# Patient Record
Sex: FEMALE
[De-identification: names, ages, dates, MRNs, and addresses within clinical notes are randomized per-mention and may not be internally consistent; named-entity substitution may affect disease eponyms.]

---

## 2020-04-25 ENCOUNTER — TRANSCRIPTION ENCOUNTER (OUTPATIENT)
Age: 20
End: 2020-04-25

## 2020-04-27 ENCOUNTER — EMERGENCY (EMERGENCY)
Facility: HOSPITAL | Age: 20
LOS: 1 days | Discharge: HOME | End: 2020-04-27
Attending: EMERGENCY MEDICINE
Payer: COMMERCIAL

## 2020-04-27 ENCOUNTER — APPOINTMENT (OUTPATIENT)
Dept: CARDIOLOGY | Facility: CLINIC | Age: 20
End: 2020-04-27

## 2020-04-27 VITALS
RESPIRATION RATE: 18 BRPM | HEART RATE: 98 BPM | SYSTOLIC BLOOD PRESSURE: 138 MMHG | DIASTOLIC BLOOD PRESSURE: 73 MMHG | TEMPERATURE: 99 F | OXYGEN SATURATION: 100 %

## 2020-04-27 VITALS
DIASTOLIC BLOOD PRESSURE: 86 MMHG | WEIGHT: 210.1 LBS | HEIGHT: 62 IN | SYSTOLIC BLOOD PRESSURE: 137 MMHG | TEMPERATURE: 99 F | RESPIRATION RATE: 19 BRPM | HEART RATE: 115 BPM | OXYGEN SATURATION: 97 %

## 2020-04-27 LAB
ALBUMIN SERPL ELPH-MCNC: 3.3 G/DL — SIGNIFICANT CHANGE UP (ref 3.3–5)
ALP SERPL-CCNC: 65 U/L — SIGNIFICANT CHANGE UP (ref 40–120)
ALT FLD-CCNC: 23 U/L — SIGNIFICANT CHANGE UP (ref 10–45)
ANION GAP SERPL CALC-SCNC: 11 MMOL/L — SIGNIFICANT CHANGE UP (ref 5–17)
AST SERPL-CCNC: 31 U/L — SIGNIFICANT CHANGE UP (ref 10–40)
BASOPHILS # BLD AUTO: 0.02 K/UL — SIGNIFICANT CHANGE UP (ref 0–0.2)
BASOPHILS NFR BLD AUTO: 0.3 % — SIGNIFICANT CHANGE UP (ref 0–2)
BILIRUB SERPL-MCNC: 0.4 MG/DL — SIGNIFICANT CHANGE UP (ref 0.2–1.2)
BLD GP AB SCN SERPL QL: NEGATIVE — SIGNIFICANT CHANGE UP
BUN SERPL-MCNC: 10 MG/DL — SIGNIFICANT CHANGE UP (ref 7–23)
CALCIUM SERPL-MCNC: 9.1 MG/DL — SIGNIFICANT CHANGE UP (ref 8.4–10.5)
CHLORIDE SERPL-SCNC: 100 MMOL/L — SIGNIFICANT CHANGE UP (ref 96–108)
CO2 SERPL-SCNC: 23 MMOL/L — SIGNIFICANT CHANGE UP (ref 22–31)
CREAT SERPL-MCNC: 0.66 MG/DL — SIGNIFICANT CHANGE UP (ref 0.5–1.3)
EOSINOPHIL # BLD AUTO: 0.02 K/UL — SIGNIFICANT CHANGE UP (ref 0–0.5)
EOSINOPHIL NFR BLD AUTO: 0.3 % — SIGNIFICANT CHANGE UP (ref 0–6)
GLUCOSE SERPL-MCNC: 120 MG/DL — HIGH (ref 70–99)
HCT VFR BLD CALC: 32.5 % — LOW (ref 34.5–45)
HGB BLD-MCNC: 10.6 G/DL — LOW (ref 11.5–15.5)
IMM GRANULOCYTES NFR BLD AUTO: 0.3 % — SIGNIFICANT CHANGE UP (ref 0–1.5)
LYMPHOCYTES # BLD AUTO: 2.55 K/UL — SIGNIFICANT CHANGE UP (ref 1–3.3)
LYMPHOCYTES # BLD AUTO: 37 % — SIGNIFICANT CHANGE UP (ref 13–44)
MCHC RBC-ENTMCNC: 27 PG — SIGNIFICANT CHANGE UP (ref 27–34)
MCHC RBC-ENTMCNC: 32.6 GM/DL — SIGNIFICANT CHANGE UP (ref 32–36)
MCV RBC AUTO: 82.7 FL — SIGNIFICANT CHANGE UP (ref 80–100)
MONOCYTES # BLD AUTO: 0.66 K/UL — SIGNIFICANT CHANGE UP (ref 0–0.9)
MONOCYTES NFR BLD AUTO: 9.6 % — SIGNIFICANT CHANGE UP (ref 2–14)
NEUTROPHILS # BLD AUTO: 3.62 K/UL — SIGNIFICANT CHANGE UP (ref 1.8–7.4)
NEUTROPHILS NFR BLD AUTO: 52.5 % — SIGNIFICANT CHANGE UP (ref 43–77)
NRBC # BLD: 0 /100 WBCS — SIGNIFICANT CHANGE UP (ref 0–0)
PLATELET # BLD AUTO: 181 K/UL — SIGNIFICANT CHANGE UP (ref 150–400)
POTASSIUM SERPL-MCNC: 3.6 MMOL/L — SIGNIFICANT CHANGE UP (ref 3.5–5.3)
POTASSIUM SERPL-SCNC: 3.6 MMOL/L — SIGNIFICANT CHANGE UP (ref 3.5–5.3)
PROT SERPL-MCNC: 11.4 G/DL — HIGH (ref 6–8.3)
RBC # BLD: 3.93 M/UL — SIGNIFICANT CHANGE UP (ref 3.8–5.2)
RBC # FLD: 13.2 % — SIGNIFICANT CHANGE UP (ref 10.3–14.5)
RH IG SCN BLD-IMP: POSITIVE — SIGNIFICANT CHANGE UP
RH IG SCN BLD-IMP: POSITIVE — SIGNIFICANT CHANGE UP
SARS-COV-2 RNA SPEC QL NAA+PROBE: SIGNIFICANT CHANGE UP
SODIUM SERPL-SCNC: 140 MMOL/L — SIGNIFICANT CHANGE UP (ref 135–145)
WBC # BLD: 6.89 K/UL — SIGNIFICANT CHANGE UP (ref 3.8–10.5)
WBC # FLD AUTO: 6.89 K/UL — SIGNIFICANT CHANGE UP (ref 3.8–10.5)

## 2020-04-27 PROCEDURE — 99284 EMERGENCY DEPT VISIT MOD MDM: CPT

## 2020-04-27 PROCEDURE — 85027 COMPLETE CBC AUTOMATED: CPT

## 2020-04-27 PROCEDURE — 80053 COMPREHEN METABOLIC PANEL: CPT

## 2020-04-27 PROCEDURE — 99283 EMERGENCY DEPT VISIT LOW MDM: CPT

## 2020-04-27 PROCEDURE — 86901 BLOOD TYPING SEROLOGIC RH(D): CPT

## 2020-04-27 PROCEDURE — 86850 RBC ANTIBODY SCREEN: CPT

## 2020-04-27 PROCEDURE — 86900 BLOOD TYPING SEROLOGIC ABO: CPT

## 2020-04-27 NOTE — ED ADULT TRIAGE NOTE - CHIEF COMPLAINT QUOTE
low platelets with bleeding gums, headache - pt was in [facility]- discharged yesterday  tested neg for covid at [facility]

## 2020-04-27 NOTE — ED PROVIDER NOTE - CARE PROVIDER_API CALL
Azam Pollack)  Cardiology; Internal Medicine  [address], [address]  [place] [address]  Phone: [phone]  Fax: [phone]  Follow Up Time: Routine

## 2020-04-27 NOTE — ED CLERICAL - NS ED CLERK NOTE PRE-ARRIVAL INFORMATION; ADDITIONAL PRE-ARRIVAL INFORMATION
CC/Reason For referral: Needs a ITP   Preferred Consultant(if applicable):  Who admits for you (if needed):  Do you have documents you would like to fax over?  Would you still like to speak to an ED attending? Yes

## 2020-04-27 NOTE — ED PROVIDER NOTE - PATIENT PORTAL LINK FT
You can access the FollowMyHealth Patient Portal offered by [facility] by registering at the following website: [email or web address]. By joining [facility]’s FollowMyHealth portal, you will also be able to view your health information using other applications (apps) compatible with our system.

## 2020-04-27 NOTE — ED PROVIDER NOTE - NSFOLLOWUPINSTRUCTIONS_ED_ALL_ED_FT
Please return to the ER if you begin experiencing abnormal bleeding. Call Dr. Pollack's office this week to schedule an appointment. Your COVID-19 results will be texted to the cell number you provided.     Rest, drink plenty of fluids.  Advance activity as tolerated.  Continue all previously prescribed medications as directed.  Follow up with your primary care physician in 48-72 hours- bring copies of your results.  Return to the ER for worsening or persistent symptoms, and/or ANY NEW OR CONCERNING SYMPTOMS. If you have issues obtaining follow up, please call: [phone] to obtain a doctor or specialist who takes your insurance in your area.

## 2020-04-27 NOTE — ED PROVIDER NOTE - NS ED ROS FT
GENERAL: no fever, no chills  EYES: no change in vision  HEENT: no trouble swallowing or speaking, +bleeding gums  CARDIAC: no chest pain, no palpitations   PULMONARY: no cough, no shortness of breath, no wheezing  GI: no abdominal pain, no nausea, no vomiting, no diarrhea, no constipation, no melena  : no changes in urination  SKIN: no rashes  NEURO: no headache, no numbness, no weakness  MSK: no joint pain, no muscle pain, no back pain, no calf pain     -Vargas Kirkland, PGY-1

## 2020-04-27 NOTE — ED PROVIDER NOTE - ATTENDING CONTRIBUTION TO CARE
Attending Statement (JONO Horne MD):    HPI: 21y/o F with recent admission to OSH for pneumonia and ITP; discharged yesterday, had previously presented for bleeding gums; now s/p platelet transfusion; presenting now for bleeding from gums last night/this morning (only a small amount; much less than previously).  called a doctor (Dr Pollack) to establish care and was directed to come to ED here for repeat evaluation.  No fever, no n/v, no cp/sob.     Review of Systems:  -General: no fever or chills  -ENT: no congestion, no difficulty swallowing; bleeding gums.  -Pulmonary: no cough, no shortness of breath  -Cardiac: no chest pain, no palpitations  -Gastrointestinal: no abdominal pain, no nausea, no vomiting, and no diarrhea.  -Genitourinary: no blood or pain with urination  -Musculoskeletal: no back or neck pain  -Skin: no rashes  -Endocrine: No h/o diabetes or thyroid disease  -Neurologic: No focal weakness or numbness    All else negative unless otherwise specified elsewhere in this note.    PSH/PMH as noted above    On Physical Exam:  General: well appearing, in NAD, speaking clearly in full sentences and without difficulty; cooperative with exam  HEENT: PERRL, MMM, airway patent; gums appear normal, no active bleedign, no blood / abrasions / lacerations in oropharynx.  Neck: no neck tenderness, no nuchal rigidity  Cardiac: normal s1, s2; RRR; no MGR  Lungs: CTABL  Abdomen: soft nontender/nondistended  : no bladder tenderness or distension  Skin: intact, no rash  Extremities: no peripheral edema, no gross deformities  Neuro: no gross neurologic deficits    MDM: 21y/o F with recent admission to OSH for pneumonia and ITP; discharged yesterday, had previously presented for bleeding gums; now s/p platelet transfusion; presenting now for bleeding from gums.  will repeat labs: cbc, cmp and reassess; no active bleeding at present.

## 2020-04-27 NOTE — ED PROVIDER NOTE - PROGRESS NOTE DETAILS
DH: Plt today 181. D/w Dr. Pollack - will see patient in office. COVID-19 sent. Discussed return precautions and all questions answered. Pt in agreement w/ plan. CAOx3, NAD, VSS. Stable for d/c.

## 2020-04-27 NOTE — ED PROVIDER NOTE - OBJECTIVE STATEMENT
20 year old female no PMH p/w bleeding gums. Patient recently admitted to OSH 4/24-4/26 and received platelet transfusion for ITP. Patient was discharged home and told to Follow up w/ Dr. Azam Pollack. Patient returning to ED for additional episode of bleeding gums today. No recent viral illness, no new medications. Patient w/o known hx of bleeding disorders. Patient denies bleeding from other sites, LMP few weeks ago and was normal. Endorses occasional headaches over past few days relieved w/ Tylenol. Denies fever, chills, visual changes, CP, sob, abd pain, N/V, or weakness.

## 2020-04-27 NOTE — ED ADULT NURSE NOTE - OBJECTIVE STATEMENT
Patient was seen at [facility] over the weekend for bleeding of the gums.  Patient was transfused with platelets and given a diagnosis of ITP.  Patient presents today complaining of bleeding of her gums this morning, it has since resolved.  Patient is talking in full sentences and oriented x4. Brusing is noted to her back and arms .  No active bleeding is noted

## 2020-04-27 NOTE — ED PROVIDER NOTE - CLINICAL SUMMARY MEDICAL DECISION MAKING FREE TEXT BOX
Vargas Kirkland, PGY1: 20 year old female no PMH p/w bleeding gums x1 episode today. Recent admission to OSH for same, given platelets after dx ITP. Patient has not followed up w/ Dr. Pollack yet. Tachycardic in triage but will revital. Patient appears stable otherwise, non-focal exam. No active oral hemorrhages or petechiae. Plan for labs, T&S, d/w hematology for follow up.

## 2020-04-27 NOTE — ED PROVIDER NOTE - PHYSICAL EXAMINATION
GENERAL: A&Ox3, non-toxic appearing, no acute distress  HEENT: NCAT, EOMI, oral mucosa moist, normal conjunctiva, no active oral hemorrhages  RESP: CTAB, no respiratory distress, no wheezes/rhonchi/rales, speaking in full sentences  CV: RRR, no murmurs/rubs/gallops  ABDOMEN: soft, non-tender, non-distended, no guarding  MSK: no visible deformities  NEURO: no focal sensory or motor deficits, normal CN exam   SKIN: warm, normal color, well perfused, no rash, no petechiae  PSYCH: normal affect    -Vargas Kirkland, PGY-1

## 2020-05-04 ENCOUNTER — NON-APPOINTMENT (OUTPATIENT)
Age: 20
End: 2020-05-04

## 2020-05-04 ENCOUNTER — APPOINTMENT (OUTPATIENT)
Dept: CARDIOLOGY | Facility: CLINIC | Age: 20
End: 2020-05-04
Payer: COMMERCIAL

## 2020-05-04 ENCOUNTER — APPOINTMENT (OUTPATIENT)
Dept: PULMONOLOGY | Facility: CLINIC | Age: 20
End: 2020-05-04
Payer: COMMERCIAL

## 2020-05-04 VITALS
BODY MASS INDEX: 40.59 KG/M2 | OXYGEN SATURATION: 98 % | SYSTOLIC BLOOD PRESSURE: 130 MMHG | TEMPERATURE: 98.4 F | DIASTOLIC BLOOD PRESSURE: 80 MMHG | HEIGHT: 61 IN | WEIGHT: 215 LBS | HEART RATE: 80 BPM

## 2020-05-04 DIAGNOSIS — Z11.4 ENCOUNTER FOR SCREENING FOR HUMAN IMMUNODEFICIENCY VIRUS [HIV]: ICD-10-CM

## 2020-05-04 DIAGNOSIS — K06.8 OTHER SPECIFIED DISORDERS OF GINGIVA AND EDENTULOUS ALVEOLAR RIDGE: ICD-10-CM

## 2020-05-04 DIAGNOSIS — Z00.00 ENCOUNTER FOR GENERAL ADULT MEDICAL EXAMINATION W/OUT ABNORMAL FINDINGS: ICD-10-CM

## 2020-05-04 DIAGNOSIS — R77.9 ABNORMALITY OF PLASMA PROTEIN, UNSPECIFIED: ICD-10-CM

## 2020-05-04 PROCEDURE — 71046 X-RAY EXAM CHEST 2 VIEWS: CPT

## 2020-05-04 PROCEDURE — 99385 PREV VISIT NEW AGE 18-39: CPT

## 2020-05-04 PROCEDURE — 93000 ELECTROCARDIOGRAM COMPLETE: CPT

## 2020-05-04 NOTE — PHYSICAL EXAM
[General Appearance - Well Developed] : well developed [Well Groomed] : well groomed [General Appearance - Well Nourished] : well nourished [Normal Appearance] : normal appearance [No Deformities] : no deformities [General Appearance - In No Acute Distress] : no acute distress [Normal Conjunctiva] : the conjunctiva exhibited no abnormalities [Normal Oral Mucosa] : normal oral mucosa [Eyelids - No Xanthelasma] : the eyelids demonstrated no xanthelasmas [Normal Jugular Venous A Waves Present] : normal jugular venous A waves present [No Oral Cyanosis] : no oral cyanosis [No Oral Pallor] : no oral pallor [Normal Jugular Venous V Waves Present] : normal jugular venous V waves present [No Jugular Venous Mullins A Waves] : no jugular venous mullins A waves [Heart Rate And Rhythm] : heart rate and rhythm were normal [Heart Sounds] : normal S1 and S2 [Murmurs] : no murmurs present [Respiration, Rhythm And Depth] : normal respiratory rhythm and effort [Exaggerated Use Of Accessory Muscles For Inspiration] : no accessory muscle use [Auscultation Breath Sounds / Voice Sounds] : lungs were clear to auscultation bilaterally [Abdomen Soft] : soft [Abdomen Tenderness] : non-tender [Abdomen Mass (___ Cm)] : no abdominal mass palpated [Nail Clubbing] : no clubbing of the fingernails [Abnormal Walk] : normal gait [Gait - Sufficient For Exercise Testing] : the gait was sufficient for exercise testing [Cyanosis, Localized] : no localized cyanosis [Petechial Hemorrhages (___cm)] : no petechial hemorrhages [] : no rash [No Venous Stasis] : no venous stasis [Skin Color & Pigmentation] : normal skin color and pigmentation [No Skin Ulcers] : no skin ulcer [Skin Lesions] : no skin lesions [No Xanthoma] : no  xanthoma was observed [Affect] : the affect was normal [Oriented To Time, Place, And Person] : oriented to person, place, and time [Mood] : the mood was normal [No Anxiety] : not feeling anxious

## 2020-05-04 NOTE — HISTORY OF PRESENT ILLNESS
[FreeTextEntry1] : Nicole is a 21yo female with PMH of asthma who presents today to establish care and for hospital follow-up. Patient reports that on 4/24 she developed bleeding gums and petechial rash on her body. She was advised to go to the ED by urgent care for higher level of care. Patient was admitted to [facility] from 4/24-4/26 and received platelet transfusion for ITP. Patient was discharged home and told to return to the ED if she developed bleeding gums again.The patient returned to the to ED for additional episode of bleeding gums this past weekend. Lab work was done and was found to be stable and the bleeding resolved. Patient denies chest pain, dizziness, or palpitations. pt also with questionable pneumonia on initial presentation

## 2020-05-09 LAB
25(OH)D3 SERPL-MCNC: 16.2 NG/ML
ALBUMIN MFR SERPL ELPH: 38.2 %
ALBUMIN SERPL ELPH-MCNC: 3.9 G/DL
ALBUMIN SERPL-MCNC: 3.7 G/DL
ALBUMIN/GLOB SERPL: 0.6 RATIO
ALP BLD-CCNC: 88 U/L
ALPHA1 GLOB MFR SERPL ELPH: 3.6 %
ALPHA1 GLOB SERPL ELPH-MCNC: 0.3 G/DL
ALPHA2 GLOB MFR SERPL ELPH: 9.1 %
ALPHA2 GLOB SERPL ELPH-MCNC: 0.9 G/DL
ALT SERPL-CCNC: 282 U/L
ANA SER IF-ACNC: NEGATIVE
ANION GAP SERPL CALC-SCNC: 10 MMOL/L
APPEARANCE: CLEAR
AST SERPL-CCNC: 243 U/L
B-GLOBULIN MFR SERPL ELPH: 8.7 %
B-GLOBULIN SERPL ELPH-MCNC: 0.8 G/DL
BACTERIA: NEGATIVE
BASOPHILS # BLD AUTO: 0.04 K/UL
BASOPHILS NFR BLD AUTO: 0.8 %
BILIRUB SERPL-MCNC: 0.3 MG/DL
BILIRUBIN URINE: NEGATIVE
BLOOD URINE: ABNORMAL
BUN SERPL-MCNC: 14 MG/DL
CALCIUM SERPL-MCNC: 9.6 MG/DL
CHLORIDE SERPL-SCNC: 101 MMOL/L
CHOLEST SERPL-MCNC: 165 MG/DL
CHOLEST/HDLC SERPL: 2.1 RATIO
CO2 SERPL-SCNC: 24 MMOL/L
COLOR: NORMAL
CREAT SERPL-MCNC: 0.65 MG/DL
CRP SERPL-MCNC: 0.16 MG/DL
DSDNA AB SER-ACNC: 14 IU/ML
EOSINOPHIL # BLD AUTO: 0.05 K/UL
EOSINOPHIL NFR BLD AUTO: 1 %
ERYTHROCYTE [SEDIMENTATION RATE] IN BLOOD BY WESTERGREN METHOD: 120 MM/HR
ESTIMATED AVERAGE GLUCOSE: 105 MG/DL
FERRITIN SERPL-MCNC: 176 NG/ML
FOLATE SERPL-MCNC: >20 NG/ML
GAMMA GLOB FLD ELPH-MCNC: 3.9 G/DL
GAMMA GLOB MFR SERPL ELPH: 40.4 %
GLUCOSE QUALITATIVE U: NEGATIVE
GLUCOSE SERPL-MCNC: 87 MG/DL
HAPTOGLOB SERPL-MCNC: 150 MG/DL
HBA1C MFR BLD HPLC: 5.3 %
HCT VFR BLD CALC: 35.7 %
HDLC SERPL-MCNC: 80 MG/DL
HGB A MFR BLD: 97.5 %
HGB A2 MFR BLD: 2.5 %
HGB BLD-MCNC: 11.2 G/DL
HGB FRACT BLD-IMP: NORMAL
HIV1+2 AB SPEC QL IA.RAPID: NONREACTIVE
HYALINE CASTS: 3 /LPF
IMM GRANULOCYTES NFR BLD AUTO: 0.2 %
INTERPRETATION SERPL IEP-IMP: NORMAL
IRON SATN MFR SERPL: 9 %
IRON SERPL-MCNC: 27 UG/DL
KETONES URINE: NEGATIVE
LDH SERPL-CCNC: 324 U/L
LDLC SERPL CALC-MCNC: 78 MG/DL
LEUKOCYTE ESTERASE URINE: ABNORMAL
LYMPHOCYTES # BLD AUTO: 2.54 K/UL
LYMPHOCYTES NFR BLD AUTO: 48.3 %
M PROTEIN SPEC IFE-MCNC: NORMAL
MAN DIFF?: NORMAL
MCHC RBC-ENTMCNC: 27 PG
MCHC RBC-ENTMCNC: 31.4 GM/DL
MCV RBC AUTO: 86 FL
MICROSCOPIC-UA: NORMAL
MONOCYTES # BLD AUTO: 0.46 K/UL
MONOCYTES NFR BLD AUTO: 8.7 %
NEUTROPHILS # BLD AUTO: 2.16 K/UL
NEUTROPHILS NFR BLD AUTO: 41 %
NITRITE URINE: NEGATIVE
PH URINE: 6
PLATELET # BLD AUTO: 479 K/UL
POTASSIUM SERPL-SCNC: 4.3 MMOL/L
PROT SERPL-MCNC: 9.7 G/DL
PROT SERPL-MCNC: 9.7 G/DL
PROT SERPL-MCNC: 9.8 G/DL
PROTEIN URINE: NEGATIVE
RBC # BLD: 4.15 M/UL
RBC # FLD: 13.7 %
RED BLOOD CELLS URINE: 3 /HPF
SODIUM SERPL-SCNC: 135 MMOL/L
SPECIFIC GRAVITY URINE: 1.02
SQUAMOUS EPITHELIAL CELLS: 5 /HPF
T4 FREE SERPL-MCNC: 1.2 NG/DL
TIBC SERPL-MCNC: 288 UG/DL
TRANSFERRIN SERPL-MCNC: 249 MG/DL
TRIGL SERPL-MCNC: 36 MG/DL
TSH SERPL-ACNC: 1.41 UIU/ML
UIBC SERPL-MCNC: 261 UG/DL
UROBILINOGEN URINE: NORMAL
VIT B12 SERPL-MCNC: 509 PG/ML
WBC # FLD AUTO: 5.26 K/UL
WHITE BLOOD CELLS URINE: 11 /HPF

## 2020-05-18 ENCOUNTER — APPOINTMENT (OUTPATIENT)
Dept: CARDIOLOGY | Facility: CLINIC | Age: 20
End: 2020-05-18
Payer: COMMERCIAL

## 2020-05-18 ENCOUNTER — APPOINTMENT (OUTPATIENT)
Dept: RADIOLOGY | Facility: IMAGING CENTER | Age: 20
End: 2020-05-18
Payer: COMMERCIAL

## 2020-05-18 ENCOUNTER — OUTPATIENT (OUTPATIENT)
Dept: OUTPATIENT SERVICES | Facility: HOSPITAL | Age: 20
LOS: 1 days | End: 2020-05-18
Payer: COMMERCIAL

## 2020-05-18 ENCOUNTER — LABORATORY RESULT (OUTPATIENT)
Age: 20
End: 2020-05-18

## 2020-05-18 ENCOUNTER — OUTPATIENT (OUTPATIENT)
Dept: OUTPATIENT SERVICES | Facility: HOSPITAL | Age: 20
LOS: 1 days | Discharge: HOME | End: 2020-05-18

## 2020-05-18 ENCOUNTER — RESULT REVIEW (OUTPATIENT)
Age: 20
End: 2020-05-18

## 2020-05-18 ENCOUNTER — RECORD ABSTRACTING (OUTPATIENT)
Age: 20
End: 2020-05-18

## 2020-05-18 VITALS
SYSTOLIC BLOOD PRESSURE: 128 MMHG | HEIGHT: 61 IN | TEMPERATURE: 98.3 F | WEIGHT: 216 LBS | DIASTOLIC BLOOD PRESSURE: 88 MMHG | HEART RATE: 77 BPM | OXYGEN SATURATION: 99 % | BODY MASS INDEX: 40.78 KG/M2

## 2020-05-18 DIAGNOSIS — R89.9 UNSPECIFIED ABNORMAL FINDING IN SPECIMENS FROM OTHER ORGANS, SYSTEMS AND TISSUES: ICD-10-CM

## 2020-05-18 DIAGNOSIS — R70.0 ELEVATED ERYTHROCYTE SEDIMENTATION RATE: ICD-10-CM

## 2020-05-18 DIAGNOSIS — D64.9 ANEMIA, UNSPECIFIED: ICD-10-CM

## 2020-05-18 DIAGNOSIS — J18.9 PNEUMONIA, UNSPECIFIED ORGANISM: ICD-10-CM

## 2020-05-18 DIAGNOSIS — Z71.89 OTHER SPECIFIED COUNSELING: ICD-10-CM

## 2020-05-18 PROCEDURE — 99214 OFFICE O/P EST MOD 30 MIN: CPT

## 2020-05-18 PROCEDURE — 71046 X-RAY EXAM CHEST 2 VIEWS: CPT | Mod: 26

## 2020-05-18 PROCEDURE — 71046 X-RAY EXAM CHEST 2 VIEWS: CPT

## 2020-05-18 RX ORDER — CEFDINIR 300 MG/1
300 CAPSULE ORAL
Qty: 14 | Refills: 0 | Status: DISCONTINUED | COMMUNITY
Start: 2020-05-04 | End: 2020-05-18

## 2020-05-18 RX ORDER — AZITHROMYCIN 250 MG/1
250 TABLET, FILM COATED ORAL
Qty: 6 | Refills: 1 | Status: DISCONTINUED | COMMUNITY
Start: 2020-05-04 | End: 2020-05-18

## 2020-05-18 NOTE — HISTORY OF PRESENT ILLNESS
[FreeTextEntry1] : Nicole is a 19yo female who presents today for follow-up. Patient previously seen in office on 5/4 and was found to have PNA and abnormal lab work. patient was started on Cefdinir and Z-pack for PNA found on CXR and Ferrex for ANGELICA. Patient reports she is feeling well, she has no issues or complaints for today except punctate discoloration right lower lip

## 2020-05-18 NOTE — PHYSICAL EXAM
[General Appearance - Well Developed] : well developed [Normal Appearance] : normal appearance [Well Groomed] : well groomed [General Appearance - Well Nourished] : well nourished [No Deformities] : no deformities [General Appearance - In No Acute Distress] : no acute distress [Normal Conjunctiva] : the conjunctiva exhibited no abnormalities [Eyelids - No Xanthelasma] : the eyelids demonstrated no xanthelasmas [FreeTextEntry1] : punctate discoloration right lower lip  [Normal Jugular Venous A Waves Present] : normal jugular venous A waves present [Normal Jugular Venous V Waves Present] : normal jugular venous V waves present [No Jugular Venous Mullins A Waves] : no jugular venous mullins A waves [Respiration, Rhythm And Depth] : normal respiratory rhythm and effort [Exaggerated Use Of Accessory Muscles For Inspiration] : no accessory muscle use [Heart Rate And Rhythm] : heart rate and rhythm were normal [Auscultation Breath Sounds / Voice Sounds] : lungs were clear to auscultation bilaterally [Heart Sounds] : normal S1 and S2 [Murmurs] : no murmurs present [Abdomen Soft] : soft [Abdomen Tenderness] : non-tender [Abdomen Mass (___ Cm)] : no abdominal mass palpated [Abnormal Walk] : normal gait [Gait - Sufficient For Exercise Testing] : the gait was sufficient for exercise testing [Cyanosis, Localized] : no localized cyanosis [Nail Clubbing] : no clubbing of the fingernails [Petechial Hemorrhages (___cm)] : no petechial hemorrhages [Skin Color & Pigmentation] : normal skin color and pigmentation [] : no rash [No Venous Stasis] : no venous stasis [No Skin Ulcers] : no skin ulcer [Skin Lesions] : no skin lesions [No Xanthoma] : no  xanthoma was observed [Oriented To Time, Place, And Person] : oriented to person, place, and time [Affect] : the affect was normal [Mood] : the mood was normal [No Anxiety] : not feeling anxious

## 2020-05-19 ENCOUNTER — RESULT REVIEW (OUTPATIENT)
Age: 20
End: 2020-05-19

## 2020-05-19 ENCOUNTER — APPOINTMENT (OUTPATIENT)
Dept: INFUSION THERAPY | Facility: HOSPITAL | Age: 20
End: 2020-05-19

## 2020-05-19 ENCOUNTER — APPOINTMENT (OUTPATIENT)
Dept: HEMATOLOGY ONCOLOGY | Facility: CLINIC | Age: 20
End: 2020-05-19

## 2020-05-19 ENCOUNTER — APPOINTMENT (OUTPATIENT)
Dept: PULMONOLOGY | Facility: CLINIC | Age: 20
End: 2020-05-19
Payer: COMMERCIAL

## 2020-05-19 ENCOUNTER — LABORATORY RESULT (OUTPATIENT)
Age: 20
End: 2020-05-19

## 2020-05-19 ENCOUNTER — APPOINTMENT (OUTPATIENT)
Dept: HEMATOLOGY ONCOLOGY | Facility: CLINIC | Age: 20
End: 2020-05-19
Payer: COMMERCIAL

## 2020-05-19 VITALS
HEART RATE: 87 BPM | HEIGHT: 62.5 IN | BODY MASS INDEX: 38.93 KG/M2 | WEIGHT: 217 LBS | OXYGEN SATURATION: 99 % | SYSTOLIC BLOOD PRESSURE: 120 MMHG | DIASTOLIC BLOOD PRESSURE: 90 MMHG

## 2020-05-19 VITALS
DIASTOLIC BLOOD PRESSURE: 79 MMHG | HEART RATE: 83 BPM | BODY MASS INDEX: 38.39 KG/M2 | TEMPERATURE: 98.9 F | SYSTOLIC BLOOD PRESSURE: 120 MMHG | OXYGEN SATURATION: 100 % | HEIGHT: 63.19 IN | RESPIRATION RATE: 17 BRPM | WEIGHT: 219.36 LBS

## 2020-05-19 DIAGNOSIS — R06.00 DYSPNEA, UNSPECIFIED: ICD-10-CM

## 2020-05-19 DIAGNOSIS — R93.89 ABNORMAL FINDINGS ON DIAGNOSTIC IMAGING OF OTHER SPECIFIED BODY STRUCTURES: ICD-10-CM

## 2020-05-19 DIAGNOSIS — E66.9 OBESITY, UNSPECIFIED: ICD-10-CM

## 2020-05-19 DIAGNOSIS — K21.9 GASTRO-ESOPHAGEAL REFLUX DISEASE W/OUT ESOPHAGITIS: ICD-10-CM

## 2020-05-19 LAB
BASOPHILS # BLD AUTO: 0.03 K/UL — SIGNIFICANT CHANGE UP (ref 0–0.2)
BASOPHILS NFR BLD AUTO: 0.5 % — SIGNIFICANT CHANGE UP (ref 0–2)
EOSINOPHIL # BLD AUTO: 0.05 K/UL — SIGNIFICANT CHANGE UP (ref 0–0.5)
EOSINOPHIL NFR BLD AUTO: 0.8 % — SIGNIFICANT CHANGE UP (ref 0–6)
HAPTOGLOB SERPL-MCNC: 131 MG/DL — SIGNIFICANT CHANGE UP (ref 34–200)
HCT VFR BLD CALC: 37 % — SIGNIFICANT CHANGE UP (ref 34.5–45)
HGB BLD-MCNC: 12.7 G/DL — SIGNIFICANT CHANGE UP (ref 11.5–15.5)
IMM GRANULOCYTES NFR BLD AUTO: 0.2 % — SIGNIFICANT CHANGE UP (ref 0–1.5)
LDH SERPL L TO P-CCNC: 235 U/L — SIGNIFICANT CHANGE UP (ref 50–242)
LYMPHOCYTES # BLD AUTO: 2.92 K/UL — SIGNIFICANT CHANGE UP (ref 1–3.3)
LYMPHOCYTES # BLD AUTO: 49.6 % — HIGH (ref 13–44)
MCHC RBC-ENTMCNC: 28.3 PG — SIGNIFICANT CHANGE UP (ref 27–34)
MCHC RBC-ENTMCNC: 34.3 GM/DL — SIGNIFICANT CHANGE UP (ref 32–36)
MCV RBC AUTO: 82.4 FL — SIGNIFICANT CHANGE UP (ref 80–100)
MONOCYTES # BLD AUTO: 0.62 K/UL — SIGNIFICANT CHANGE UP (ref 0–0.9)
MONOCYTES NFR BLD AUTO: 10.5 % — SIGNIFICANT CHANGE UP (ref 2–14)
NEUTROPHILS # BLD AUTO: 2.26 K/UL — SIGNIFICANT CHANGE UP (ref 1.8–7.4)
NEUTROPHILS NFR BLD AUTO: 38.4 % — LOW (ref 43–77)
NRBC # BLD: 0 /100 WBCS — SIGNIFICANT CHANGE UP (ref 0–0)
PLATELET # BLD AUTO: 14 K/UL — CRITICAL LOW (ref 150–400)
RBC # BLD: 4.49 M/UL — SIGNIFICANT CHANGE UP (ref 3.8–5.2)
RBC # FLD: 13.7 % — SIGNIFICANT CHANGE UP (ref 10.3–14.5)
WBC # BLD: 5.89 K/UL — SIGNIFICANT CHANGE UP (ref 3.8–10.5)
WBC # FLD AUTO: 5.89 K/UL — SIGNIFICANT CHANGE UP (ref 3.8–10.5)

## 2020-05-19 PROCEDURE — 99204 OFFICE O/P NEW MOD 45 MIN: CPT

## 2020-05-19 PROCEDURE — 99205 OFFICE O/P NEW HI 60 MIN: CPT

## 2020-05-19 NOTE — REVIEW OF SYSTEMS
[Easy Bleeding] : a tendency for easy bleeding [Easy Bruising] : a tendency for easy bruising [Negative] : Allergic/Immunologic [FreeTextEntry4] : mucosal bleeding [de-identified] : petechiae left breast

## 2020-05-19 NOTE — ASSESSMENT
[FreeTextEntry1] : This is a 20 year old female with acute thrombocytopenia.  Initially diagnosed at [facility] in the end of April- no records.  She was treated with cefdinir/zithromax for a RLL pneumonia as well as IVIG/plt transfusion. \par No steroids given per patient. \par \par Her peripheral smear reviewed today, large plt, no clumping.  No schistocytes.  Normal WBC. \par Agree with diagnosis of ITP +/- medication induced though she has been off of meds for a few days now.  \par With her rapid decline of plt and mucosal bleeding, will plan for a platelet transfusion today with a post plt count. \par She will begin a course of pulse decadron 40 mg for 4 days, followed by prednisone 1 mg/kg daily with taper as her plt tolerate.  Risks including but not limited to insomnia, jitteriness, weight gain, hyperglycemia, HTN, etc discussed.  She understands.  Will also begin pepcid with the steroids. \par Plan for IVIG tomorrow- 0.4 gm/kg for 5 days (cannot give 100 gm safely without admitting to the hospital).  \par Obtain records from [facility].  \par Repeat her plt counts tomorrow.  \par Check her PT/PTT, fibrinogen, LDH/haptoglobin today. \par D/w Dr. Lopez.  \par

## 2020-05-19 NOTE — CONSULT LETTER
[Dear  ___] : Dear  [unfilled], [Consult Letter:] : I had the pleasure of evaluating your patient, [unfilled]. [Please see my note below.] : Please see my note below. [Consult Closing:] : Thank you very much for allowing me to participate in the care of this patient.  If you have any questions, please do not hesitate to contact me. [Sincerely,] : Sincerely, [FreeTextEntry3] : Linda Urrutia D.O.\par  of Medicine\par Hematology/Oncology \par [facility]\par [facility]\par [address]\[place] [address]\par Tel: [phone]\par Fax: [phone]\par \par

## 2020-05-19 NOTE — HISTORY OF PRESENT ILLNESS
[de-identified] : This is a 20 year old female with a history of eczema/asthma who is here for an evaluation of thrombocytopenia.  She was admitted to [facility] for 3 days at the end of April for ITP.  She noticed petechiae on her chest wall, leg, mouth bleeding, longer menstrual cycle (9 days instead of 4- not more heavy).  She was found to have low plt, unknown level.  She was given a platelet transfusion followed by IVIG.  She was seen by hematology, unknown who.  She was not started on any steroids.  She was discharged, called her PCP, Dr. Pollack the following day for recurrent oral bleeding.  She was seen at [facility], plt at that time was 181,000 on 4/27.  She was seen by Dr. Pollack yesterday, plt were found to be 32, repeated later 26.  During the hospitalization, she was told she had a RLL pneumonia and was treated with cefdinir and azithromycin.  \par Today she complains of petechiae around her left nipple/breast, some oral bleeding, bruising on her RLE.  Otherwise no fever/chills, no cough, no chest pain, no SOB, no abdominal c/o.  She has not yet had her period yet, late but denies being sexually active.  \par

## 2020-05-19 NOTE — HISTORY OF PRESENT ILLNESS
[TextBox_4] : Possible RLL pneumonia (treated with cefdinir and azithromycin) as well as ITP treated in  [facility] with IVIG and platelet transfusion in late April of 2020.\par COVID negative on 4/26/20\par CXR on 5/18/20 without infiltrate - slightly globular heart\par Platelet count on 5/18/20= 29 K/ul - has TeleHealth Scheduled with Dr Ernandez later today\par Obese, snores, works as medical assistant \par H/O asthma - no symptoms in four years - not on any Rx\par Cats at home - not sensitive to them

## 2020-05-19 NOTE — PHYSICAL EXAM
[Obese] : obese [Normal] : grossly intact [de-identified] : slight petechiae around her left breast, no nipple discharge [de-identified] : bruises on her RLE, abdomen.  Eczema on her b/l LE, back

## 2020-05-19 NOTE — ASSESSMENT
[FreeTextEntry1] : If the patient had pneumonia - it has resolved\par Nothing to suggest active asthma\par Obese\par Suspect LISSA\par Suspect GERD with bronchospasm rather than asthma

## 2020-05-19 NOTE — PHYSICAL EXAM
[No Acute Distress] : no acute distress [Enlarged Base of the Tongue] : enlarged base of the tongue [II] : Mallampati Class: II [Normal Appearance] : normal appearance [Neck Circumference: ___] : neck circumference: [unfilled] [No Neck Mass] : no neck mass [Normal Rate/Rhythm] : normal rate/rhythm [Normal S1, S2] : normal s1, s2 [No Murmurs] : no murmurs [No Resp Distress] : no resp distress [Clear to Auscultation Bilaterally] : clear to auscultation bilaterally [No Abnormalities] : no abnormalities [Benign] : benign [Normal Gait] : normal gait [No Clubbing] : no clubbing [No Edema] : no edema [Normal Color/ Pigmentation] : normal color/ pigmentation [No Focal Deficits] : no focal deficits [Oriented x3] : oriented x3 [Normal Affect] : normal affect [Elongated Uvula] : elongated uvula [TextBox_2] : Obese [TextBox_11] : Laterally scalloped tongue

## 2020-05-20 ENCOUNTER — RESULT REVIEW (OUTPATIENT)
Age: 20
End: 2020-05-20

## 2020-05-20 ENCOUNTER — APPOINTMENT (OUTPATIENT)
Dept: HEMATOLOGY ONCOLOGY | Facility: CLINIC | Age: 20
End: 2020-05-20

## 2020-05-20 ENCOUNTER — APPOINTMENT (OUTPATIENT)
Dept: INFUSION THERAPY | Facility: HOSPITAL | Age: 20
End: 2020-05-20

## 2020-05-20 ENCOUNTER — INPATIENT (INPATIENT)
Facility: HOSPITAL | Age: 20
LOS: 1 days | Discharge: HOME | DRG: 813 | End: 2020-05-22
Attending: INTERNAL MEDICINE | Admitting: HOSPITALIST
Payer: COMMERCIAL

## 2020-05-20 VITALS
SYSTOLIC BLOOD PRESSURE: 152 MMHG | WEIGHT: 210.1 LBS | HEART RATE: 123 BPM | RESPIRATION RATE: 18 BRPM | HEIGHT: 63 IN | DIASTOLIC BLOOD PRESSURE: 88 MMHG | TEMPERATURE: 99 F

## 2020-05-20 DIAGNOSIS — R11.2 NAUSEA WITH VOMITING, UNSPECIFIED: ICD-10-CM

## 2020-05-20 DIAGNOSIS — D69.3 IMMUNE THROMBOCYTOPENIC PURPURA: ICD-10-CM

## 2020-05-20 DIAGNOSIS — R74.0 NONSPECIFIC ELEVATION OF LEVELS OF TRANSAMINASE AND LACTIC ACID DEHYDROGENASE [LDH]: ICD-10-CM

## 2020-05-20 DIAGNOSIS — Z51.89 ENCOUNTER FOR OTHER SPECIFIED AFTERCARE: ICD-10-CM

## 2020-05-20 DIAGNOSIS — Z02.9 ENCOUNTER FOR ADMINISTRATIVE EXAMINATIONS, UNSPECIFIED: ICD-10-CM

## 2020-05-20 DIAGNOSIS — Z29.9 ENCOUNTER FOR PROPHYLACTIC MEASURES, UNSPECIFIED: ICD-10-CM

## 2020-05-20 LAB
ALBUMIN SERPL ELPH-MCNC: 4.2 G/DL — SIGNIFICANT CHANGE UP (ref 3.3–5)
ALP SERPL-CCNC: 129 U/L — HIGH (ref 40–120)
ALT FLD-CCNC: 174 U/L — HIGH (ref 10–45)
ANION GAP SERPL CALC-SCNC: 14 MMOL/L — SIGNIFICANT CHANGE UP (ref 5–17)
APTT BLD: 25.2 SEC — LOW (ref 27.5–36.3)
AST SERPL-CCNC: 79 U/L — HIGH (ref 10–40)
BASOPHILS # BLD AUTO: 0.01 K/UL — SIGNIFICANT CHANGE UP (ref 0–0.2)
BASOPHILS # BLD AUTO: 0.03 K/UL — SIGNIFICANT CHANGE UP (ref 0–0.2)
BASOPHILS NFR BLD AUTO: 0.1 % — SIGNIFICANT CHANGE UP (ref 0–2)
BASOPHILS NFR BLD AUTO: 0.6 % — SIGNIFICANT CHANGE UP (ref 0–2)
BILIRUB SERPL-MCNC: 0.1 MG/DL — LOW (ref 0.2–1.2)
BUN SERPL-MCNC: 12 MG/DL — SIGNIFICANT CHANGE UP (ref 7–23)
CALCIUM SERPL-MCNC: 9.9 MG/DL — SIGNIFICANT CHANGE UP (ref 8.4–10.5)
CHLORIDE SERPL-SCNC: 105 MMOL/L — SIGNIFICANT CHANGE UP (ref 96–108)
CO2 SERPL-SCNC: 18 MMOL/L — LOW (ref 22–31)
CREAT SERPL-MCNC: 0.74 MG/DL — SIGNIFICANT CHANGE UP (ref 0.5–1.3)
EOSINOPHIL # BLD AUTO: 0 K/UL — SIGNIFICANT CHANGE UP (ref 0–0.5)
EOSINOPHIL # BLD AUTO: 0.02 K/UL — SIGNIFICANT CHANGE UP (ref 0–0.5)
EOSINOPHIL NFR BLD AUTO: 0 % — SIGNIFICANT CHANGE UP (ref 0–6)
EOSINOPHIL NFR BLD AUTO: 0.4 % — SIGNIFICANT CHANGE UP (ref 0–6)
GLUCOSE SERPL-MCNC: 142 MG/DL — HIGH (ref 70–99)
HCT VFR BLD CALC: 34.5 % — SIGNIFICANT CHANGE UP (ref 34.5–45)
HCT VFR BLD CALC: 37.4 % — SIGNIFICANT CHANGE UP (ref 34.5–45)
HGB BLD-MCNC: 11.9 G/DL — SIGNIFICANT CHANGE UP (ref 11.5–15.5)
HGB BLD-MCNC: 12.2 G/DL — SIGNIFICANT CHANGE UP (ref 11.5–15.5)
IMM GRANULOCYTES NFR BLD AUTO: 0.2 % — SIGNIFICANT CHANGE UP (ref 0–1.5)
IMM GRANULOCYTES NFR BLD AUTO: 0.3 % — SIGNIFICANT CHANGE UP (ref 0–1.5)
INR BLD: 1 RATIO — SIGNIFICANT CHANGE UP (ref 0.88–1.16)
LACTATE BLDV-MCNC: 2.4 MMOL/L — HIGH (ref 0.7–2)
LG PLATELETS BLD QL AUTO: SLIGHT — SIGNIFICANT CHANGE UP
LYMPHOCYTES # BLD AUTO: 1.16 K/UL — SIGNIFICANT CHANGE UP (ref 1–3.3)
LYMPHOCYTES # BLD AUTO: 1.31 K/UL — SIGNIFICANT CHANGE UP (ref 1–3.3)
LYMPHOCYTES # BLD AUTO: 13.5 % — SIGNIFICANT CHANGE UP (ref 13–44)
LYMPHOCYTES # BLD AUTO: 25.9 % — SIGNIFICANT CHANGE UP (ref 13–44)
MANUAL SMEAR VERIFICATION: SIGNIFICANT CHANGE UP
MCHC RBC-ENTMCNC: 27.4 PG — SIGNIFICANT CHANGE UP (ref 27–34)
MCHC RBC-ENTMCNC: 28 PG — SIGNIFICANT CHANGE UP (ref 27–34)
MCHC RBC-ENTMCNC: 32.6 GM/DL — SIGNIFICANT CHANGE UP (ref 32–36)
MCHC RBC-ENTMCNC: 34.5 GM/DL — SIGNIFICANT CHANGE UP (ref 32–36)
MCV RBC AUTO: 81.2 FL — SIGNIFICANT CHANGE UP (ref 80–100)
MCV RBC AUTO: 84 FL — SIGNIFICANT CHANGE UP (ref 80–100)
MONOCYTES # BLD AUTO: 0.06 K/UL — SIGNIFICANT CHANGE UP (ref 0–0.9)
MONOCYTES # BLD AUTO: 0.19 K/UL — SIGNIFICANT CHANGE UP (ref 0–0.9)
MONOCYTES NFR BLD AUTO: 0.7 % — LOW (ref 2–14)
MONOCYTES NFR BLD AUTO: 3.8 % — SIGNIFICANT CHANGE UP (ref 2–14)
NEUTROPHILS # BLD AUTO: 3.5 K/UL — SIGNIFICANT CHANGE UP (ref 1.8–7.4)
NEUTROPHILS # BLD AUTO: 7.36 K/UL — SIGNIFICANT CHANGE UP (ref 1.8–7.4)
NEUTROPHILS NFR BLD AUTO: 69.1 % — SIGNIFICANT CHANGE UP (ref 43–77)
NEUTROPHILS NFR BLD AUTO: 85.4 % — HIGH (ref 43–77)
NRBC # BLD: 0 /100 WBCS — SIGNIFICANT CHANGE UP (ref 0–0)
NRBC # BLD: 0 /100 WBCS — SIGNIFICANT CHANGE UP (ref 0–0)
PLAT MORPH BLD: ABNORMAL
PLATELET # BLD AUTO: 7 K/UL — CRITICAL LOW (ref 150–400)
PLATELET # BLD AUTO: 8 K/UL — CRITICAL LOW (ref 150–400)
POTASSIUM SERPL-MCNC: 4 MMOL/L — SIGNIFICANT CHANGE UP (ref 3.5–5.3)
POTASSIUM SERPL-SCNC: 4 MMOL/L — SIGNIFICANT CHANGE UP (ref 3.5–5.3)
PROT SERPL-MCNC: 9.3 G/DL — HIGH (ref 6–8.3)
PROTHROM AB SERPL-ACNC: 11.5 SEC — SIGNIFICANT CHANGE UP (ref 10–12.9)
RBC # BLD: 4.25 M/UL — SIGNIFICANT CHANGE UP (ref 3.8–5.2)
RBC # BLD: 4.45 M/UL — SIGNIFICANT CHANGE UP (ref 3.8–5.2)
RBC # FLD: 13.6 % — SIGNIFICANT CHANGE UP (ref 10.3–14.5)
RBC # FLD: 13.8 % — SIGNIFICANT CHANGE UP (ref 10.3–14.5)
RBC BLD AUTO: SIGNIFICANT CHANGE UP
SARS-COV-2 RNA SPEC QL NAA+PROBE: SIGNIFICANT CHANGE UP
SODIUM SERPL-SCNC: 137 MMOL/L — SIGNIFICANT CHANGE UP (ref 135–145)
TSH SERPL-MCNC: 0.34 UIU/ML — SIGNIFICANT CHANGE UP (ref 0.27–4.2)
WBC # BLD: 5.06 K/UL — SIGNIFICANT CHANGE UP (ref 3.8–10.5)
WBC # BLD: 8.62 K/UL — SIGNIFICANT CHANGE UP (ref 3.8–10.5)
WBC # FLD AUTO: 5.06 K/UL — SIGNIFICANT CHANGE UP (ref 3.8–10.5)
WBC # FLD AUTO: 8.62 K/UL — SIGNIFICANT CHANGE UP (ref 3.8–10.5)

## 2020-05-20 PROCEDURE — 99285 EMERGENCY DEPT VISIT HI MDM: CPT

## 2020-05-20 PROCEDURE — 71045 X-RAY EXAM CHEST 1 VIEW: CPT | Mod: 26

## 2020-05-20 PROCEDURE — 99222 1ST HOSP IP/OBS MODERATE 55: CPT | Mod: GC

## 2020-05-20 PROCEDURE — 93010 ELECTROCARDIOGRAM REPORT: CPT

## 2020-05-20 PROCEDURE — 99223 1ST HOSP IP/OBS HIGH 75: CPT | Mod: GC

## 2020-05-20 RX ORDER — SODIUM CHLORIDE 9 MG/ML
500 INJECTION, SOLUTION INTRAVENOUS ONCE
Refills: 0 | Status: COMPLETED | OUTPATIENT
Start: 2020-05-20 | End: 2020-05-20

## 2020-05-20 RX ORDER — ACETAMINOPHEN 500 MG
650 TABLET ORAL ONCE
Refills: 0 | Status: COMPLETED | OUTPATIENT
Start: 2020-05-20 | End: 2020-05-20

## 2020-05-20 RX ORDER — FOLIC ACID 0.8 MG
1 TABLET ORAL DAILY
Refills: 0 | Status: DISCONTINUED | OUTPATIENT
Start: 2020-05-20 | End: 2020-05-22

## 2020-05-20 RX ORDER — IMMUNE GLOBULIN (HUMAN) 10 G/100ML
50 INJECTION INTRAVENOUS; SUBCUTANEOUS DAILY
Refills: 0 | Status: DISCONTINUED | OUTPATIENT
Start: 2020-05-20 | End: 2020-05-20

## 2020-05-20 RX ORDER — DIPHENHYDRAMINE HCL 50 MG
25 CAPSULE ORAL DAILY
Refills: 0 | Status: DISCONTINUED | OUTPATIENT
Start: 2020-05-20 | End: 2020-05-22

## 2020-05-20 RX ORDER — ACETAMINOPHEN 500 MG
650 TABLET ORAL DAILY
Refills: 0 | Status: DISCONTINUED | OUTPATIENT
Start: 2020-05-20 | End: 2020-05-22

## 2020-05-20 RX ORDER — FAMOTIDINE 10 MG/ML
20 INJECTION INTRAVENOUS DAILY
Refills: 0 | Status: DISCONTINUED | OUTPATIENT
Start: 2020-05-20 | End: 2020-05-22

## 2020-05-20 RX ORDER — IMMUNE GLOBULIN (HUMAN) 10 G/100ML
75 INJECTION INTRAVENOUS; SUBCUTANEOUS DAILY
Refills: 0 | Status: COMPLETED | OUTPATIENT
Start: 2020-05-20 | End: 2020-05-21

## 2020-05-20 RX ORDER — DEXAMETHASONE 0.5 MG/5ML
40 ELIXIR ORAL DAILY
Refills: 0 | Status: DISCONTINUED | OUTPATIENT
Start: 2020-05-20 | End: 2020-05-20

## 2020-05-20 RX ORDER — DEXAMETHASONE 0.5 MG/5ML
40 ELIXIR ORAL DAILY
Refills: 0 | Status: DISCONTINUED | OUTPATIENT
Start: 2020-05-21 | End: 2020-05-21

## 2020-05-20 RX ADMIN — Medication 25 MILLIGRAM(S): at 20:48

## 2020-05-20 RX ADMIN — FAMOTIDINE 20 MILLIGRAM(S): 10 INJECTION INTRAVENOUS at 20:49

## 2020-05-20 RX ADMIN — Medication 650 MILLIGRAM(S): at 20:48

## 2020-05-20 RX ADMIN — IMMUNE GLOBULIN (HUMAN) 125 GRAM(S): 10 INJECTION INTRAVENOUS; SUBCUTANEOUS at 20:50

## 2020-05-20 RX ADMIN — Medication 650 MILLIGRAM(S): at 15:01

## 2020-05-20 RX ADMIN — SODIUM CHLORIDE 500 MILLILITER(S): 9 INJECTION, SOLUTION INTRAVENOUS at 15:00

## 2020-05-20 NOTE — CONSULT NOTE ADULT - SUBJECTIVE AND OBJECTIVE BOX
HPI:  20F w/ PMH of eczema/asthma who was seen at [facility] yesterday 5/19/20 as an emergency visit for thrombocytopenia.   She was recently admitted to [facility] at the end of April for 3 days for ITP.  She presented with petechiae on her chest all, leg, mucosal mouth bleeding and a longer menstrual cycle.  Had low platelets, but unknown value.  She was treated with IVIG and platelet transfusion.  She was NOT discharged on any steroids.     Patient was supposed to get IVIG at [facility] today however insurance approval was pending and her platelets resulted at 7K today.   She was sent to the [facility] ED for acute management of ITP.       PAST MEDICAL & SURGICAL HISTORY:  No pertinent past medical history  No significant past surgical history      Allergies  No Known Allergies      MEDICATIONS  (STANDING):  acetaminophen   Tablet .. 650 milliGRAM(s) Oral once  lactated ringers Bolus 500 milliLiter(s) IV Bolus once    MEDICATIONS  (PRN):      FAMILY HISTORY:      SOCIAL HISTORY: No EtOH, no tobacco    REVIEW OF SYSTEMS:    CONSTITUTIONAL: No weakness, fevers or chills  EYES/ENT: No visual changes;  No vertigo or throat pain   NECK: No pain or stiffness  RESPIRATORY: No cough, wheezing, hemoptysis; No shortness of breath  CARDIOVASCULAR: No chest pain or palpitations  GASTROINTESTINAL: No abdominal or epigastric pain. No nausea, vomiting, or hematemesis; No diarrhea or constipation. No melena or hematochezia.  GENITOURINARY: No dysuria, frequency or hematuria  NEUROLOGICAL: No numbness or weakness  SKIN: No itching, burning, rashes, or lesions   All other review of systems is negative unless indicated above.      Height (cm): 160.02 (05-20 @ 14:03)  Weight (kg): 95.3 (05-20 @ 14:03)  BMI (kg/m2): 37.2 (05-20 @ 14:03)  BSA (m2): 1.97 (05-20 @ 14:03)      T(F): 99.6 (05-20-20 @ 14:30), Max: 99.6 (05-20-20 @ 14:30)  HR: 112 (05-20-20 @ 14:45)  BP: 146/80 (05-20-20 @ 14:45)  RR: 18 (05-20-20 @ 14:45)  SpO2: 100% (05-20-20 @ 14:45)      GENERAL: NAD, well-developed  HEAD:  Atraumatic, Normocephalic  EYES: EOMI, PERRLA, conjunctiva and sclera clear  NECK: Supple, No JVD  CHEST/LUNG: Clear to auscultation bilaterally; No wheeze  HEART: Regular rate and rhythm; No murmurs, rubs, or gallops  ABDOMEN: Soft, Nontender, Nondistended; Bowel sounds present  EXTREMITIES:  2+ Peripheral Pulses, No clubbing, cyanosis, or edema  NEUROLOGY: non-focal  SKIN: No rashes or lesions                          11.9   5.06  )-----------( 7        ( 20 May 2020 11:47 )             34.5

## 2020-05-20 NOTE — H&P ADULT - ATTENDING COMMENTS
NIGHT HOSPITALIST:  Patient UNKNOWN to me previously, assigned to me at this point via the ER to admit this 19 y/o F--patient followed by her office physicians above--history of asthma, patient apparently with a recent hospitalization at [facility] in late April for new diagnosed ITP following workup for chest petechiae and legs along with gingival bleeding and prolonged menses with treatment with IVIG and platelet transfusion and treatment at the time for presumed pneumonia, with patient referred from the Haematology office following planned IVIG treatment but with persistent gingival bleeding.   Patient had received a platelet transfusion yesterday at [facility].      ROS:  NO HA< no focal weakness.  NO chest pain/pressure.  NO palpitations.  NO abdominal pain, no red blood per rectum or melena.  NO back pain, no tearing back pain.  Petechiae as above.    NO vaginal bleeding.  No haematuria.  NO weight loss or anorexia.  NO joint pain or swelling.  NO SI/HI.    NO tobacco or ethanol or street drug use.    No family history of bleeding diathesis.    Physical exam with a young, nontoxic, obese F.  Afebrile.  HR 97 (earlier 110)    RR 14.  BP  154/86  99% on RA    HEENT< PERRL< EOMI, no active mouth bleeding  Neck supple  NO thyromegaly  Chest clear  Cor s1 s2 regular to tachycardia.  Declined breast exam.  Abdomen soft nontender, normal bowel sounds, no rebound.  Skin see above.  Neurologic AxOx3.  Speech fluent.  cognition intact.  UE/LE 5/5.  NO SI/HI.    WBC 8.6.   hgb 12.2.  Platelets of 8K.    INR 1.0    K+ 4.0.  Random glucose of 142.    Cr 0.7.  AST 79  ALT  174    serum pregnancy test negative.  COVID-19 negative.    Chest radiograph reviewed with no infiltrate or effusion.    Admitted for symptomatic ITP with now s/P IVIG and platelet transfusion.  Steroids per hematology.  No present active evidence of bleeding or infection.   Blood cultures sent.  Would check TSH.   Patient/ mother aware of course and agree with plan/care as above.   Emotional support provided to patient/mother.   Care reviewed with Dr. Acosta.  Care assumed by the DAY HOSPITALIST.    Jaya Clinton MD  [phone] NIGHT HOSPITALIST:  Patient UNKNOWN to me previously, assigned to me at this point via the ER to admit this 19 y/o F--patient followed by her office physicians above--history of asthma, patient apparently with a recent hospitalization at [facility] in late April for new diagnosed ITP following workup for chest petechiae and legs along with gingival bleeding and prolonged menses with treatment with IVIG and platelet transfusion and treatment at the time for presumed pneumonia, with patient referred from the Haematology office following planned IVIG treatment but with persistent gingival bleeding.   Patient had received a platelet transfusion yesterday at [facility].      ROS:  NO HA< no focal weakness.  NO chest pain/pressure.  NO palpitations.  NO abdominal pain, no red blood per rectum or melena.  NO back pain, no tearing back pain.  Petechiae as above.    NO vaginal bleeding.  No haematuria.  NO weight loss or anorexia.  NO joint pain or swelling.  NO SI/HI.    NO tobacco or ethanol or street drug use.    No family history of bleeding diathesis.    Physical exam with a young, nontoxic, obese F.  Afebrile.  HR 97 (earlier 110)    RR 14.  BP  154/86  99% on RA    HEENT< PERRL< EOMI, no active mouth bleeding  Neck supple  NO thyromegaly  Chest clear  Cor s1 s2 regular to tachycardia.  Declined breast exam.  Abdomen soft nontender, normal bowel sounds, no rebound.  Skin see above.  Neurologic AxOx3.  Speech fluent.  cognition intact.  UE/LE 5/5.  NO SI/HI.    WBC 8.6.   hgb 12.2.  Platelets of 8K.    INR 1.0    K+ 4.0.  Random glucose of 142.    Cr 0.7.  AST 79  ALT  174    serum pregnancy test negative.  COVID-19 negative.    Chest radiograph reviewed with no infiltrate or effusion.    Admitted for symptomatic ITP with now s/P IVIG and platelet transfusion.  Steroids per hematology.  No present active evidence of bleeding or infection.   Blood cultures sent.  Would check TSH.   Patient/ mother aware of course and agree with plan/care as above.   Emotional support provided to patient/mother--mother updated and agrees with plan/care as above.   Care reviewed with Dr. Acosta.  Care assumed by the DAY HOSPITALIST.    Jaya Clinton MD  [phone]

## 2020-05-20 NOTE — H&P ADULT - HISTORY OF PRESENT ILLNESS
Pt is a 21 yo F w/ PMHx eczema/asthma presenting with thrombocytopenia. Pt was seen at [facility] yesterday 5/19/20 for a post-hospital visit. She was recently admitted to [facility] 4/24-4/26 for ITP. At that time, she had presented with petechiae on her chest and legs as well as mucosal mouth bleeding and a longer menstrual cycle (9 days instead of usual 4). She was treated with IVIG and platelet transfusion. She was not started on steroids. She was also found to have a RLL PNA that was treated with azithromycin and cefdinir. Day after discharge, pt seen in [facility] ED for bleeding gums; plts were 181K and she was discharged home. Pt was scheduled to receive IVIG at [facility] on 5/20 but it was delayed on account of pending insurance approval so pt was sent to the ED as her plts were 7K.      In ED:  VS: Temp 37.4, , /88, RR 18, SpO2 100% on RA  Labs: Plts 7K, lactate 2.5  Imaging:   Interventions:  cc, tylenol 650 mg Pt is a 21 yo F w/ PMHx eczema/asthma, recent hospitalization for ITP presenting with thrombocytopenia. Pt was seen at [facility] yesterday 5/19/20 for a post-hospital visit. She was recently admitted to [facility] 4/24-4/26 for ITP. At that time, she had presented with petechiae on her chest and legs as well as mucosal mouth bleeding and a longer menstrual cycle (9 days instead of usual 4). She was treated with IVIG and platelet transfusion. She was not started on steroids. She was also found to have a RLL PNA that was treated with azithromycin and cefdinir. Day after discharge, pt seen in [facility] ED for bleeding gums; plts were 181K and she was discharged home. Pt was scheduled to receive IVIG at [facility] on 5/20 but it was delayed on account of pending insurance approval so pt was sent to the ED as her plts were 7K. Pt received 1U plts yesterday and took her first dose of decadron 40 mg + pepcid today.    In ED:  VS: Temp 37.4, , /88, RR 18, SpO2 100% on RA  Labs: Plts 7K, lactate 2.5  Imaging:   Interventions:  cc, tylenol 650 mg Pt is a 21 yo F w/ PMHx eczema/asthma, recent hospitalization for ITP presenting with thrombocytopenia. Pt was seen at [facility] yesterday 5/19/20 for a post-hospital visit. She was recently admitted to [facility] 4/24-4/26 for ITP. At that time, she had presented with petechiae on her chest and legs as well as mucosal mouth bleeding and a longer menstrual cycle (9 days instead of usual 4). She was treated with IVIG and platelet transfusion. She was not started on steroids. She was also found to have a RLL PNA that was treated with azithromycin and cefdinir. Day after discharge, pt seen in [facility] ED for bleeding gums; plts were 181K and she was discharged home. Pt was scheduled to receive IVIG at [facility] on 5/20 but it was delayed on account of pending insurance approval so pt was sent to the ED as her plts were 7K. Pt received 1U plts yesterday and took her first dose of decadron 40 mg + pepcid today. No recent f/c, CP, SOB, mucosal bleeding, abdominal pain, diarrhea, melena, hematochezia, hematuria    In ED:  VS: Temp 37.4, , /88, RR 18, SpO2 100% on RA. EKG sinus tachycardia  Labs: Plts 7K, , ASt 79, , lactate 2.5  Imaging: CXR clear  Interventions:  cc, tylenol 650 mg

## 2020-05-20 NOTE — H&P ADULT - ASSESSMENT
Pt is a 21 yo F w/ PMHx eczema/asthma, recent hospitalization for ITP presenting with thrombocytopenia. Pt is a 21 yo F w/ PMHx eczema/asthma, recent hospitalization for ITP presenting with thrombocytopenia for IVIG.

## 2020-05-20 NOTE — H&P ADULT - NSHPLABSRESULTS_GEN_ALL_CORE
11.9   5.06  )-----------( 7        ( 20 May 2020 11:47 )             34.5           PT/INR - ( 20 May 2020 14:53 )   PT: 11.5 sec;   INR: 1.00 ratio         PTT - ( 20 May 2020 14:53 )  PTT:25.2 sec          RADIOLOGY, EKG & ADDITIONAL TESTS: Reviewed.

## 2020-05-20 NOTE — ED CLERICAL - NS ED CLERK NOTE PRE-ARRIVAL INFORMATION; ADDITIONAL PRE-ARRIVAL INFORMATION
CC/Reason For referral:  ITP  5/20/20 PLATELETS 15 TODAY PLATELETS   Preferred Consultant(if applicable):  HE/ONC  Who admits for you (if needed):  Do you have documents you would like to fax over?  NO  Would you still like to speak to an ED attending?  PLEASE CALL AFTER PATIENT IS SEEN

## 2020-05-20 NOTE — H&P ADULT - NSHPPHYSICALEXAM_GEN_ALL_CORE
PHYSICAL EXAM:  GENERAL: NAD, well-groomed, well-developed  HEAD:  Atraumatic, Normocephalic  EYES: EOMI, PERRLA, conjunctiva and sclera clear  ENMT: No tonsillar erythema, exudates, or enlargement; Moist mucous membranes  NECK: Supple, No JVD, Normal thyroid  HEART: Regular rate and rhythm; No murmurs, rubs, or gallops  RESPIRATORY: CTA B/L, No W/R/R  ABDOMEN: Soft, Nontender, Nondistended; Bowel sounds present  NEUROLOGY: A&Ox3, nonfocal, moving all extremities  EXTREMITIES:  2+ Peripheral Pulses, No clubbing, cyanosis, or edema  SKIN: warm, dry, normal color, no rash or abnormal lesions GENERAL: NAD   HEAD:  Atraumatic, Normocephalic  EYES: EOMI, PERRLA, conjunctiva and sclera clear  ENMT: Moist mucous membranes. scant petechiae under tongue and lip  NECK: Supple   HEART: Regular rate and rhythm; No murmurs   RESPIRATORY: CTA B/L, No W/R/R  ABDOMEN: Soft, Nontender, Nondistended; Bowel sounds present  NEUROLOGY: A&Ox3, nonfocal, moving all extremities  EXTREMITIES:  2+ Peripheral Pulses, No clubbing, cyanosis, or edema  SKIN: diffuse eczema. Petechiae around L nipple, bruise on R shin

## 2020-05-20 NOTE — ED PROVIDER NOTE - ATTENDING CONTRIBUTION TO CARE
20F sent for evaluation of ITP and platelet/IVIG infusion. Patient was recently admitted for PNA at [facility] where she had petechiae, was found to be thrombocytopenic, suspected ITP, was given IVIG and platelet transfusion. Was discharged and began to follow with heme, was found to be persistently thrombocytopenic, received 1U platelets yesterday, Platelets were 7 today, was recommended to be admitted for IVIG, steroids. Has had 1 day of petechiae on chest, lip and back of mouth. Denies any exacerbating/alleviating factors. PE remarkable for tachycardia, temperature 99.6, petechiae on anterior chest, lower lip, and posterior pharynx, otherwise normal, ambulating without difficulty; impression: ITP, r/o sepsis, r/o electrolyte abnormalities; heme/onc aware and already on board, will require admission.

## 2020-05-20 NOTE — CONSULT NOTE ADULT - ASSESSMENT
20F w/ ITP, sent in for management of thrombocytopenia.     # Idiopathic thrombocytopenic purpura (ITP):  - presented to [facility] with platelets 7K  - could not get IVIG at [facility]  - please admit to medicine  - start decadron 40mg daily x 4 days   - give IVIG 1mg/kg daily x 2 doses; will need premedication 30 minutes prior to IVIG with tylenol 650mg PO and benadryl 25mg PO.  - start famotidine 20mg daily for ppx while on steroids  - will need to continue prednisone 100mg daily after completion of decadron  - outpatient follow-up with Dr. Linda Urrutia    Will follow.     Sanjuanita Story MD  Hematology/Oncology Fellow, PGY-5  pager: [phone]  After 5pm or on weekends, please page the on-call fellow. 20F w/ ITP, sent in for management of thrombocytopenia.     # Idiopathic thrombocytopenic purpura (ITP):  - presented to [facility] with platelets 7K  - could not get IVIG at [facility]  - please admit to medicine  - start decadron 40mg daily x 4 days   - give IVIG 1 g/kg daily run over 6 hours x 2 doses; will need premedication 30 minutes prior to IVIG with tylenol 650mg PO and benadryl 25mg PO.  - start famotidine 20mg daily for ppx while on steroids  - will need to continue prednisone 100mg daily after completion of decadron  - outpatient follow-up with Dr. Linda Urrutia    Will follow.     Sanjuanita Story MD  Hematology/Oncology Fellow, PGY-5  pager: [phone]  After 5pm or on weekends, please page the on-call fellow. 20F w/ ITP, sent in for management of thrombocytopenia.     # Idiopathic thrombocytopenic purpura (ITP):  - presented to [facility] with platelets 7K  - could not get IVIG at [facility]  - please admit to medicine  - start decadron 40mg daily x 4 days   - give IVIG 1 gram/kg daily infused over 6 hours x 2 doses.  Will need premedication 30 minutes prior to IVIG with tylenol 650mg PO and benadryl 25mg PO.  - start famotidine 20mg daily for ppx while on steroids  - will need to continue prednisone 100mg daily after completion of decadron  - outpatient follow-up with Dr. Linda Urrutia    Will follow.     Sanjuanita Story MD  Hematology/Oncology Fellow, PGY-5  pager: [phone]  After 5pm or on weekends, please page the on-call fellow. 20F w/ ITP, sent in for management of thrombocytopenia.     # Idiopathic thrombocytopenic purpura (ITP):  - presented to [facility] with platelets 7K  - could not get IVIG at [facility]  - please admit to medicine  - start decadron 40mg daily x 4 days   - give IVIG 1 gram/kg daily infused over 6 hours x 2 doses.  Will need premedication 30 minutes prior to IVIG with tylenol 650mg PO and benadryl 25mg PO.  - start famotidine 20mg daily for ppx while on steroids  - please check iron, TIBC, ferritin  - peripheral smear reviewed: normochromic RBCs, thrombocytopenia, large platelets   - will need to continue prednisone 100mg daily after completion of decadron  - outpatient follow-up with Dr. Linda Urrutia    Will follow.     Sanjuanita Story MD  Hematology/Oncology Fellow, PGY-5  pager: [phone]  After 5pm or on weekends, please page the on-call fellow.

## 2020-05-20 NOTE — ED PROVIDER NOTE - ENMT, MLM
Airway patent, Nasal mucosa clear. Mouth with petechiae in posterior pharynx. Throat has no vesicles, no oropharyngeal exudates and uvula is midline.

## 2020-05-20 NOTE — H&P ADULT - PROBLEM SELECTOR PLAN 4
Transitions of Care Status:  1.  Name of PCP:   Dr. Azam Pollack  [phone]  2.  PCP Contacted on Admission: [x ] Y    [ ] N  by [facility] Email.  3.  PCP contacted at Discharge: [ ] Y    [ ] N    [ ] N/A  4.  Post-Discharge Appointment Date and Location:  5.  Summary of Handoff given to PCP:

## 2020-05-20 NOTE — ED ADULT NURSE NOTE - OBJECTIVE STATEMENT
patient is a 20 year old female with a PMH of ITP (diagnosed april 2020) who presents to the ED from home with abnormal platelet count. as per patient patient has been seen by PCP and hematologist x 1 week for low platelets. patient was transfused yesterday and notified today that platelets were still low and needed to come to ED for further evaluation. patient states she has recently been diagnosed with PNA but states she took abx as ordered by PCP. patient is aaox3, lungs CTA bilaterally, abd soft, nondistended, nontender, cap refill <3, radial pulses +2 bilaterally. patient denies chest pain, shortness of breath, ha, dizziness, weakness, n/v/d, cough, fever, chills, abd pain, back pain, changes in bowel or bladder, hematuria, bloody stools. patient unsure of LMP. patient recently hospitalized in april for ITP and states "medications I was given stopped my menstrual cycle". MD at bedside to assess. patient sinus tachycardia on monitor. patient  comfort and safety provided. IV placed. EKG done. will continue to monitor.

## 2020-05-20 NOTE — H&P ADULT - PROBLEM SELECTOR PLAN 2
SVT ppx: holding 2/2 thrombocytopenia  Diet: regular - will check acute hepatitis panel  - trend CMP

## 2020-05-20 NOTE — ED ADULT NURSE NOTE - ED STAT RN HANDOFF DETAILS
Marva BELLE covering break for Porsha BELLE [id number]. Marva BELLE gave report to YOSHI Yusuf in ED holding.

## 2020-05-20 NOTE — H&P ADULT - PROBLEM SELECTOR PLAN 1
Pt w/ Plts at 7K. She could not get IVIG at [facility] given insurance delays.  - will start decadron 40mg daily x 4 days   - IVIG 1mg/kg daily x 2 doses; will need premedication 30 minutes prior to IVIG with tylenol 650mg PO and benadryl 25mg PO   - famotidine 20mg daily for ppx while on steroids  - plan for prednisone 100mg daily after completion of decadron Pt w/ Plts at 7K. She could not get IVIG at [facility] given insurance delays. Outpt w/u: negative HIV screen, VB12/CHRISTOPHE/dsDNA/TSH/T4 wnl  - will start decadron 40mg daily x 4 days   - IVIG 1mg/kg daily x 2 doses; will need premedication 30 minutes prior to IVIG with tylenol 650mg PO and benadryl 25mg PO   - famotidine 20mg daily for ppx while on steroids  - plan for prednisone 100mg daily after completion of decadron  - will check RF, H pylori Pt w/ plts at 7K. She could not get IVIG at [facility] given insurance delays. Outpt w/u: negative HIV screen, VB12/CHRISTOPHE/dsDNA/TSH/T4 wnl  - will start decadron 40mg daily x 4 days   - IVIG 1mg/kg daily x 2 doses; will need premedication 30 minutes prior to IVIG with tylenol 650mg PO and benadryl 25mg PO   - famotidine 20mg daily for ppx while on steroids  - plan for prednisone 100mg daily after completion of decadron  - will check RF, H pylori  - Heme recs  - f/u Bcx, Ucx  - consider BM biopsy

## 2020-05-20 NOTE — CONSULT NOTE ADULT - ATTENDING COMMENTS
20 yoF with new diagnosis of ITP started on IVIG in the hospital with improvement, discharged upon follow up was found to be thrombocytopenic a few days ago at the PCP's office, now with platelet count of 7 as outpatient today.     -starting IVIG 1g/kg over 2 days  -dex 40mg x 4 days with plans for taper

## 2020-05-20 NOTE — H&P ADULT - NSHPSOCIALHISTORY_GEN_ALL_CORE
No toxic habits. Lives with mother, brother, sister, niece. Just finished medical assistance program.

## 2020-05-20 NOTE — ED PROVIDER NOTE - OBJECTIVE STATEMENT
19 yo female with pmhx of recent hospitalization for pna 2 weeks ago with new dx ITP, presenting with low platelets. Pt was recently admitted for PNA at [facility] where she had petichiae, was found to be thrombocytopenic, suspected ITP, was given ivig and plt transfusion. Was discharged and began to follow with heme, was found to be persistently thrombocytopenic, received 1 u plt yesterday, Plt was 7 today, was recommended to be admitted for IVIG, steroids. Has had 1 day of petichiae on chest, lip and back of mouth. Denies any other complaints.    Denies blood in stool, N/V, CP, SOB

## 2020-05-20 NOTE — H&P ADULT - NSHPOUTPATIENTPROVIDERS_GEN_ALL_CORE
Heme: Dr. Linda Urrutia  PCP: Dr. Azam Pollack Heme: Dr. Linda Urrutia  [phone]  PCP: Dr. Azam Pollack  [phone]

## 2020-05-20 NOTE — H&P ADULT - NSHPREVIEWOFSYSTEMS_GEN_ALL_CORE
CONSTITUTIONAL: No weakness, fatigue, malaise, fevers or chills, no weight change, appetite change  EYES: No visual changes; No double vision,  No vertigo, eye pain  Ears: no otalgia, no otorhea, no hearing loss, tinnitus  Nose: no epistaxis, rhinorrhea, post-discharge, sinus pressure  Throat: no throat pain, no oral lesions, tooth pain   NECK: No pain or stiffness  RESPIRATORY: No cough (productive or dry), wheezing, hemoptysis; No shortness of breath, orthnopnea, PND, KAUR, snoring,  CARDIOVASCULAR: No chest pain or palpitations, no leg edema, no claudication    GASTROINTESTINAL: No abdominal or epigastric pain. No nausea, vomiting, or hematemesis; No diarrhea or constipation. No melena or hematochezia.  GENITOURINARY: No dysuria, frequency, urgency or hematuria, no pelvic pain, urinary incontinence, urgency  MSK: no joints or muscle pain, no swelling in joints or muscles  NEUROLOGICAL: No numbness or weakness, headache, memory loss, seizures, dizziness, vertigo, syncope, ataxia  SKIN: No pruritis, rashes, lesions or new moles  Psych: No anxiety, sadness, insomnia, suicide thoughts  Endocrine: No Heat or Cold intolerance, polydipsia, polyphagia  Heme/Lymph: no LN enlargement, no easy bruising or bleeding CONSTITUTIONAL: No weakness, fatigue, malaise, fevers or chills, no weight change, appetite change  Nose: no epistaxis, rhinorrhea   Throat: no throat pain   RESPIRATORY: No cough (productive or dry), wheezing; No shortness of breath, PND, KAUR   CARDIOVASCULAR: No chest pain or palpitations, no leg edema  GASTROINTESTINAL: No abdominal or epigastric pain. No nausea, vomiting, or hematemesis; No diarrhea or constipation. No melena or hematochezia.  GENITOURINARY: No dysuria, frequency, urgency or hematuria  MSK: no joints or muscle pain  NEUROLOGICAL: No numbness or weakness, headache, memory loss, dizziness  SKIN: No pruritis, rashes, lesions  Psych: No anxiety, sadness  Heme/Lymph:  easy bruising or bleeding

## 2020-05-20 NOTE — ED PROVIDER NOTE - CLINICAL SUMMARY MEDICAL DECISION MAKING FREE TEXT BOX
21 yo female with pmhx of recent hospitalization for pna c/b ITP dx presenting with persistent thrombocytopenia. D/w Dr. Pollack and hematology, will need IVIG and steroids, will evaluate thrombocytopenia with cbc, will assess tachycardia with ekg, cmp, will give IVF, will admit

## 2020-05-21 LAB
ALBUMIN SERPL ELPH-MCNC: 3.6 G/DL — SIGNIFICANT CHANGE UP (ref 3.3–5)
ALP SERPL-CCNC: 111 U/L — SIGNIFICANT CHANGE UP (ref 40–120)
ALT FLD-CCNC: 141 U/L — HIGH (ref 10–45)
ANION GAP SERPL CALC-SCNC: 12 MMOL/L — SIGNIFICANT CHANGE UP (ref 5–17)
AST SERPL-CCNC: 62 U/L — HIGH (ref 10–40)
BASOPHILS # BLD AUTO: 0.01 K/UL — SIGNIFICANT CHANGE UP (ref 0–0.2)
BASOPHILS NFR BLD AUTO: 0.1 % — SIGNIFICANT CHANGE UP (ref 0–2)
BILIRUB SERPL-MCNC: 0.4 MG/DL — SIGNIFICANT CHANGE UP (ref 0.2–1.2)
BUN SERPL-MCNC: 10 MG/DL — SIGNIFICANT CHANGE UP (ref 7–23)
CALCIUM SERPL-MCNC: 9.9 MG/DL — SIGNIFICANT CHANGE UP (ref 8.4–10.5)
CHLORIDE SERPL-SCNC: 105 MMOL/L — SIGNIFICANT CHANGE UP (ref 96–108)
CO2 SERPL-SCNC: 19 MMOL/L — LOW (ref 22–31)
CREAT SERPL-MCNC: 0.6 MG/DL — SIGNIFICANT CHANGE UP (ref 0.5–1.3)
EOSINOPHIL # BLD AUTO: 0 K/UL — SIGNIFICANT CHANGE UP (ref 0–0.5)
EOSINOPHIL NFR BLD AUTO: 0 % — SIGNIFICANT CHANGE UP (ref 0–6)
FERRITIN SERPL-MCNC: 72 NG/ML — SIGNIFICANT CHANGE UP (ref 15–150)
GLUCOSE SERPL-MCNC: 139 MG/DL — HIGH (ref 70–99)
HAV IGM SER-ACNC: SIGNIFICANT CHANGE UP
HBV CORE IGM SER-ACNC: SIGNIFICANT CHANGE UP
HBV SURFACE AG SER-ACNC: SIGNIFICANT CHANGE UP
HCT VFR BLD CALC: 35.7 % — SIGNIFICANT CHANGE UP (ref 34.5–45)
HCV AB S/CO SERPL IA: 0.44 S/CO — SIGNIFICANT CHANGE UP (ref 0–0.99)
HCV AB SERPL-IMP: SIGNIFICANT CHANGE UP
HGB BLD-MCNC: 11.5 G/DL — SIGNIFICANT CHANGE UP (ref 11.5–15.5)
IMM GRANULOCYTES NFR BLD AUTO: 0.3 % — SIGNIFICANT CHANGE UP (ref 0–1.5)
IRON SATN MFR SERPL: 11 % — LOW (ref 14–50)
IRON SATN MFR SERPL: 40 UG/DL — SIGNIFICANT CHANGE UP (ref 30–160)
LYMPHOCYTES # BLD AUTO: 1.47 K/UL — SIGNIFICANT CHANGE UP (ref 1–3.3)
LYMPHOCYTES # BLD AUTO: 15.9 % — SIGNIFICANT CHANGE UP (ref 13–44)
MAGNESIUM SERPL-MCNC: 1.8 MG/DL — SIGNIFICANT CHANGE UP (ref 1.6–2.6)
MCHC RBC-ENTMCNC: 27.3 PG — SIGNIFICANT CHANGE UP (ref 27–34)
MCHC RBC-ENTMCNC: 32.2 GM/DL — SIGNIFICANT CHANGE UP (ref 32–36)
MCV RBC AUTO: 84.6 FL — SIGNIFICANT CHANGE UP (ref 80–100)
MONOCYTES # BLD AUTO: 0.67 K/UL — SIGNIFICANT CHANGE UP (ref 0–0.9)
MONOCYTES NFR BLD AUTO: 7.3 % — SIGNIFICANT CHANGE UP (ref 2–14)
NEUTROPHILS # BLD AUTO: 7.04 K/UL — SIGNIFICANT CHANGE UP (ref 1.8–7.4)
NEUTROPHILS NFR BLD AUTO: 76.4 % — SIGNIFICANT CHANGE UP (ref 43–77)
NRBC # BLD: 0 /100 WBCS — SIGNIFICANT CHANGE UP (ref 0–0)
PHOSPHATE SERPL-MCNC: 2.5 MG/DL — SIGNIFICANT CHANGE UP (ref 2.5–4.5)
PLATELET # BLD AUTO: 14 K/UL — CRITICAL LOW (ref 150–400)
POTASSIUM SERPL-MCNC: 4.1 MMOL/L — SIGNIFICANT CHANGE UP (ref 3.5–5.3)
POTASSIUM SERPL-SCNC: 4.1 MMOL/L — SIGNIFICANT CHANGE UP (ref 3.5–5.3)
PROT SERPL-MCNC: 10.6 G/DL — HIGH (ref 6–8.3)
RBC # BLD: 4.22 M/UL — SIGNIFICANT CHANGE UP (ref 3.8–5.2)
RBC # FLD: 13.7 % — SIGNIFICANT CHANGE UP (ref 10.3–14.5)
RHEUMATOID FACT SERPL-ACNC: <10 IU/ML — SIGNIFICANT CHANGE UP (ref 0–13)
SODIUM SERPL-SCNC: 136 MMOL/L — SIGNIFICANT CHANGE UP (ref 135–145)
TIBC SERPL-MCNC: 351 UG/DL — SIGNIFICANT CHANGE UP (ref 220–430)
UIBC SERPL-MCNC: 312 UG/DL — SIGNIFICANT CHANGE UP (ref 110–370)
WBC # BLD: 9.22 K/UL — SIGNIFICANT CHANGE UP (ref 3.8–10.5)
WBC # FLD AUTO: 9.22 K/UL — SIGNIFICANT CHANGE UP (ref 3.8–10.5)

## 2020-05-21 PROCEDURE — 76700 US EXAM ABDOM COMPLETE: CPT | Mod: 26

## 2020-05-21 PROCEDURE — 99232 SBSQ HOSP IP/OBS MODERATE 35: CPT | Mod: GC

## 2020-05-21 PROCEDURE — 99232 SBSQ HOSP IP/OBS MODERATE 35: CPT

## 2020-05-21 RX ORDER — FERROUS SULFATE 325(65) MG
325 TABLET ORAL
Refills: 0 | Status: DISCONTINUED | OUTPATIENT
Start: 2020-05-21 | End: 2020-05-22

## 2020-05-21 RX ORDER — ASCORBIC ACID 60 MG
500 TABLET,CHEWABLE ORAL DAILY
Refills: 0 | Status: DISCONTINUED | OUTPATIENT
Start: 2020-05-21 | End: 2020-05-22

## 2020-05-21 RX ORDER — DEXAMETHASONE 0.5 MG/5ML
40 ELIXIR ORAL DAILY
Refills: 0 | Status: DISCONTINUED | OUTPATIENT
Start: 2020-05-21 | End: 2020-05-22

## 2020-05-21 RX ADMIN — Medication 1 MILLIGRAM(S): at 12:21

## 2020-05-21 RX ADMIN — Medication 40 MILLIGRAM(S): at 18:01

## 2020-05-21 RX ADMIN — Medication 650 MILLIGRAM(S): at 20:58

## 2020-05-21 RX ADMIN — Medication 500 MILLIGRAM(S): at 18:00

## 2020-05-21 RX ADMIN — Medication 25 MILLIGRAM(S): at 20:57

## 2020-05-21 RX ADMIN — Medication 325 MILLIGRAM(S): at 18:00

## 2020-05-21 RX ADMIN — FAMOTIDINE 20 MILLIGRAM(S): 10 INJECTION INTRAVENOUS at 12:21

## 2020-05-21 RX ADMIN — IMMUNE GLOBULIN (HUMAN) 125 GRAM(S): 10 INJECTION INTRAVENOUS; SUBCUTANEOUS at 21:24

## 2020-05-21 NOTE — PROGRESS NOTE ADULT - SUBJECTIVE AND OBJECTIVE BOX
INTERVAL HPI/OVERNIGHT EVENTS:  Patient S&E at bedside.   Afebrile.      VITAL SIGNS:  T(F): 98.2 (05-21-20 @ 04:52)  HR: 94 (05-21-20 @ 04:52)  BP: 132/81 (05-21-20 @ 04:52)  RR: 18 (05-21-20 @ 04:52)  SpO2: 99% (05-21-20 @ 04:52)      PHYSICAL EXAM:  Constitutional: NAD  Eyes: EOMI, sclera non-icteric  Neck: supple  Respiratory: normal respiratory effort   Cardiovascular: deferred   Gastrointestinal: non-distended   Extremities: no cyanosis, clubbing or edema   Neurological: awake and alert      MEDICATIONS  (STANDING):  dexAMETHasone  IVPB 40 milliGRAM(s) IV Intermittent daily  famotidine    Tablet 20 milliGRAM(s) Oral daily  folic acid 1 milliGRAM(s) Oral daily  immune   globulin 10% (GAMMAGARD) IVPB 75 Gram(s) IV Intermittent daily    MEDICATIONS  (PRN):  acetaminophen   Tablet .. 650 milliGRAM(s) Oral daily PRN Mild Pain (1 - 3)  diphenhydrAMINE 25 milliGRAM(s) Oral daily PRN Rash and/or Itching      Allergies  No Known Allergies        LABS:                        11.5   9.22  )-----------( 14       ( 21 May 2020 06:29 )             35.7     05-21    136  |  105  |  10  ----------------------------<  139<H>  4.1   |  19<L>  |  0.60    Ca    9.9      21 May 2020 06:29  Phos  2.5     05-21  Mg     1.8     05-21    TPro  10.6<H>  /  Alb  3.6  /  TBili  0.4  /  DBili  x   /  AST  62<H>  /  ALT  141<H>  /  AlkPhos  111  05-21    PT/INR - ( 20 May 2020 14:53 )   PT: 11.5 sec;   INR: 1.00 ratio         PTT - ( 20 May 2020 14:53 )  PTT:25.2 sec      RADIOLOGY & ADDITIONAL TESTS:  Studies reviewed.

## 2020-05-21 NOTE — PROGRESS NOTE ADULT - ATTENDING COMMENTS
This is a 20 year old female with ITP diagnosed last month at [facility] was given IVIG and then discharged but platelet count fell again. Saw Dr Urrutia in office, admitted for IVIG, given 1g/kg starting at 9pm last night in addition to steroids -dex 40mg   -platelet count this morning has improved from 7 to 14, will watch for further improvement tomorrow  -continue IVIG  -continue dex 40mg  -e/o iron def: can start ferrous sulfate daily, patient tolerates oral therapy without stomach issues  -plan for transition from dex to prednisone for further tapering and management of thrombocytopenia on discharge

## 2020-05-21 NOTE — PROGRESS NOTE ADULT - SUBJECTIVE AND OBJECTIVE BOX
Shan Garcia MD  Pager [phone] [facility] [phone]  Cell Phone [phone]    Patient is a 20y old  Female who presents with a chief complaint of ITP (21 May 2020 08:26)        SUBJECTIVE / OVERNIGHT EVENTS: Patient without complaints. Notes some petechiae on arms that presented last evening but no other signs of bleeding       MEDICATIONS  (STANDING):  ascorbic acid 500 milliGRAM(s) Oral daily  dexAMETHasone  IVPB 40 milliGRAM(s) IV Intermittent daily  famotidine    Tablet 20 milliGRAM(s) Oral daily  ferrous    sulfate 325 milliGRAM(s) Oral two times a day  folic acid 1 milliGRAM(s) Oral daily  immune   globulin 10% (GAMMAGARD) IVPB 75 Gram(s) IV Intermittent daily    MEDICATIONS  (PRN):  acetaminophen   Tablet .. 650 milliGRAM(s) Oral daily PRN Mild Pain (1 - 3)  diphenhydrAMINE 25 milliGRAM(s) Oral daily PRN Rash and/or Itching      Vital Signs Last 24 Hrs  T(C): 36.9 (21 May 2020 08:35), Max: 37.2 (20 May 2020 16:02)  T(F): 98.4 (21 May 2020 08:35), Max: 99 (20 May 2020 16:02)  HR: 92 (21 May 2020 08:35) (77 - 122)  BP: 139/95 (21 May 2020 08:35) (124/76 - 158/97)  BP(mean): --  RR: 18 (21 May 2020 08:35) (18 - 20)  SpO2: 100% (21 May 2020 08:35) (96% - 100%)  CAPILLARY BLOOD GLUCOSE        I&O's Summary    20 May 2020 07:01  -  21 May 2020 07:00  --------------------------------------------------------  IN: 800 mL / OUT: 0 mL / NET: 800 mL          PHYSICAL EXAM  GENERAL: NAD, well-developed  HEAD:  Atraumatic, Normocephalic  EYES: EOMI, PERRLA, conjunctiva and sclera clear  CHEST/LUNG: Clear to auscultation bilaterally; No wheeze  HEART: Regular rate and rhythm; No murmurs, rubs, or gallops  ABDOMEN: Soft, Nontender, Nondistended; Bowel sounds present  EXTREMITIES:  2+ Peripheral Pulses, No clubbing, cyanosis, or edema  PSYCH: AAOx3  DERM: Petechiae on b/l upper arms and chest      LABS:                        11.5   9.22  )-----------( 14       ( 21 May 2020 06:29 )             35.7     05-21    136  |  105  |  10  ----------------------------<  139<H>  4.1   |  19<L>  |  0.60    Ca    9.9      21 May 2020 06:29  Phos  2.5     05-21  Mg     1.8     05-21    TPro  10.6<H>  /  Alb  3.6  /  TBili  0.4  /  DBili  x   /  AST  62<H>  /  ALT  141<H>  /  AlkPhos  111  05-21    PT/INR - ( 20 May 2020 14:53 )   PT: 11.5 sec;   INR: 1.00 ratio         PTT - ( 20 May 2020 14:53 )  PTT:25.2 sec            RADIOLOGY & ADDITIONAL TESTS:    Imaging Personally Reviewed:  Consultant(s) Notes Reviewed:    Care Discussed with Consultants/Other Providers:

## 2020-05-21 NOTE — PROGRESS NOTE ADULT - PROBLEM SELECTOR PLAN 4
Transitions of Care Status:  1.  Name of PCP:   Dr. Azam Pollack  [phone]  2.  PCP Contacted on Admission: [x ] Y    [ ] N  by [facility].  3.  PCP contacted at Discharge: [ ] Y    [ ] N    [ ] N/A  4.  Post-Discharge Appointment Date and Location:  5.  Summary of Handoff given to PCP:

## 2020-05-21 NOTE — PROGRESS NOTE ADULT - ASSESSMENT
20F w/ ITP, sent in for management of thrombocytopenia.     # Idiopathic thrombocytopenic purpura (ITP):  - platelets increased from 8 --> 14   - s/p IVIG 75g at 9pm on 5/20  - dose #2 of IVIG to be given tonight (recommended 1g/kg, but pharmacy wanted to do IBW dosing which would have been 50g; gave inbetween dose).   - continue decadron 40mg PO for 4 days total  - continue famotidine 20mg daily for ppx while on steroids  - continue prednisone 100mg after completion of decadron.   - outpatient follow-up with Dr. Linda Urrutia    Will follow.     Sanjuanita Story MD  Hematology/Oncology Fellow, PGY-5  pager: [phone]  After 5pm or on weekends, please page the on-call fellow. 20F w/ ITP, sent in for management of thrombocytopenia.     # Idiopathic thrombocytopenic purpura (ITP):  - platelets increased from 8 --> 14   - s/p IVIG 75g at 9pm on 5/20  - dose #2 of IVIG to be given tonight (recommended 1g/kg, but pharmacy wanted to do IBW dosing which would have been 50g; gave inbetween dose).   - continue decadron 40mg PO for 4 days total  - continue famotidine 20mg daily for ppx while on steroids  - continue prednisone 100mg after completion of decadron.   - outpatient follow-up with Dr. Linda Urrutia    # Iron deficiency:  - normocytic anemia, but  iron saturation 11%, ferritin 72  - please start ferrous sulfate 325mg PO daily  - please administer with vitamin C for better uptake    # Transaminitis:  - unclear etiology, please check Abd US to assess liver (also check for splenomegaly as part of ITP workup)    Will follow.     Sanjuanita Story MD  Hematology/Oncology Fellow, PGY-5  pager: [phone]  After 5pm or on weekends, please page the on-call fellow.

## 2020-05-21 NOTE — PROGRESS NOTE ADULT - ASSESSMENT
Pt is a 21 yo F w/ PMHx eczema/asthma, recent hospitalization for ITP presenting with thrombocytopenia for IVIG.

## 2020-05-21 NOTE — PROGRESS NOTE ADULT - PROBLEM SELECTOR PLAN 1
Second dose of IVIG this evening  Decadron 40 mg PO daily x 4 days followed by Prednisone 100 mg PO daily   Famotidine 20 mg PO daily while on steroids

## 2020-05-22 ENCOUNTER — TRANSCRIPTION ENCOUNTER (OUTPATIENT)
Age: 20
End: 2020-05-22

## 2020-05-22 VITALS
OXYGEN SATURATION: 99 % | TEMPERATURE: 98 F | DIASTOLIC BLOOD PRESSURE: 88 MMHG | SYSTOLIC BLOOD PRESSURE: 139 MMHG | RESPIRATION RATE: 18 BRPM | HEART RATE: 96 BPM

## 2020-05-22 PROBLEM — J45.909 UNSPECIFIED ASTHMA, UNCOMPLICATED: Chronic | Status: ACTIVE | Noted: 2020-05-20

## 2020-05-22 PROBLEM — L30.9 DERMATITIS, UNSPECIFIED: Chronic | Status: ACTIVE | Noted: 2020-05-20

## 2020-05-22 LAB
ALBUMIN SERPL ELPH-MCNC: 3.1 G/DL — LOW (ref 3.3–5)
ALP SERPL-CCNC: 91 U/L — SIGNIFICANT CHANGE UP (ref 40–120)
ALT FLD-CCNC: 110 U/L — HIGH (ref 10–45)
ANION GAP SERPL CALC-SCNC: 10 MMOL/L — SIGNIFICANT CHANGE UP (ref 5–17)
AST SERPL-CCNC: 48 U/L — HIGH (ref 10–40)
BASOPHILS # BLD AUTO: 0.01 K/UL — SIGNIFICANT CHANGE UP (ref 0–0.2)
BASOPHILS NFR BLD AUTO: 0.1 % — SIGNIFICANT CHANGE UP (ref 0–2)
BILIRUB SERPL-MCNC: 0.4 MG/DL — SIGNIFICANT CHANGE UP (ref 0.2–1.2)
BUN SERPL-MCNC: 15 MG/DL — SIGNIFICANT CHANGE UP (ref 7–23)
CALCIUM SERPL-MCNC: 9.1 MG/DL — SIGNIFICANT CHANGE UP (ref 8.4–10.5)
CHLORIDE SERPL-SCNC: 103 MMOL/L — SIGNIFICANT CHANGE UP (ref 96–108)
CO2 SERPL-SCNC: 21 MMOL/L — LOW (ref 22–31)
CREAT SERPL-MCNC: 0.55 MG/DL — SIGNIFICANT CHANGE UP (ref 0.5–1.3)
EOSINOPHIL # BLD AUTO: 0 K/UL — SIGNIFICANT CHANGE UP (ref 0–0.5)
EOSINOPHIL NFR BLD AUTO: 0 % — SIGNIFICANT CHANGE UP (ref 0–6)
GLUCOSE SERPL-MCNC: 137 MG/DL — HIGH (ref 70–99)
H PYLORI AB SER-ACNC: 80 UNITS — HIGH
HCT VFR BLD CALC: 32.6 % — LOW (ref 34.5–45)
HGB BLD-MCNC: 11 G/DL — LOW (ref 11.5–15.5)
IMM GRANULOCYTES NFR BLD AUTO: 0.4 % — SIGNIFICANT CHANGE UP (ref 0–1.5)
LYMPHOCYTES # BLD AUTO: 1.51 K/UL — SIGNIFICANT CHANGE UP (ref 1–3.3)
LYMPHOCYTES # BLD AUTO: 10.9 % — LOW (ref 13–44)
MCHC RBC-ENTMCNC: 28.2 PG — SIGNIFICANT CHANGE UP (ref 27–34)
MCHC RBC-ENTMCNC: 33.7 GM/DL — SIGNIFICANT CHANGE UP (ref 32–36)
MCV RBC AUTO: 83.6 FL — SIGNIFICANT CHANGE UP (ref 80–100)
MONOCYTES # BLD AUTO: 0.35 K/UL — SIGNIFICANT CHANGE UP (ref 0–0.9)
MONOCYTES NFR BLD AUTO: 2.5 % — SIGNIFICANT CHANGE UP (ref 2–14)
NEUTROPHILS # BLD AUTO: 11.9 K/UL — HIGH (ref 1.8–7.4)
NEUTROPHILS NFR BLD AUTO: 86.1 % — HIGH (ref 43–77)
NRBC # BLD: 0 /100 WBCS — SIGNIFICANT CHANGE UP (ref 0–0)
PLATELET # BLD AUTO: 49 K/UL — LOW (ref 150–400)
POTASSIUM SERPL-MCNC: 4.2 MMOL/L — SIGNIFICANT CHANGE UP (ref 3.5–5.3)
POTASSIUM SERPL-SCNC: 4.2 MMOL/L — SIGNIFICANT CHANGE UP (ref 3.5–5.3)
PROT SERPL-MCNC: 11.3 G/DL — HIGH (ref 6–8.3)
RBC # BLD: 3.9 M/UL — SIGNIFICANT CHANGE UP (ref 3.8–5.2)
RBC # FLD: 14.1 % — SIGNIFICANT CHANGE UP (ref 10.3–14.5)
SODIUM SERPL-SCNC: 134 MMOL/L — LOW (ref 135–145)
WBC # BLD: 13.83 K/UL — HIGH (ref 3.8–10.5)
WBC # FLD AUTO: 13.83 K/UL — HIGH (ref 3.8–10.5)

## 2020-05-22 PROCEDURE — 76700 US EXAM ABDOM COMPLETE: CPT

## 2020-05-22 PROCEDURE — 80053 COMPREHEN METABOLIC PANEL: CPT

## 2020-05-22 PROCEDURE — 99232 SBSQ HOSP IP/OBS MODERATE 35: CPT | Mod: GC

## 2020-05-22 PROCEDURE — 83735 ASSAY OF MAGNESIUM: CPT

## 2020-05-22 PROCEDURE — 80074 ACUTE HEPATITIS PANEL: CPT

## 2020-05-22 PROCEDURE — 85730 THROMBOPLASTIN TIME PARTIAL: CPT

## 2020-05-22 PROCEDURE — 93005 ELECTROCARDIOGRAM TRACING: CPT

## 2020-05-22 PROCEDURE — 71045 X-RAY EXAM CHEST 1 VIEW: CPT

## 2020-05-22 PROCEDURE — 87040 BLOOD CULTURE FOR BACTERIA: CPT

## 2020-05-22 PROCEDURE — 99239 HOSP IP/OBS DSCHRG MGMT >30: CPT

## 2020-05-22 PROCEDURE — 86431 RHEUMATOID FACTOR QUANT: CPT

## 2020-05-22 PROCEDURE — 83605 ASSAY OF LACTIC ACID: CPT

## 2020-05-22 PROCEDURE — 85027 COMPLETE CBC AUTOMATED: CPT

## 2020-05-22 PROCEDURE — 83550 IRON BINDING TEST: CPT

## 2020-05-22 PROCEDURE — 84702 CHORIONIC GONADOTROPIN TEST: CPT

## 2020-05-22 PROCEDURE — 36430 TRANSFUSION BLD/BLD COMPNT: CPT

## 2020-05-22 PROCEDURE — 85610 PROTHROMBIN TIME: CPT

## 2020-05-22 PROCEDURE — 82728 ASSAY OF FERRITIN: CPT

## 2020-05-22 PROCEDURE — 83540 ASSAY OF IRON: CPT

## 2020-05-22 PROCEDURE — 84100 ASSAY OF PHOSPHORUS: CPT

## 2020-05-22 PROCEDURE — 99285 EMERGENCY DEPT VISIT HI MDM: CPT | Mod: 25

## 2020-05-22 PROCEDURE — 84443 ASSAY THYROID STIM HORMONE: CPT

## 2020-05-22 PROCEDURE — 86677 HELICOBACTER PYLORI ANTIBODY: CPT

## 2020-05-22 RX ORDER — DEXAMETHASONE 0.5 MG/5ML
10 ELIXIR ORAL
Qty: 0 | Refills: 0 | DISCHARGE

## 2020-05-22 RX ORDER — ASCORBIC ACID 60 MG
1 TABLET,CHEWABLE ORAL
Qty: 30 | Refills: 0
Start: 2020-05-22 | End: 2020-06-20

## 2020-05-22 RX ORDER — FOLIC ACID 0.8 MG
1 TABLET ORAL
Qty: 0 | Refills: 0 | DISCHARGE

## 2020-05-22 RX ORDER — FAMOTIDINE 10 MG/ML
0 INJECTION INTRAVENOUS
Qty: 0 | Refills: 0 | DISCHARGE

## 2020-05-22 RX ORDER — LANOLIN ALCOHOL/MO/W.PET/CERES
5 CREAM (GRAM) TOPICAL ONCE
Refills: 0 | Status: COMPLETED | OUTPATIENT
Start: 2020-05-22 | End: 2020-05-22

## 2020-05-22 RX ORDER — FERROUS SULFATE 325(65) MG
1 TABLET ORAL
Qty: 0 | Refills: 0 | DISCHARGE

## 2020-05-22 RX ORDER — FAMOTIDINE 10 MG/ML
1 INJECTION INTRAVENOUS
Qty: 30 | Refills: 0
Start: 2020-05-22 | End: 2020-06-20

## 2020-05-22 RX ORDER — FERROUS SULFATE 325(65) MG
1 TABLET ORAL
Qty: 60 | Refills: 0
Start: 2020-05-22 | End: 2020-06-20

## 2020-05-22 RX ORDER — FOLIC ACID 0.8 MG
1 TABLET ORAL
Qty: 30 | Refills: 0
Start: 2020-05-22 | End: 2020-06-20

## 2020-05-22 RX ADMIN — Medication 40 MILLIGRAM(S): at 05:08

## 2020-05-22 RX ADMIN — Medication 325 MILLIGRAM(S): at 05:08

## 2020-05-22 RX ADMIN — FAMOTIDINE 20 MILLIGRAM(S): 10 INJECTION INTRAVENOUS at 14:28

## 2020-05-22 RX ADMIN — Medication 1 MILLIGRAM(S): at 14:28

## 2020-05-22 RX ADMIN — Medication 500 MILLIGRAM(S): at 14:28

## 2020-05-22 RX ADMIN — Medication 5 MILLIGRAM(S): at 02:25

## 2020-05-22 RX ADMIN — Medication 325 MILLIGRAM(S): at 16:38

## 2020-05-22 NOTE — DISCHARGE NOTE PROVIDER - CARE PROVIDER_API CALL
Linda Urrutia  HEMATOLOGY  [address] [place] [id number]  Phone: [phone]  Fax: [phone]  Follow Up Time:     Azam Pollack)  Cardiology; Internal Medicine  [address] [facility], [address]  [place] [address]  Phone: [phone]  Fax: [phone]  Follow Up Time:

## 2020-05-22 NOTE — DISCHARGE NOTE PROVIDER - NSDCMRMEDTOKEN_GEN_ALL_CORE_FT
ascorbic acid 500 mg oral tablet: 1 tab(s) orally once a day  famotidine 20 mg oral tablet: 1 tab(s) orally once a day  ferrous sulfate 325 mg (65 mg elemental iron) oral tablet: 1 tab(s) orally 2 times a day   folic acid 1 mg oral tablet: 1 tab(s) orally once a day  predniSONE 50 mg oral tablet: 2 tab(s) orally once a day

## 2020-05-22 NOTE — DISCHARGE NOTE PROVIDER - NSDCCAREPROVSEEN_GEN_ALL_CORE_FT
Shan Garcia Shan Garcia  [facility] Hematology, Team  [facility] Medicine, Advance PracticeTeam

## 2020-05-22 NOTE — PROGRESS NOTE ADULT - SUBJECTIVE AND OBJECTIVE BOX
INTERVAL HPI/OVERNIGHT EVENTS:  Patient S&E at bedside.   Afebrile.   s/p second dose of IVIG yesterday.       VITAL SIGNS:  T(F): 98.5 (05-22-20 @ 08:49)  HR: 95 (05-22-20 @ 08:49)  BP: 139/91 (05-22-20 @ 08:49)  RR: 18 (05-22-20 @ 08:49)  SpO2: 96% (05-22-20 @ 08:49)      PHYSICAL EXAM:  Constitutional: NAD  Eyes: EOMI, sclera non-icteric  Neck: supple  Respiratory: normal respiratory effort    Cardiovascular: deferred   Gastrointestinal: non-distended   Extremities: no cyanosis, clubbing or edema   Neurological: awake and alert      MEDICATIONS  (STANDING):  ascorbic acid 500 milliGRAM(s) Oral daily  dexAMETHasone     Tablet 40 milliGRAM(s) Oral daily  famotidine    Tablet 20 milliGRAM(s) Oral daily  ferrous    sulfate 325 milliGRAM(s) Oral two times a day  folic acid 1 milliGRAM(s) Oral daily    MEDICATIONS  (PRN):  acetaminophen   Tablet .. 650 milliGRAM(s) Oral daily PRN Mild Pain (1 - 3)  diphenhydrAMINE 25 milliGRAM(s) Oral daily PRN Rash and/or Itching      Allergies  No Known Allergies        LABS:                        11.0   13.83 )-----------( 49       ( 22 May 2020 06:48 )             32.6     05-22    134<L>  |  103  |  15  ----------------------------<  137<H>  4.2   |  21<L>  |  0.55    Ca    9.1      22 May 2020 06:48  Phos  2.5     05-21  Mg     1.8     05-21    TPro  11.3<H>  /  Alb  3.1<L>  /  TBili  0.4  /  DBili  x   /  AST  48<H>  /  ALT  110<H>  /  AlkPhos  91  05-22    PT/INR - ( 20 May 2020 14:53 )   PT: 11.5 sec;   INR: 1.00 ratio         PTT - ( 20 May 2020 14:53 )  PTT:25.2 sec      RADIOLOGY & ADDITIONAL TESTS:  Studies reviewed.

## 2020-05-22 NOTE — DISCHARGE NOTE PROVIDER - CARE PROVIDERS DIRECT ADDRESSES
[email or web address]

## 2020-05-22 NOTE — DISCHARGE NOTE PROVIDER - NSDCHC_MEDRECSTATUS_GEN_ALL_CORE
[facility] Emergency Department    [address]    [facility] [address]    Phone:  [phone]    Fax:  [phone]                                       Farooq Sorto   MRN: [id number]    Department:  [facility] Emergency Department   Date of Visit:  6/23/2018           Patient Information     Date Of Birth          1994        Your diagnoses for this visit were:     Acute chest pain        You were seen by Levi Duncan MD.      Follow-up Information     Follow up with Daina Waldron MD In 3 days.    Specialty:  Internal Medicine    Contact information:    [address] [facility] [address]  [phone]          Discharge Instructions       Discharge Instructions  Chest Pain    You have been seen today for chest pain or discomfort.  At this time, your doctor has found no signs that your chest pain is due to a serious or life-threatening condition, (or you have declined more testing and/or admission to the hospital). However, sometimes there is a serious problem that does not show up right away. Your evaluation today may not be complete and you may need further testing and evaluation. You need to follow-up with your regular doctor within 3 days.    Return to the Emergency Department if:      Your chest pain changes, gets worse, starts to happen more often, or comes with less activity.    You are short of breath.    You get very weak or tired.    You pass out or faint.    You have any new symptoms, like fever, cough, numb legs, or you cough up blood    You have anything else that worries you.    Until you follow-up with your regular doctor please do the following:      If you have questions, contact your regular doctor.    If your doctor today has told you to follow-up with your regular doctor, it is very important that you make an appointment with your clinic and go to the appointment.  If you do not follow-up with your primary doctor, it may 
result in missing an important development which could result in permanent injury or disability and/or lasting pain.  If there is any problem keeping your appointment, call your doctor or return to the Emergency Department.      Remember that you can always come back to the Emergency Department if you are not able to see your regular doctor in the amount of time listed above, if you get any new symptoms, or if there is anything that worries you.        24 Hour Appointment Hotline       To make an appointment at any [facility], call [phone] ([phone]). If you don't have a family doctor or clinic, we will help you find one. [place] clinics are conveniently located to serve the needs of you and your family.             Review of your medicines      Our records show that you are taking the medicines listed below. If these are incorrect, please call your family doctor or clinic.        Dose / Directions Last dose taken    amantadine 100 MG capsule   Commonly known as:  SYMMETREL   Dose:  100 mg        Take 100 mg by mouth 2 times daily   Refills:  0        balneol Lotn lotion   Quantity:  90 mL        Apply to rectal area as needed for comfort   Refills:  0        benzoyl peroxide 10 % topical gel   Quantity:  28 g        Apply topically 2 times daily   Refills:  3        CLONAZEPAM PO   Dose:  0.5 mg        Take 0.5 mg by mouth   Refills:  0        GUANFACINE HCL PO   Dose:  2 mg        Take 2 mg by mouth 2 times daily   Refills:  0        hydrocortisone 2.5 % cream   Commonly known as:  ANUSOL-HC   Quantity:  30 g        Place rectally 2 times daily   Refills:  0        IBUPROFEN PO   Dose:  600 mg        Take 600 mg by mouth every 6 hours as needed for moderate pain   Refills:  0        lamoTRIgine 200 MG tablet   Commonly known as:  LaMICtal   Dose:  500 mg        Take 500 mg by mouth 2 times daily   Refills:  0        levETIRAcetam 500 MG tablet   Commonly known as:  KEPPRA   Dose:  500 mg        
Take 500 mg by mouth 2 times daily   Refills:  0        OLANZapine 10 MG tablet   Commonly known as:  zyPREXA   Dose:  10 mg   Quantity:  90 tablet        Take 1 tablet (10 mg) by mouth At Bedtime   Refills:  0        Q- MG tablet   Dose:  325 mg   Generic drug:  acetaminophen        Take 325 mg by mouth every 4 hours as needed for mild pain or fever   Refills:  0        ranitidine 150 MG tablet   Commonly known as:  ZANTAC   Quantity:  180 tablet        TAKE 1 TABLET(150 MG) BY MOUTH TWICE DAILY   Refills:  2        sennosides 8.6 MG tablet   Commonly known as:  SENOKOT   Dose:  1 tablet        Take 1 tablet by mouth daily as needed for constipation   Refills:  0        VIMPAT PO   Dose:  200 mg        Take 200 mg by mouth 2 times daily   Refills:  0                Procedures and tests performed during your visit     Basic metabolic panel (BMP)    CBC + differential    EKG 12 lead    Troponin I    XR Chest 2 Views      Orders Needing Specimen Collection     None      Pending Results     Date and Time Order Name Status Description    6/23/2018 0021 EKG 12 lead Preliminary             Pending Culture Results     No orders found from 6/21/2018 to 6/24/2018.            Pending Results Instructions     If you had any lab results that were not finalized at the time of your Discharge, you can call the ED Lab Result RN at [phone]. You will be contacted by this team for any positive Lab results or changes in treatment. The nurses are available 7 days a week from 10A to 6:30P.  You can leave a message 24 hours per day and they will return your call.        Test Results From Your Hospital Stay        6/23/2018  1:44 AM      Component Results     Component Value Ref Range & Units Status    WBC 9.5 4.0 - 11.0 10e9/L Final    RBC Count 5.11 4.4 - 5.9 10e12/L Final    Hemoglobin 15.6 13.3 - 17.7 g/dL Final    Hematocrit 46.0 40.0 - 53.0 % Final    MCV 90 78 - 100 fl Final    MCH 30.5 26.5 - 33.0 pg Final    MCHC 33.9 
31.5 - 36.5 g/dL Final    RDW 11.9 10.0 - 15.0 % Final    Platelet Count 356 150 - 450 10e9/L Final    Diff Method Automated Method  Final    % Neutrophils 64.3 % Final    % Lymphocytes 27.8 % Final    % Monocytes 7.2 % Final    % Eosinophils 0.1 % Final    % Basophils 0.2 % Final    % Immature Granulocytes 0.4 % Final    Nucleated RBCs 0 0 /100 Final    Absolute Neutrophil 6.1 1.6 - 8.3 10e9/L Final    Absolute Lymphocytes 2.7 0.8 - 5.3 10e9/L Final    Absolute Monocytes 0.7 0.0 - 1.3 10e9/L Final    Absolute Eosinophils 0.0 0.0 - 0.7 10e9/L Final    Absolute Basophils 0.0 0.0 - 0.2 10e9/L Final    Abs Immature Granulocytes 0.0 0 - 0.4 10e9/L Final    Absolute Nucleated RBC 0.0  Final         6/23/2018  2:03 AM      Component Results     Component Value Ref Range & Units Status    Sodium 138 133 - 144 mmol/L Final    Potassium 3.8 3.4 - 5.3 mmol/L Final    Chloride 103 94 - 109 mmol/L Final    Carbon Dioxide 27 20 - 32 mmol/L Final    Anion Gap 8 3 - 14 mmol/L Final    Glucose 91 70 - 99 mg/dL Final    Urea Nitrogen 8 7 - 30 mg/dL Final    Creatinine 0.79 0.66 - 1.25 mg/dL Final    GFR Estimate >90 >60 mL/min/1.7m2 Final    Non  GFR Calc    GFR Estimate If Black >90 >60 mL/min/1.7m2 Final    African American GFR Calc    Calcium 9.1 8.5 - 10.1 mg/dL Final         6/23/2018  2:03 AM      Component Results     Component Value Ref Range & Units Status    Troponin I ES <0.015 0.000 - 0.045 ug/L Final    The 99th percentile for upper reference range is 0.045 ug/L.  Troponin values   in the range of 0.045 - 0.120 ug/L may be associated with risks of adverse   clinical events.           6/23/2018  2:16 AM      Narrative     XR CHEST 2 VW   6/23/2018 2:00 AM     INDICATION: Chest pain.    COMPARISON: 12/19/2017.        Impression     IMPRESSION: No infiltrates or other acute findings. Normal-sized  cardiac silhouette. No pneumothorax.    PAM DALAL MD                Clinical Quality Measure: Blood 
Pressure Screening     Your blood pressure was checked while you were in the emergency department today. The last reading we obtained was  BP: (!) 142/93 . Please read the guidelines below about what these numbers mean and what you should do about them.  If your systolic blood pressure (the top number) is less than 120 and your diastolic blood pressure (the bottom number) is less than 80, then your blood pressure is normal. There is nothing more that you need to do about it.  If your systolic blood pressure (the top number) is 120-139 or your diastolic blood pressure (the bottom number) is 80-89, your blood pressure may be higher than it should be. You should have your blood pressure rechecked within a year by a primary care provider.  If your systolic blood pressure (the top number) is 140 or greater or your diastolic blood pressure (the bottom number) is 90 or greater, you may have high blood pressure. High blood pressure is treatable, but if left untreated over time it can put you at risk for heart attack, stroke, or kidney failure. You should have your blood pressure rechecked by a primary care provider within the next 4 weeks.  If your provider in the emergency department today gave you specific instructions to follow-up with your doctor or provider even sooner than that, you should follow that instruction and not wait for up to 4 weeks for your follow-up visit.        Thank you for choosing [facility]       Thank you for choosing [facility] for your care. Our goal is always to provide you with excellent care. Hearing back from our patients is one way we can continue to improve our services. Please take a few minutes to complete the written survey that you may receive in the mail after you visit with us. Thank you!        [facility] Information     [facility] lets you send messages to your doctor, view your test results, renew your prescriptions, schedule appointments and more. To sign up, go to [email or web address] 
". Click on \"Log in\" on the left side of the screen, which will take you to the Welcome page. Then click on \"Sign up Now\" on the right side of the page.     You will be asked to enter the access code listed below, as well as some personal information. Please follow the directions to create your username and password.     Your access code is: [email or web address]  Expires: 2018  2:27 PM     Your access code will  in 90 days. If you need help or a new code, please call your [facility] clinic or [phone].        Care EveryWhere ID     This is your Care EveryWhere ID. This could be used by other organizations to access your [facility] medical records  [id number]        Equal Access to Services     KOLTON LOGAN : Chuy Mccarthy, wajai alaniz, sunita zaragoza, janny paige. So [facility] [phone].    ATENCIÓN: Si habla español, tiene a eagle disposición servicios gratuitos de asistencia lingüística. Llame al [phone].    We comply with applicable federal civil rights laws and [place] laws. We do not discriminate on the basis of race, color, national origin, age, disability, sex, sexual orientation, or gender identity.            After Visit Summary       This is your record. Keep this with you and show to your community pharmacist(s) and doctor(s) at your next visit.                  "
Admission Reconciliation is Completed  Discharge Reconciliation is Completed
no

## 2020-05-22 NOTE — DISCHARGE NOTE PROVIDER - HOSPITAL COURSE
19 yo hx ITP p/w thrombocytopenia and IVIG treatment as unable to get as outpatient due to insurance issues. Received two treatments and PO decadron and thrombocytopenia began to improve. D/C home on Prednisone 100 mg PO daily with famotidine for GI prophylaxis.

## 2020-05-22 NOTE — PROGRESS NOTE ADULT - SUBJECTIVE AND OBJECTIVE BOX
Shan Garcia MD  Pager [phone]  [facility] [phone]  Cell Phone [phone]    Patient is a 20y old  Female who presents with a chief complaint of ITP (22 May 2020 10:02)        SUBJECTIVE / OVERNIGHT EVENTS: No new events      MEDICATIONS  (STANDING):  ascorbic acid 500 milliGRAM(s) Oral daily  dexAMETHasone     Tablet 40 milliGRAM(s) Oral daily  famotidine    Tablet 20 milliGRAM(s) Oral daily  ferrous    sulfate 325 milliGRAM(s) Oral two times a day  folic acid 1 milliGRAM(s) Oral daily    MEDICATIONS  (PRN):  acetaminophen   Tablet .. 650 milliGRAM(s) Oral daily PRN Mild Pain (1 - 3)  diphenhydrAMINE 25 milliGRAM(s) Oral daily PRN Rash and/or Itching      Vital Signs Last 24 Hrs  T(C): 36.9 (22 May 2020 08:49), Max: 37.3 (21 May 2020 22:29)  T(F): 98.5 (22 May 2020 08:49), Max: 99.1 (21 May 2020 22:29)  HR: 95 (22 May 2020 08:49) (65 - 95)  BP: 139/91 (22 May 2020 08:49) (134/71 - 150/84)  BP(mean): --  RR: 18 (22 May 2020 08:49) (18 - 18)  SpO2: 96% (22 May 2020 08:49) (96% - 99%)  CAPILLARY BLOOD GLUCOSE        I&O's Summary    21 May 2020 07:01  -  22 May 2020 07:00  --------------------------------------------------------  IN: 420 mL / OUT: 0 mL / NET: 420 mL          PHYSICAL EXAM  GENERAL: NAD, well-developed  HEAD:  Atraumatic, Normocephalic  EYES: EOMI, PERRLA, conjunctiva and sclera clear  CHEST/LUNG: Clear to auscultation bilaterally; No wheeze  HEART: Regular rate and rhythm; No murmurs, rubs, or gallops  ABDOMEN: Soft, Nontender, Nondistended; Bowel sounds present  EXTREMITIES:  2+ Peripheral Pulses, No clubbing, cyanosis, or edema  PSYCH: AAOx3  DERM: Petechiae on b/l upper arms and chest      LABS:                        11.0   13.83 )-----------( 49       ( 22 May 2020 06:48 )             32.6     05-22    134<L>  |  103  |  15  ----------------------------<  137<H>  4.2   |  21<L>  |  0.55    Ca    9.1      22 May 2020 06:48  Phos  2.5     05-21  Mg     1.8     05-21    TPro  11.3<H>  /  Alb  3.1<L>  /  TBili  0.4  /  DBili  x   /  AST  48<H>  /  ALT  110<H>  /  AlkPhos  91  05-22    PT/INR - ( 20 May 2020 14:53 )   PT: 11.5 sec;   INR: 1.00 ratio         PTT - ( 20 May 2020 14:53 )  PTT:25.2 sec          Culture - Blood (collected 20 May 2020 17:22)  Source: .Blood Blood-Peripheral  Preliminary Report (21 May 2020 18:01):    No growth to date.    Culture - Blood (collected 20 May 2020 17:22)  Source: .Blood Blood-Peripheral  Preliminary Report (21 May 2020 18:01):    No growth to date.        RADIOLOGY & ADDITIONAL TESTS:    Imaging Personally Reviewed:  Consultant(s) Notes Reviewed:    Care Discussed with Consultants/Other Providers:

## 2020-05-22 NOTE — PROGRESS NOTE ADULT - ASSESSMENT
20F w/ ITP, sent in for management of thrombocytopenia.     # Idiopathic thrombocytopenic purpura (ITP):  - platelets increased from 8 --> 14 --> 49  - s/p IVIG 75g on 5/20 and 5/21   - continue decadron 40mg PO for 4 days total --> then   - continue famotidine 20mg daily for ppx while on steroids  - continue prednisone 100mg after completion of decadron.   - outpatient follow-up with Dr. Linda Urrutia    # Iron deficiency:  - normocytic anemia, but  iron saturation 11%, ferritin 72  - continue ferrous sulfate and vitamin C at discharge   - please administer with vitamin C for better uptake    # Transaminitis:  - improving, abdominal US without hepatosplenomegaly.     Possible discharge home today with outpatient follow-up with Dr. Rosa.  Will confirm with Medicine.         Sanjuanita Story MD  Hematology/Oncology Fellow, PGY-5  pager: [phone]  After 5pm or on weekends, please page the on-call fellow. 20F w/ ITP, sent in for management of thrombocytopenia.     # Idiopathic thrombocytopenic purpura (ITP):  - platelets increased from 8 --> 14 --> 49  - s/p IVIG 75g on 5/20 and 5/21   - continue decadron 40mg PO for 4 days total --> then   - continue famotidine 20mg daily for ppx while on steroids  - continue prednisone 100mg after completion of decadron.   - outpatient follow-up with Dr. Linda Urrutia    # Iron deficiency:  - normocytic anemia, but  iron saturation 11%, ferritin 72  - continue ferrous sulfate and vitamin C at discharge   - please administer with vitamin C for better uptake    # Transaminitis:  - improving, abdominal US without hepatosplenomegaly.     OK to discharge today with outpatient follow-up with Dr. Rosa.    Discussed with Dr. Garcia.         Sanjuanita Story MD  Hematology/Oncology Fellow, PGY-5  pager: [phone]  After 5pm or on weekends, please page the on-call fellow.

## 2020-05-22 NOTE — PROGRESS NOTE ADULT - PROBLEM SELECTOR PLAN 4
Transitions of Care Status:  1.  Name of PCP:   Dr. Azam Pollack  [phone]  2.  PCP Contacted on Admission: [x ] Y    [ ] N  by [employer] Email.  3.  PCP contacted at Discharge: [ ] Y    [ ] N    [ ] N/A  4.  Post-Discharge Appointment Date and Location:  5.  Summary of Handoff given to PCP:    Discharge home today if cleared by hematology. 40 minutes spent discharging the patient.

## 2020-05-22 NOTE — DISCHARGE NOTE NURSING/CASE MANAGEMENT/SOCIAL WORK - PATIENT PORTAL LINK FT
You can access the FollowMyHealth Patient Portal offered by [facility] by registering at the following website: [email or web address]. By joining [employer]’s FollowMyHealth portal, you will also be able to view your health information using other applications (apps) compatible with our system.

## 2020-05-22 NOTE — PROGRESS NOTE ADULT - PROBLEM SELECTOR PLAN 1
Received two doses of IVIG   Cont Decadron 40 mg PO daily x 4 days followed by Prednisone 100 mg PO daily   Famotidine 20 mg PO daily while on steroids

## 2020-05-24 LAB
APPEARANCE: CLEAR
BACTERIA: ABNORMAL
BASOPHILS # BLD AUTO: 0.03 K/UL
BASOPHILS # BLD AUTO: 0.05 K/UL
BASOPHILS NFR BLD AUTO: 0.5 %
BASOPHILS NFR BLD AUTO: 0.7 %
BILIRUBIN URINE: NEGATIVE
BLOOD URINE: ABNORMAL
COLOR: NORMAL
EOSINOPHIL # BLD AUTO: 0.06 K/UL
EOSINOPHIL # BLD AUTO: 0.07 K/UL
EOSINOPHIL NFR BLD AUTO: 1 %
EOSINOPHIL NFR BLD AUTO: 1 %
FERRITIN SERPL-MCNC: 102 NG/ML
FOLATE SERPL-MCNC: 15.5 NG/ML
GLUCOSE QUALITATIVE U: NEGATIVE
HCT VFR BLD CALC: 37.4 %
HCT VFR BLD CALC: 39.3 %
HGB BLD-MCNC: 12.1 G/DL
HGB BLD-MCNC: 12.6 G/DL
HYALINE CASTS: 0 /LPF
IMM GRANULOCYTES NFR BLD AUTO: 0.1 %
IMM GRANULOCYTES NFR BLD AUTO: 0.2 %
IRON SATN MFR SERPL: 10 %
IRON SERPL-MCNC: 38 UG/DL
KETONES URINE: NEGATIVE
LEUKOCYTE ESTERASE URINE: ABNORMAL
LYMPHOCYTES # BLD AUTO: 2.61 K/UL
LYMPHOCYTES # BLD AUTO: 3.5 K/UL
LYMPHOCYTES NFR BLD AUTO: 44.4 %
LYMPHOCYTES NFR BLD AUTO: 48.3 %
MAN DIFF?: NORMAL
MAN DIFF?: NORMAL
MCHC RBC-ENTMCNC: 27.4 PG
MCHC RBC-ENTMCNC: 27.6 PG
MCHC RBC-ENTMCNC: 32.1 GM/DL
MCHC RBC-ENTMCNC: 32.4 GM/DL
MCV RBC AUTO: 84.8 FL
MCV RBC AUTO: 86 FL
MICROSCOPIC-UA: NORMAL
MONOCYTES # BLD AUTO: 0.62 K/UL
MONOCYTES # BLD AUTO: 0.74 K/UL
MONOCYTES NFR BLD AUTO: 10.2 %
MONOCYTES NFR BLD AUTO: 10.5 %
NEUTROPHILS # BLD AUTO: 2.55 K/UL
NEUTROPHILS # BLD AUTO: 2.88 K/UL
NEUTROPHILS NFR BLD AUTO: 39.7 %
NEUTROPHILS NFR BLD AUTO: 43.4 %
NITRITE URINE: NEGATIVE
PH URINE: 6
PLATELET # BLD AUTO: 26 K/UL
PLATELET # BLD AUTO: 32 K/UL
PLATELET # PLAS AUTO: 29 K/UL
PROTEIN URINE: NEGATIVE
RBC # BLD: 4.41 M/UL
RBC # BLD: 4.57 M/UL
RBC # FLD: 13.5 %
RBC # FLD: 13.7 %
RED BLOOD CELLS URINE: 5 /HPF
SARS-COV-2 IGG SERPL IA-ACNC: 0.1 INDEX
SARS-COV-2 IGG SERPL QL IA: NEGATIVE
SPECIFIC GRAVITY URINE: 1.02
SQUAMOUS EPITHELIAL CELLS: 13 /HPF
TIBC SERPL-MCNC: 382 UG/DL
UIBC SERPL-MCNC: 345 UG/DL
UROBILINOGEN URINE: NORMAL
VIT B12 SERPL-MCNC: 497 PG/ML
WBC # FLD AUTO: 5.88 K/UL
WBC # FLD AUTO: 7.25 K/UL
WHITE BLOOD CELLS URINE: 14 /HPF

## 2020-05-25 LAB
CULTURE RESULTS: SIGNIFICANT CHANGE UP
CULTURE RESULTS: SIGNIFICANT CHANGE UP
SPECIMEN SOURCE: SIGNIFICANT CHANGE UP
SPECIMEN SOURCE: SIGNIFICANT CHANGE UP

## 2020-05-26 ENCOUNTER — APPOINTMENT (OUTPATIENT)
Dept: HEMATOLOGY ONCOLOGY | Facility: CLINIC | Age: 20
End: 2020-05-26

## 2020-05-27 ENCOUNTER — APPOINTMENT (OUTPATIENT)
Dept: HEMATOLOGY ONCOLOGY | Facility: CLINIC | Age: 20
End: 2020-05-27

## 2020-05-29 ENCOUNTER — RESULT REVIEW (OUTPATIENT)
Age: 20
End: 2020-05-29

## 2020-05-29 ENCOUNTER — LABORATORY RESULT (OUTPATIENT)
Age: 20
End: 2020-05-29

## 2020-05-29 ENCOUNTER — APPOINTMENT (OUTPATIENT)
Dept: HEMATOLOGY ONCOLOGY | Facility: CLINIC | Age: 20
End: 2020-05-29
Payer: COMMERCIAL

## 2020-05-29 VITALS
SYSTOLIC BLOOD PRESSURE: 132 MMHG | WEIGHT: 224.21 LBS | DIASTOLIC BLOOD PRESSURE: 92 MMHG | OXYGEN SATURATION: 98 % | RESPIRATION RATE: 16 BRPM | HEART RATE: 74 BPM

## 2020-05-29 DIAGNOSIS — Z78.9 OTHER SPECIFIED HEALTH STATUS: ICD-10-CM

## 2020-05-29 LAB
BASOPHILS # BLD AUTO: 0 K/UL — SIGNIFICANT CHANGE UP (ref 0–0.2)
BASOPHILS NFR BLD AUTO: 0 % — SIGNIFICANT CHANGE UP (ref 0–2)
EOSINOPHIL # BLD AUTO: 0 K/UL — SIGNIFICANT CHANGE UP (ref 0–0.5)
EOSINOPHIL NFR BLD AUTO: 0 % — SIGNIFICANT CHANGE UP (ref 0–6)
HCT VFR BLD CALC: 36.6 % — SIGNIFICANT CHANGE UP (ref 34.5–45)
HGB BLD-MCNC: 12.1 G/DL — SIGNIFICANT CHANGE UP (ref 11.5–15.5)
LYMPHOCYTES # BLD AUTO: 37 % — SIGNIFICANT CHANGE UP (ref 13–44)
LYMPHOCYTES # BLD AUTO: 9.12 K/UL — HIGH (ref 1–3.3)
MCHC RBC-ENTMCNC: 28 PG — SIGNIFICANT CHANGE UP (ref 27–34)
MCHC RBC-ENTMCNC: 33.1 GM/DL — SIGNIFICANT CHANGE UP (ref 32–36)
MCV RBC AUTO: 84.7 FL — SIGNIFICANT CHANGE UP (ref 80–100)
MONOCYTES # BLD AUTO: 1.23 K/UL — HIGH (ref 0–0.9)
MONOCYTES NFR BLD AUTO: 5 % — SIGNIFICANT CHANGE UP (ref 2–14)
NEUTROPHILS # BLD AUTO: 14.29 K/UL — HIGH (ref 1.8–7.4)
NEUTROPHILS NFR BLD AUTO: 58 % — SIGNIFICANT CHANGE UP (ref 43–77)
NRBC # BLD: 0 /100 — SIGNIFICANT CHANGE UP (ref 0–0)
NRBC # BLD: SIGNIFICANT CHANGE UP /100 WBCS (ref 0–0)
PLAT MORPH BLD: NORMAL — SIGNIFICANT CHANGE UP
PLATELET # BLD AUTO: 448 K/UL — HIGH (ref 150–400)
RBC # BLD: 4.32 M/UL — SIGNIFICANT CHANGE UP (ref 3.8–5.2)
RBC # FLD: 14.3 % — SIGNIFICANT CHANGE UP (ref 10.3–14.5)
RBC BLD AUTO: SIGNIFICANT CHANGE UP
WBC # BLD: 24.64 K/UL — HIGH (ref 3.8–10.5)
WBC # FLD AUTO: 24.64 K/UL — HIGH (ref 3.8–10.5)

## 2020-05-29 PROCEDURE — 99214 OFFICE O/P EST MOD 30 MIN: CPT

## 2020-05-29 RX ORDER — DEXAMETHASONE 4 MG/1
4 TABLET ORAL DAILY
Qty: 40 | Refills: 0 | Status: DISCONTINUED | COMMUNITY
Start: 2020-05-19 | End: 2020-05-29

## 2020-05-29 NOTE — ASSESSMENT
[FreeTextEntry1] : This is a 20 year old female with acute thrombocytopenia.  Initially diagnosed at [facility] in the end of April- no records.  She was treated with cefdinir/zithromax for a RLL pneumonia as well as IVIG/plt transfusion. \par No steroids given per patient. \par \par Diagnosis of ITP +/- medication induced though she has been off of medications for >3 weeks now.  \par IVIG 75gm x 2 w/ decadron given inpatient.\par Plts 448 today, WBC elevated (24.64) in the setting of steroids.\par Continue prednisone 80mg w/ pepcid daily; taper as her plt tolerate. \par CBC weekly\par Continue to f/u w/ Dr. Pollack\par \par RTO in 1 month\par Case and management discussed with Dr. Urrutia

## 2020-05-29 NOTE — HISTORY OF PRESENT ILLNESS
[de-identified] : This is a 20 year old female with a history of eczema/asthma who is here for an evaluation of thrombocytopenia.  She was admitted to [facility] for 3 days at the end of April for ITP.  She noticed petechiae on her chest wall, leg, mouth bleeding, longer menstrual cycle (9 days instead of 4- not more heavy).  She was found to have low plt, unknown level.  She was given a platelet transfusion followed by IVIG.  She was seen by hematology, unknown who.  She was not started on any steroids.  She was discharged, called her PCP, Dr. Pollack the following day for recurrent oral bleeding.  She was seen at [facility] plt at that time was 181,000 on 4/27.  She was seen by Dr. Pollack yesterday, plt were found to be 32, repeated later 26.  During the hospitalization, she was told she had a RLL pneumonia and was treated with cefdinir and azithromycin.  \par Today she complains of petechiae around her left nipple/breast, some oral bleeding, bruising on her RLE.  Otherwise no fever/chills, no cough, no chest pain, no SOB, no abdominal c/o.  She has not yet had her period yet, late but denies being sexually active.  \par  [de-identified] : Patient presents for follow up appointment. In the interim, pt was thrombocytopenic (plts 7) on 5/20 was scheduled to get IVIG treatment but was unable to get as outpatient due to insurance issues. Sent to [facility], admitted from 5/20-5/22, received two treatments of IVIG 75gm and PO decadron with improvement in thrombocytopenia (plts 49). Discharged home on Prednisone 100 mg PO daily with famotidine for GI prophylaxis. Patient denies lightheadedness, fatigue, palpitations, petechiae, easy bleeding, easy bruising, melena, BRBPR, fever, chills, headache, abdominal pain, chest pain, shortness of breath, or peripheral edema. LMP regular, 5/21/2020.\par

## 2020-05-29 NOTE — PHYSICAL EXAM
[Normal] : normal appearance, no rash, nodules, vesicles, ulcers, erythema [Restricted in physically strenuous activity but ambulatory and able to carry out work of a light or sedentary nature] : Status 1- Restricted in physically strenuous activity but ambulatory and able to carry out work of a light or sedentary nature, e.g., light house work, office work [de-identified] : Eczema on her b/l LE, back. No petechiae

## 2020-05-31 LAB
APPEARANCE: CLEAR
BACTERIA UR CULT: NORMAL
BILIRUBIN URINE: NEGATIVE
BLOOD URINE: ABNORMAL
COLOR: NORMAL
GLUCOSE QUALITATIVE U: NEGATIVE
KETONES URINE: NEGATIVE
LEUKOCYTE ESTERASE URINE: ABNORMAL
NITRITE URINE: NEGATIVE
PH URINE: 6.5
PROTEIN URINE: NORMAL
SPECIFIC GRAVITY URINE: 1.03
UROBILINOGEN URINE: NORMAL

## 2020-06-01 ENCOUNTER — APPOINTMENT (OUTPATIENT)
Dept: CARDIOLOGY | Facility: CLINIC | Age: 20
End: 2020-06-01
Payer: COMMERCIAL

## 2020-06-01 ENCOUNTER — NON-APPOINTMENT (OUTPATIENT)
Age: 20
End: 2020-06-01

## 2020-06-01 VITALS
OXYGEN SATURATION: 99 % | HEIGHT: 62 IN | TEMPERATURE: 98.4 F | DIASTOLIC BLOOD PRESSURE: 82 MMHG | SYSTOLIC BLOOD PRESSURE: 126 MMHG | WEIGHT: 224 LBS | BODY MASS INDEX: 41.22 KG/M2 | HEART RATE: 105 BPM

## 2020-06-01 DIAGNOSIS — R07.89 OTHER CHEST PAIN: ICD-10-CM

## 2020-06-01 DIAGNOSIS — R82.90 UNSPECIFIED ABNORMAL FINDINGS IN URINE: ICD-10-CM

## 2020-06-01 DIAGNOSIS — Z13.228 ENCOUNTER FOR SCREENING FOR OTHER METABOLIC DISORDERS: ICD-10-CM

## 2020-06-01 DIAGNOSIS — K13.70 UNSPECIFIED LESIONS OF ORAL MUCOSA: ICD-10-CM

## 2020-06-01 DIAGNOSIS — J18.9 PNEUMONIA, UNSPECIFIED ORGANISM: ICD-10-CM

## 2020-06-01 LAB
ALBUMIN SERPL ELPH-MCNC: 3.8 G/DL
ALP BLD-CCNC: 84 U/L
ALT SERPL-CCNC: 44 U/L
ANION GAP SERPL CALC-SCNC: 14 MMOL/L
AST SERPL-CCNC: 26 U/L
BASOPHILS # BLD AUTO: 0.03 K/UL
BASOPHILS NFR BLD AUTO: 0.1 %
BILIRUB SERPL-MCNC: <0.2 MG/DL
BUN SERPL-MCNC: 20 MG/DL
CALCIUM SERPL-MCNC: 10.3 MG/DL
CHLORIDE SERPL-SCNC: 100 MMOL/L
CO2 SERPL-SCNC: 24 MMOL/L
CREAT SERPL-MCNC: 0.76 MG/DL
EOSINOPHIL # BLD AUTO: 0.01 K/UL
EOSINOPHIL NFR BLD AUTO: 0 %
FRUCTOSAMINE SERPL-MCNC: 255 UMOL/L
GLUCOSE SERPL-MCNC: 108 MG/DL
HCT VFR BLD CALC: 40.3 %
HGB BLD-MCNC: 12.8 G/DL
IMM GRANULOCYTES NFR BLD AUTO: 1.1 %
LYMPHOCYTES # BLD AUTO: 3.86 K/UL
LYMPHOCYTES NFR BLD AUTO: 18.1 %
MAN DIFF?: NORMAL
MCHC RBC-ENTMCNC: 28.2 PG
MCHC RBC-ENTMCNC: 31.8 GM/DL
MCV RBC AUTO: 88.8 FL
MONOCYTES # BLD AUTO: 0.51 K/UL
MONOCYTES NFR BLD AUTO: 2.4 %
NEUTROPHILS # BLD AUTO: 16.63 K/UL
NEUTROPHILS NFR BLD AUTO: 78.3 %
PLATELET # BLD AUTO: 605 K/UL
POTASSIUM SERPL-SCNC: 5.1 MMOL/L
PROT SERPL-MCNC: 8.3 G/DL
RBC # BLD: 4.54 M/UL
RBC # FLD: 14.6 %
SODIUM SERPL-SCNC: 138 MMOL/L
WBC # FLD AUTO: 21.28 K/UL

## 2020-06-01 PROCEDURE — 93306 TTE W/DOPPLER COMPLETE: CPT

## 2020-06-01 PROCEDURE — 99214 OFFICE O/P EST MOD 30 MIN: CPT

## 2020-06-01 PROCEDURE — 93000 ELECTROCARDIOGRAM COMPLETE: CPT

## 2020-06-01 RX ORDER — CHLORHEXIDINE GLUCONATE 4 %
325 (65 FE) LIQUID (ML) TOPICAL
Qty: 60 | Refills: 0 | Status: COMPLETED | COMMUNITY
Start: 2020-05-22 | End: 2020-06-01

## 2020-06-01 NOTE — PHYSICAL EXAM
[General Appearance - Well Developed] : well developed [Well Groomed] : well groomed [Normal Appearance] : normal appearance [No Deformities] : no deformities [General Appearance - Well Nourished] : well nourished [General Appearance - In No Acute Distress] : no acute distress [Normal Conjunctiva] : the conjunctiva exhibited no abnormalities [Eyelids - No Xanthelasma] : the eyelids demonstrated no xanthelasmas [Normal Oral Mucosa] : normal oral mucosa [No Oral Pallor] : no oral pallor [No Oral Cyanosis] : no oral cyanosis [Normal Jugular Venous A Waves Present] : normal jugular venous A waves present [Normal Jugular Venous V Waves Present] : normal jugular venous V waves present [No Jugular Venous Mullins A Waves] : no jugular venous mullins A waves [Respiration, Rhythm And Depth] : normal respiratory rhythm and effort [Auscultation Breath Sounds / Voice Sounds] : lungs were clear to auscultation bilaterally [Exaggerated Use Of Accessory Muscles For Inspiration] : no accessory muscle use [Heart Rate And Rhythm] : heart rate and rhythm were normal [Heart Sounds] : normal S1 and S2 [Murmurs] : no murmurs present [Abdomen Tenderness] : non-tender [Abdomen Soft] : soft [Abdomen Mass (___ Cm)] : no abdominal mass palpated [Abnormal Walk] : normal gait [Nail Clubbing] : no clubbing of the fingernails [Gait - Sufficient For Exercise Testing] : the gait was sufficient for exercise testing [Petechial Hemorrhages (___cm)] : no petechial hemorrhages [Cyanosis, Localized] : no localized cyanosis [Skin Color & Pigmentation] : normal skin color and pigmentation [] : no rash [No Venous Stasis] : no venous stasis [Skin Lesions] : no skin lesions [No Skin Ulcers] : no skin ulcer [No Xanthoma] : no  xanthoma was observed [Oriented To Time, Place, And Person] : oriented to person, place, and time [Affect] : the affect was normal [Mood] : the mood was normal [No Anxiety] : not feeling anxious

## 2020-06-05 ENCOUNTER — LABORATORY RESULT (OUTPATIENT)
Age: 20
End: 2020-06-05

## 2020-06-07 LAB
APPEARANCE: ABNORMAL
BACTERIA UR CULT: NORMAL
BACTERIA: ABNORMAL
BILIRUBIN URINE: NEGATIVE
BLOOD URINE: NEGATIVE
CALCIUM OXALATE CRYSTALS: ABNORMAL
COLOR: YELLOW
GLUCOSE QUALITATIVE U: NEGATIVE
HYALINE CASTS: 0 /LPF
KETONES URINE: NEGATIVE
LEUKOCYTE ESTERASE URINE: ABNORMAL
MICROSCOPIC-UA: NORMAL
NITRITE URINE: NEGATIVE
PH URINE: 7.5
PROTEIN URINE: NORMAL
RED BLOOD CELLS URINE: 6 /HPF
SPECIFIC GRAVITY URINE: 1.03
SQUAMOUS EPITHELIAL CELLS: 12 /HPF
UROBILINOGEN URINE: NORMAL
WHITE BLOOD CELLS URINE: 66 /HPF

## 2020-06-08 ENCOUNTER — APPOINTMENT (OUTPATIENT)
Dept: OTOLARYNGOLOGY | Facility: CLINIC | Age: 20
End: 2020-06-08

## 2020-06-12 ENCOUNTER — LABORATORY RESULT (OUTPATIENT)
Age: 20
End: 2020-06-12

## 2020-06-15 ENCOUNTER — TRANSCRIPTION ENCOUNTER (OUTPATIENT)
Age: 20
End: 2020-06-15

## 2020-06-17 ENCOUNTER — RESULT REVIEW (OUTPATIENT)
Age: 20
End: 2020-06-17

## 2020-06-17 ENCOUNTER — APPOINTMENT (OUTPATIENT)
Dept: HEMATOLOGY ONCOLOGY | Facility: CLINIC | Age: 20
End: 2020-06-17
Payer: COMMERCIAL

## 2020-06-17 VITALS
HEART RATE: 110 BPM | WEIGHT: 227.08 LBS | SYSTOLIC BLOOD PRESSURE: 136 MMHG | OXYGEN SATURATION: 100 % | TEMPERATURE: 99 F | DIASTOLIC BLOOD PRESSURE: 95 MMHG | RESPIRATION RATE: 18 BRPM

## 2020-06-17 LAB
BASOPHILS # BLD AUTO: 0.18 K/UL — SIGNIFICANT CHANGE UP (ref 0–0.2)
BASOPHILS NFR BLD AUTO: 1 % — SIGNIFICANT CHANGE UP (ref 0–2)
EOSINOPHIL # BLD AUTO: 0 K/UL — SIGNIFICANT CHANGE UP (ref 0–0.5)
EOSINOPHIL NFR BLD AUTO: 0 % — SIGNIFICANT CHANGE UP (ref 0–6)
HCT VFR BLD CALC: 37.2 % — SIGNIFICANT CHANGE UP (ref 34.5–45)
HGB BLD-MCNC: 12.4 G/DL — SIGNIFICANT CHANGE UP (ref 11.5–15.5)
LYMPHOCYTES # BLD AUTO: 52 % — HIGH (ref 13–44)
LYMPHOCYTES # BLD AUTO: 9.37 K/UL — HIGH (ref 1–3.3)
MCHC RBC-ENTMCNC: 28.5 PG — SIGNIFICANT CHANGE UP (ref 27–34)
MCHC RBC-ENTMCNC: 33.3 GM/DL — SIGNIFICANT CHANGE UP (ref 32–36)
MCV RBC AUTO: 85.5 FL — SIGNIFICANT CHANGE UP (ref 80–100)
MONOCYTES # BLD AUTO: 0.36 K/UL — SIGNIFICANT CHANGE UP (ref 0–0.9)
MONOCYTES NFR BLD AUTO: 2 % — SIGNIFICANT CHANGE UP (ref 2–14)
NEUTROPHILS # BLD AUTO: 8.11 K/UL — HIGH (ref 1.8–7.4)
NEUTROPHILS NFR BLD AUTO: 45 % — SIGNIFICANT CHANGE UP (ref 43–77)
NRBC # BLD: 0 /100 — SIGNIFICANT CHANGE UP (ref 0–0)
NRBC # BLD: SIGNIFICANT CHANGE UP /100 WBCS (ref 0–0)
PLAT MORPH BLD: NORMAL — SIGNIFICANT CHANGE UP
PLATELET # BLD AUTO: 248 K/UL — SIGNIFICANT CHANGE UP (ref 150–400)
RBC # BLD: 4.35 M/UL — SIGNIFICANT CHANGE UP (ref 3.8–5.2)
RBC # FLD: 14.2 % — SIGNIFICANT CHANGE UP (ref 10.3–14.5)
RBC BLD AUTO: SIGNIFICANT CHANGE UP
WBC # BLD: 18.02 K/UL — HIGH (ref 3.8–10.5)
WBC # FLD AUTO: 18.02 K/UL — HIGH (ref 3.8–10.5)

## 2020-06-17 PROCEDURE — 99213 OFFICE O/P EST LOW 20 MIN: CPT

## 2020-06-23 NOTE — HISTORY OF PRESENT ILLNESS
[de-identified] : ITP\par \par This is a 20 year old female with a history of eczema/asthma who is here for an evaluation of thrombocytopenia.  She was admitted to [facility] for 3 days at the end of April for ITP.  She noticed petechiae on her chest wall, leg, mouth bleeding, longer menstrual cycle (9 days instead of 4- not more heavy).  She was found to have low plt, unknown level.  She was given a platelet transfusion followed by IVIG.  She was seen by hematology, unknown who.  She was not started on any steroids.  She was discharged, called her PCP, Dr. Pollack the following day for recurrent oral bleeding.  She was seen at [facility], plt at that time was 181,000 on 4/27.  She was seen by Dr. Pollack yesterday, plt were found to be 32, repeated later 26.  During the hospitalization, she was told she had a RLL pneumonia and was treated with cefdinir and azithromycin.   [de-identified] : Patient presents for follow up appointment.  Overall feeling well, working/in school  She is on prednisone 80 mg a day.  Gaining weight.  \par Denies any bleeding. She has no chest pain, no SOB, no abdominal c/o.  \par

## 2020-06-23 NOTE — PHYSICAL EXAM
[Obese] : obese [Restricted in physically strenuous activity but ambulatory and able to carry out work of a light or sedentary nature] : Status 1- Restricted in physically strenuous activity but ambulatory and able to carry out work of a light or sedentary nature, e.g., light house work, office work [Normal] : affect appropriate

## 2020-06-23 NOTE — ASSESSMENT
[FreeTextEntry1] : This is a 20 year old female with ITP.  Initially diagnosed at [facility] in the end of April- no records.  She was treated with cefdinir/zithromax for a RLL pneumonia as well as IVIG/plt transfusion. \par No steroids given per patient. \par Relapsed- given IVIG 75 gm x 2 days, with prednisone 1 mg/kg. \par Her plt remain normal.  \par Will taper steroids slowly, by 10 mg every week with close monitoring for relapse. \par Started 70 mg by Friday, repeat CBC every Friday. \par Follow up in the office in 4 weeks.  \par Would start bactrim for PCP prophylaxis but will check her G6PD level first. \par \par

## 2020-06-25 ENCOUNTER — OUTPATIENT (OUTPATIENT)
Dept: OUTPATIENT SERVICES | Facility: HOSPITAL | Age: 20
LOS: 1 days | Discharge: HOME | End: 2020-06-25

## 2020-06-25 DIAGNOSIS — D64.9 ANEMIA, UNSPECIFIED: ICD-10-CM

## 2020-07-01 ENCOUNTER — APPOINTMENT (OUTPATIENT)
Dept: HEMATOLOGY ONCOLOGY | Facility: CLINIC | Age: 20
End: 2020-07-01

## 2020-07-02 ENCOUNTER — LABORATORY RESULT (OUTPATIENT)
Age: 20
End: 2020-07-02

## 2020-07-20 ENCOUNTER — APPOINTMENT (OUTPATIENT)
Dept: HEMATOLOGY ONCOLOGY | Facility: CLINIC | Age: 20
End: 2020-07-20

## 2020-09-04 ENCOUNTER — OUTPATIENT (OUTPATIENT)
Dept: OUTPATIENT SERVICES | Facility: HOSPITAL | Age: 20
LOS: 1 days | Discharge: HOME | End: 2020-09-04

## 2020-09-04 DIAGNOSIS — D64.9 ANEMIA, UNSPECIFIED: ICD-10-CM

## 2020-09-10 ENCOUNTER — APPOINTMENT (OUTPATIENT)
Dept: HEMATOLOGY ONCOLOGY | Facility: CLINIC | Age: 20
End: 2020-09-10
Payer: COMMERCIAL

## 2020-09-10 ENCOUNTER — RESULT REVIEW (OUTPATIENT)
Age: 20
End: 2020-09-10

## 2020-09-10 VITALS
SYSTOLIC BLOOD PRESSURE: 136 MMHG | WEIGHT: 229.28 LBS | DIASTOLIC BLOOD PRESSURE: 91 MMHG | HEIGHT: 62.72 IN | HEART RATE: 92 BPM | BODY MASS INDEX: 41.14 KG/M2 | RESPIRATION RATE: 16 BRPM | TEMPERATURE: 98.4 F | OXYGEN SATURATION: 100 %

## 2020-09-10 LAB
BASOPHILS # BLD AUTO: 0.04 K/UL — SIGNIFICANT CHANGE UP (ref 0–0.2)
BASOPHILS NFR BLD AUTO: 0.7 % — SIGNIFICANT CHANGE UP (ref 0–2)
EOSINOPHIL # BLD AUTO: 0.23 K/UL — SIGNIFICANT CHANGE UP (ref 0–0.5)
EOSINOPHIL NFR BLD AUTO: 4 % — SIGNIFICANT CHANGE UP (ref 0–6)
HCT VFR BLD CALC: 35.9 % — SIGNIFICANT CHANGE UP (ref 34.5–45)
HGB BLD-MCNC: 12.4 G/DL — SIGNIFICANT CHANGE UP (ref 11.5–15.5)
IMM GRANULOCYTES NFR BLD AUTO: 1.4 % — SIGNIFICANT CHANGE UP (ref 0–1.5)
LYMPHOCYTES # BLD AUTO: 2.4 K/UL — SIGNIFICANT CHANGE UP (ref 1–3.3)
LYMPHOCYTES # BLD AUTO: 41.7 % — SIGNIFICANT CHANGE UP (ref 13–44)
MCHC RBC-ENTMCNC: 28.3 PG — SIGNIFICANT CHANGE UP (ref 27–34)
MCHC RBC-ENTMCNC: 34.5 G/DL — SIGNIFICANT CHANGE UP (ref 32–36)
MCV RBC AUTO: 82 FL — SIGNIFICANT CHANGE UP (ref 80–100)
MONOCYTES # BLD AUTO: 0.51 K/UL — SIGNIFICANT CHANGE UP (ref 0–0.9)
MONOCYTES NFR BLD AUTO: 8.9 % — SIGNIFICANT CHANGE UP (ref 2–14)
NEUTROPHILS # BLD AUTO: 2.5 K/UL — SIGNIFICANT CHANGE UP (ref 1.8–7.4)
NEUTROPHILS NFR BLD AUTO: 43.3 % — SIGNIFICANT CHANGE UP (ref 43–77)
NRBC # BLD: 0 /100 WBCS — SIGNIFICANT CHANGE UP (ref 0–0)
PLATELET # BLD AUTO: 23 K/UL — LOW (ref 150–400)
RBC # BLD: 4.38 M/UL — SIGNIFICANT CHANGE UP (ref 3.8–5.2)
RBC # FLD: 13.7 % — SIGNIFICANT CHANGE UP (ref 10.3–14.5)
WBC # BLD: 5.76 K/UL — SIGNIFICANT CHANGE UP (ref 3.8–10.5)
WBC # FLD AUTO: 5.76 K/UL — SIGNIFICANT CHANGE UP (ref 3.8–10.5)

## 2020-09-10 PROCEDURE — 99214 OFFICE O/P EST MOD 30 MIN: CPT

## 2020-09-10 NOTE — HISTORY OF PRESENT ILLNESS
[de-identified] : ITP\par \par This is a 20 year old female with a history of eczema/asthma who is here for an evaluation of thrombocytopenia.  She was admitted to [facility] for 3 days at the end of April for ITP.  She noticed petechiae on her chest wall, leg, mouth bleeding, longer menstrual cycle (9 days instead of 4- not more heavy).  She was found to have low plt, unknown level.  She was given a platelet transfusion followed by IVIG.  She was seen by hematology, unknown who.  She was not started on any steroids.  She was discharged, called her PCP, Dr. Pollack the following day for recurrent oral bleeding.  She was seen at [facility], plt at that time was 181,000 on 4/27.  She was seen by Dr. Pollack yesterday, plt were found to be 32, repeated later 26.  During the hospitalization, she was told she had a RLL pneumonia and was treated with cefdinir and azithromycin.   [de-identified] : Patient presents for follow up appointment.  She has been off the steroids for a few weeks, denies any bleeding.  Has a couple of slight headaches, not today.  No visual changes.  She also has some epigastric discomfort- states they both occur when her plt are low. \par She has no bleeding, no fever/chills, no cough + seasonal allergies/sinusitis, no chest pain, no SOB, no abdominal c/o.

## 2020-09-10 NOTE — REVIEW OF SYSTEMS
[Negative] : Allergic/Immunologic [Vomiting] : no vomiting [Diarrhea] : no diarrhea [Constipation] : no constipation [FreeTextEntry4] : allergies/sinusitis [FreeTextEntry7] : slight epigastric discomfort

## 2020-09-11 ENCOUNTER — RESULT REVIEW (OUTPATIENT)
Age: 20
End: 2020-09-11

## 2020-09-11 ENCOUNTER — APPOINTMENT (OUTPATIENT)
Dept: HEMATOLOGY ONCOLOGY | Facility: CLINIC | Age: 20
End: 2020-09-11

## 2020-09-11 ENCOUNTER — APPOINTMENT (OUTPATIENT)
Dept: INFUSION THERAPY | Facility: HOSPITAL | Age: 20
End: 2020-09-11

## 2020-09-11 LAB
BASOPHILS # BLD AUTO: 0 K/UL — SIGNIFICANT CHANGE UP (ref 0–0.2)
BASOPHILS NFR BLD AUTO: 0 % — SIGNIFICANT CHANGE UP (ref 0–2)
EOSINOPHIL # BLD AUTO: 0 K/UL — SIGNIFICANT CHANGE UP (ref 0–0.5)
EOSINOPHIL NFR BLD AUTO: 0 % — SIGNIFICANT CHANGE UP (ref 0–6)
HCT VFR BLD CALC: 36.3 % — SIGNIFICANT CHANGE UP (ref 34.5–45)
HGB BLD-MCNC: 12.5 G/DL — SIGNIFICANT CHANGE UP (ref 11.5–15.5)
LYMPHOCYTES # BLD AUTO: 0.98 K/UL — LOW (ref 1–3.3)
LYMPHOCYTES # BLD AUTO: 10 % — LOW (ref 13–44)
MCHC RBC-ENTMCNC: 27.9 PG — SIGNIFICANT CHANGE UP (ref 27–34)
MCHC RBC-ENTMCNC: 34.4 G/DL — SIGNIFICANT CHANGE UP (ref 32–36)
MCV RBC AUTO: 81 FL — SIGNIFICANT CHANGE UP (ref 80–100)
MONOCYTES # BLD AUTO: 0.69 K/UL — SIGNIFICANT CHANGE UP (ref 0–0.9)
MONOCYTES NFR BLD AUTO: 7 % — SIGNIFICANT CHANGE UP (ref 2–14)
NEUTROPHILS # BLD AUTO: 8.17 K/UL — HIGH (ref 1.8–7.4)
NEUTROPHILS NFR BLD AUTO: 83 % — HIGH (ref 43–77)
NRBC # BLD: 0 /100 — SIGNIFICANT CHANGE UP (ref 0–0)
NRBC # BLD: SIGNIFICANT CHANGE UP /100 WBCS (ref 0–0)
PLAT MORPH BLD: NORMAL — SIGNIFICANT CHANGE UP
PLATELET # BLD AUTO: 59 K/UL — LOW (ref 150–400)
RBC # BLD: 4.48 M/UL — SIGNIFICANT CHANGE UP (ref 3.8–5.2)
RBC # FLD: 13.5 % — SIGNIFICANT CHANGE UP (ref 10.3–14.5)
RBC BLD AUTO: SIGNIFICANT CHANGE UP
WBC # BLD: 9.84 K/UL — SIGNIFICANT CHANGE UP (ref 3.8–10.5)
WBC # FLD AUTO: 9.84 K/UL — SIGNIFICANT CHANGE UP (ref 3.8–10.5)

## 2020-09-12 ENCOUNTER — APPOINTMENT (OUTPATIENT)
Dept: INFUSION THERAPY | Facility: HOSPITAL | Age: 20
End: 2020-09-12

## 2020-09-13 DIAGNOSIS — D69.3 IMMUNE THROMBOCYTOPENIC PURPURA: ICD-10-CM

## 2020-09-13 DIAGNOSIS — Z51.11 ENCOUNTER FOR ANTINEOPLASTIC CHEMOTHERAPY: ICD-10-CM

## 2020-09-13 DIAGNOSIS — R11.2 NAUSEA WITH VOMITING, UNSPECIFIED: ICD-10-CM

## 2020-09-14 ENCOUNTER — APPOINTMENT (OUTPATIENT)
Dept: HEMATOLOGY ONCOLOGY | Facility: CLINIC | Age: 20
End: 2020-09-14

## 2020-09-14 ENCOUNTER — APPOINTMENT (OUTPATIENT)
Dept: INFUSION THERAPY | Facility: HOSPITAL | Age: 20
End: 2020-09-14

## 2020-09-14 ENCOUNTER — RESULT REVIEW (OUTPATIENT)
Age: 20
End: 2020-09-14

## 2020-09-14 LAB
BASOPHILS # BLD AUTO: 0 K/UL — SIGNIFICANT CHANGE UP (ref 0–0.2)
BASOPHILS NFR BLD AUTO: 0 % — SIGNIFICANT CHANGE UP (ref 0–2)
EOSINOPHIL # BLD AUTO: 0 K/UL — SIGNIFICANT CHANGE UP (ref 0–0.5)
EOSINOPHIL NFR BLD AUTO: 0 % — SIGNIFICANT CHANGE UP (ref 0–6)
HCT VFR BLD CALC: 35.5 % — SIGNIFICANT CHANGE UP (ref 34.5–45)
HGB BLD-MCNC: 12.1 G/DL — SIGNIFICANT CHANGE UP (ref 11.5–15.5)
LYMPHOCYTES # BLD AUTO: 32 % — SIGNIFICANT CHANGE UP (ref 13–44)
LYMPHOCYTES # BLD AUTO: 4.56 K/UL — HIGH (ref 1–3.3)
MCHC RBC-ENTMCNC: 28.3 PG — SIGNIFICANT CHANGE UP (ref 27–34)
MCHC RBC-ENTMCNC: 34.1 G/DL — SIGNIFICANT CHANGE UP (ref 32–36)
MCV RBC AUTO: 83.1 FL — SIGNIFICANT CHANGE UP (ref 80–100)
MONOCYTES # BLD AUTO: 0.57 K/UL — SIGNIFICANT CHANGE UP (ref 0–0.9)
MONOCYTES NFR BLD AUTO: 4 % — SIGNIFICANT CHANGE UP (ref 2–14)
NEUTROPHILS # BLD AUTO: 9.11 K/UL — HIGH (ref 1.8–7.4)
NEUTROPHILS NFR BLD AUTO: 64 % — SIGNIFICANT CHANGE UP (ref 43–77)
NRBC # BLD: 0 /100 — SIGNIFICANT CHANGE UP (ref 0–0)
NRBC # BLD: SIGNIFICANT CHANGE UP /100 WBCS (ref 0–0)
PLAT MORPH BLD: NORMAL — SIGNIFICANT CHANGE UP
PLATELET # BLD AUTO: 228 K/UL — SIGNIFICANT CHANGE UP (ref 150–400)
RBC # BLD: 4.27 M/UL — SIGNIFICANT CHANGE UP (ref 3.8–5.2)
RBC # FLD: 13.8 % — SIGNIFICANT CHANGE UP (ref 10.3–14.5)
RBC BLD AUTO: SIGNIFICANT CHANGE UP
WBC # BLD: 14.24 K/UL — HIGH (ref 3.8–10.5)
WBC # FLD AUTO: 14.24 K/UL — HIGH (ref 3.8–10.5)

## 2020-10-14 ENCOUNTER — OUTPATIENT (OUTPATIENT)
Dept: OUTPATIENT SERVICES | Facility: HOSPITAL | Age: 20
LOS: 1 days | Discharge: HOME | End: 2020-10-14

## 2020-10-14 DIAGNOSIS — D69.3 IMMUNE THROMBOCYTOPENIC PURPURA: ICD-10-CM

## 2020-10-16 ENCOUNTER — RESULT REVIEW (OUTPATIENT)
Age: 20
End: 2020-10-16

## 2020-10-16 ENCOUNTER — APPOINTMENT (OUTPATIENT)
Dept: HEMATOLOGY ONCOLOGY | Facility: CLINIC | Age: 20
End: 2020-10-16
Payer: COMMERCIAL

## 2020-10-16 VITALS
HEIGHT: 61.22 IN | OXYGEN SATURATION: 98 % | HEART RATE: 85 BPM | WEIGHT: 233.69 LBS | BODY MASS INDEX: 44.12 KG/M2 | TEMPERATURE: 97.2 F | RESPIRATION RATE: 16 BRPM | DIASTOLIC BLOOD PRESSURE: 89 MMHG | SYSTOLIC BLOOD PRESSURE: 135 MMHG

## 2020-10-16 LAB
BASOPHILS # BLD AUTO: 0 K/UL — SIGNIFICANT CHANGE UP (ref 0–0.2)
BASOPHILS NFR BLD AUTO: 0 % — SIGNIFICANT CHANGE UP (ref 0–2)
EOSINOPHIL # BLD AUTO: 0 K/UL — SIGNIFICANT CHANGE UP (ref 0–0.5)
EOSINOPHIL NFR BLD AUTO: 0 % — SIGNIFICANT CHANGE UP (ref 0–6)
HCT VFR BLD CALC: 39.9 % — SIGNIFICANT CHANGE UP (ref 34.5–45)
HGB BLD-MCNC: 12.9 G/DL — SIGNIFICANT CHANGE UP (ref 11.5–15.5)
LYMPHOCYTES # BLD AUTO: 46 % — HIGH (ref 13–44)
LYMPHOCYTES # BLD AUTO: 5.3 K/UL — HIGH (ref 1–3.3)
MCHC RBC-ENTMCNC: 28.4 PG — SIGNIFICANT CHANGE UP (ref 27–34)
MCHC RBC-ENTMCNC: 32.3 G/DL — SIGNIFICANT CHANGE UP (ref 32–36)
MCV RBC AUTO: 87.7 FL — SIGNIFICANT CHANGE UP (ref 80–100)
MONOCYTES # BLD AUTO: 0.35 K/UL — SIGNIFICANT CHANGE UP (ref 0–0.9)
MONOCYTES NFR BLD AUTO: 3 % — SIGNIFICANT CHANGE UP (ref 2–14)
NEUTROPHILS # BLD AUTO: 5.88 K/UL — SIGNIFICANT CHANGE UP (ref 1.8–7.4)
NEUTROPHILS NFR BLD AUTO: 51 % — SIGNIFICANT CHANGE UP (ref 43–77)
NRBC # BLD: 0 /100 — SIGNIFICANT CHANGE UP (ref 0–0)
NRBC # BLD: SIGNIFICANT CHANGE UP /100 WBCS (ref 0–0)
PLAT MORPH BLD: NORMAL — SIGNIFICANT CHANGE UP
PLATELET # BLD AUTO: 299 K/UL — SIGNIFICANT CHANGE UP (ref 150–400)
RBC # BLD: 4.55 M/UL — SIGNIFICANT CHANGE UP (ref 3.8–5.2)
RBC # FLD: 13.8 % — SIGNIFICANT CHANGE UP (ref 10.3–14.5)
RBC BLD AUTO: SIGNIFICANT CHANGE UP
WBC # BLD: 11.52 K/UL — HIGH (ref 3.8–10.5)
WBC # FLD AUTO: 11.52 K/UL — HIGH (ref 3.8–10.5)

## 2020-10-16 PROCEDURE — 99214 OFFICE O/P EST MOD 30 MIN: CPT

## 2020-10-16 RX ORDER — IRON POLYSACCHARIDE COMPLEX 150 MG
150 CAPSULE ORAL
Qty: 90 | Refills: 1 | Status: DISCONTINUED | COMMUNITY
Start: 2020-05-12 | End: 2020-10-16

## 2020-10-16 RX ORDER — UBIDECARENONE/VIT E ACET 100MG-5
50 MCG CAPSULE ORAL
Qty: 90 | Refills: 1 | Status: DISCONTINUED | COMMUNITY
Start: 2020-05-12 | End: 2020-10-16

## 2020-10-16 RX ORDER — PREDNISONE 20 MG/1
20 TABLET ORAL DAILY
Qty: 60 | Refills: 0 | Status: DISCONTINUED | COMMUNITY
Start: 2020-09-10 | End: 2020-10-16

## 2020-10-16 RX ORDER — FOLIC ACID 1 MG/1
1 TABLET ORAL
Refills: 0 | Status: DISCONTINUED | COMMUNITY
End: 2020-10-16

## 2020-10-16 RX ORDER — MUPIROCIN 20 MG/G
2 OINTMENT TOPICAL TWICE DAILY
Qty: 1 | Refills: 1 | Status: DISCONTINUED | COMMUNITY
Start: 2020-05-18 | End: 2020-10-16

## 2020-10-16 RX ORDER — PREDNISONE 20 MG/1
20 TABLET ORAL DAILY
Qty: 120 | Refills: 0 | Status: DISCONTINUED | COMMUNITY
Start: 2020-05-19 | End: 2020-10-16

## 2020-10-16 RX ORDER — ASCORBIC ACID 500 MG
500 TABLET ORAL
Qty: 30 | Refills: 0 | Status: DISCONTINUED | COMMUNITY
Start: 2020-05-22 | End: 2020-10-16

## 2020-10-16 RX ORDER — FAMOTIDINE 20 MG/1
20 TABLET, FILM COATED ORAL
Qty: 60 | Refills: 2 | Status: DISCONTINUED | COMMUNITY
Start: 2020-09-10 | End: 2020-10-16

## 2020-10-16 NOTE — REVIEW OF SYSTEMS
[Negative] : Allergic/Immunologic [Recent Change In Weight] : ~T recent weight change [Fever] : no fever [Chills] : no chills [Night Sweats] : no night sweats [Fatigue] : no fatigue [Vomiting] : no vomiting [Constipation] : no constipation [Diarrhea] : no diarrhea [FreeTextEntry2] : +3 Ibs [FreeTextEntry4] : allergies/sinusitis [FreeTextEntry7] : slight epigastric discomfort

## 2020-10-16 NOTE — HISTORY OF PRESENT ILLNESS
[de-identified] : ITP\par \par This is a 20 year old female with a history of eczema/asthma who is here for an evaluation of thrombocytopenia.  She was admitted to [facility] for 3 days at the end of April for ITP.  She noticed petechiae on her chest wall, leg, mouth bleeding, longer menstrual cycle (9 days instead of 4- not more heavy).  She was found to have low plt, unknown level.  She was given a platelet transfusion followed by IVIG.  She was seen by hematology, unknown who.  She was not started on any steroids.  She was discharged, called her PCP, Dr. Pollack the following day for recurrent oral bleeding.  She was seen at [facility], plt at that time was 181,000 on 4/27.  She was seen by Dr. Pollack yesterday, plt were found to be 32, repeated later 26.  During the hospitalization, she was told she had a RLL pneumonia and was treated with cefdinir and azithromycin.   [de-identified] : Patient presents for follow up appointment. In the interim patient received IVIG (9/12/20), and 40mg dex x 4 days for her plt count of 23 during last office visit. She has since continued a maintenance dose of 40mg prednisone. Patient complains of stomach pain at times when taking the prednisone, instructed to take famotidine 30min prior and to eat food, then take the prednisone. + seasonal allergies/sinusitis. Patient denies  any bleeding, easy bruising, melena, BRBPR, visual changes, bone pain, fever, chills, night sweats, back pain, headache, abdominal pain, nausea, vomiting, diarrhea, chest pain, shortness of breath, peripheral edema, or peripheral neuropathy. Good appetite, increase in weight (+4 Ibs).

## 2020-10-16 NOTE — ASSESSMENT
[FreeTextEntry1] : This is a 20 year old female with ITP.  Initially diagnosed at [facility] in the end of April- no records.  She was treated with cefdinir/zithromax for a RLL pneumonia as well as IVIG/plt transfusion. \par No steroids given per patient. \par Relapsed- given IVIG 75 gm x 2 days, with prednisone 1 mg/kg with response. With slow taper of prednisone her plt have remained normal but now relapse of ITP off steroids. \par IVIG 60gm x 2 days (9/11 & 9/12/20) given with pulse decadron 40 mg x 4 days.\par  \par Plts 299 on 40mg prednisone daily. Decrease to 30mg prednisone daily.\par Plan for prednisone taper by 10 mg every 2 weeks depending on counts. Will need to be tapered to and maintained on 10mg daily, due to relapse when off of steroids.\par +3 Ibs and WBC 11.52 in the setting of steroids.\par CBC in 2 weeks\par \par Follow up in 1 month\par Case and management discussed with Dr. Urrutia

## 2020-10-28 ENCOUNTER — APPOINTMENT (OUTPATIENT)
Dept: HEMATOLOGY ONCOLOGY | Facility: CLINIC | Age: 20
End: 2020-10-28

## 2020-10-28 ENCOUNTER — RESULT REVIEW (OUTPATIENT)
Age: 20
End: 2020-10-28

## 2020-10-28 LAB
BASOPHILS # BLD AUTO: 0.04 K/UL — SIGNIFICANT CHANGE UP (ref 0–0.2)
BASOPHILS NFR BLD AUTO: 0.4 % — SIGNIFICANT CHANGE UP (ref 0–2)
EOSINOPHIL # BLD AUTO: 0.08 K/UL — SIGNIFICANT CHANGE UP (ref 0–0.5)
EOSINOPHIL NFR BLD AUTO: 0.8 % — SIGNIFICANT CHANGE UP (ref 0–6)
HCT VFR BLD CALC: 38.9 % — SIGNIFICANT CHANGE UP (ref 34.5–45)
HGB BLD-MCNC: 13.2 G/DL — SIGNIFICANT CHANGE UP (ref 11.5–15.5)
IMM GRANULOCYTES NFR BLD AUTO: 0.5 % — SIGNIFICANT CHANGE UP (ref 0–1.5)
LYMPHOCYTES # BLD AUTO: 3.31 K/UL — HIGH (ref 1–3.3)
LYMPHOCYTES # BLD AUTO: 32.9 % — SIGNIFICANT CHANGE UP (ref 13–44)
MCHC RBC-ENTMCNC: 28.2 PG — SIGNIFICANT CHANGE UP (ref 27–34)
MCHC RBC-ENTMCNC: 33.9 G/DL — SIGNIFICANT CHANGE UP (ref 32–36)
MCV RBC AUTO: 83.1 FL — SIGNIFICANT CHANGE UP (ref 80–100)
MONOCYTES # BLD AUTO: 0.84 K/UL — SIGNIFICANT CHANGE UP (ref 0–0.9)
MONOCYTES NFR BLD AUTO: 8.3 % — SIGNIFICANT CHANGE UP (ref 2–14)
NEUTROPHILS # BLD AUTO: 5.75 K/UL — SIGNIFICANT CHANGE UP (ref 1.8–7.4)
NEUTROPHILS NFR BLD AUTO: 57.1 % — SIGNIFICANT CHANGE UP (ref 43–77)
NRBC # BLD: 0 /100 WBCS — SIGNIFICANT CHANGE UP (ref 0–0)
PLATELET # BLD AUTO: 392 K/UL — SIGNIFICANT CHANGE UP (ref 150–400)
RBC # BLD: 4.68 M/UL — SIGNIFICANT CHANGE UP (ref 3.8–5.2)
RBC # FLD: 13.7 % — SIGNIFICANT CHANGE UP (ref 10.3–14.5)
WBC # BLD: 10.07 K/UL — SIGNIFICANT CHANGE UP (ref 3.8–10.5)
WBC # FLD AUTO: 10.07 K/UL — SIGNIFICANT CHANGE UP (ref 3.8–10.5)

## 2020-11-11 ENCOUNTER — RESULT REVIEW (OUTPATIENT)
Age: 20
End: 2020-11-11

## 2020-11-11 ENCOUNTER — APPOINTMENT (OUTPATIENT)
Dept: HEMATOLOGY ONCOLOGY | Facility: CLINIC | Age: 20
End: 2020-11-11

## 2020-11-11 LAB
BASOPHILS # BLD AUTO: 0.05 K/UL — SIGNIFICANT CHANGE UP (ref 0–0.2)
BASOPHILS NFR BLD AUTO: 0.6 % — SIGNIFICANT CHANGE UP (ref 0–2)
EOSINOPHIL # BLD AUTO: 0.11 K/UL — SIGNIFICANT CHANGE UP (ref 0–0.5)
EOSINOPHIL NFR BLD AUTO: 1.3 % — SIGNIFICANT CHANGE UP (ref 0–6)
HCT VFR BLD CALC: 38.7 % — SIGNIFICANT CHANGE UP (ref 34.5–45)
HGB BLD-MCNC: 12.5 G/DL — SIGNIFICANT CHANGE UP (ref 11.5–15.5)
IMM GRANULOCYTES NFR BLD AUTO: 0.4 % — SIGNIFICANT CHANGE UP (ref 0–1.5)
LYMPHOCYTES # BLD AUTO: 3.75 K/UL — HIGH (ref 1–3.3)
LYMPHOCYTES # BLD AUTO: 46 % — HIGH (ref 13–44)
MCHC RBC-ENTMCNC: 28 PG — SIGNIFICANT CHANGE UP (ref 27–34)
MCHC RBC-ENTMCNC: 32.3 G/DL — SIGNIFICANT CHANGE UP (ref 32–36)
MCV RBC AUTO: 86.8 FL — SIGNIFICANT CHANGE UP (ref 80–100)
MONOCYTES # BLD AUTO: 0.76 K/UL — SIGNIFICANT CHANGE UP (ref 0–0.9)
MONOCYTES NFR BLD AUTO: 9.3 % — SIGNIFICANT CHANGE UP (ref 2–14)
NEUTROPHILS # BLD AUTO: 3.46 K/UL — SIGNIFICANT CHANGE UP (ref 1.8–7.4)
NEUTROPHILS NFR BLD AUTO: 42.4 % — LOW (ref 43–77)
NRBC # BLD: 0 /100 WBCS — SIGNIFICANT CHANGE UP (ref 0–0)
PLATELET # BLD AUTO: 299 K/UL — SIGNIFICANT CHANGE UP (ref 150–400)
RBC # BLD: 4.46 M/UL — SIGNIFICANT CHANGE UP (ref 3.8–5.2)
RBC # FLD: 13.6 % — SIGNIFICANT CHANGE UP (ref 10.3–14.5)
WBC # BLD: 8.16 K/UL — SIGNIFICANT CHANGE UP (ref 3.8–10.5)
WBC # FLD AUTO: 8.16 K/UL — SIGNIFICANT CHANGE UP (ref 3.8–10.5)

## 2020-11-12 ENCOUNTER — OUTPATIENT (OUTPATIENT)
Dept: OUTPATIENT SERVICES | Facility: HOSPITAL | Age: 20
LOS: 1 days | Discharge: HOME | End: 2020-11-12

## 2020-11-12 DIAGNOSIS — D69.3 IMMUNE THROMBOCYTOPENIC PURPURA: ICD-10-CM

## 2020-11-18 ENCOUNTER — APPOINTMENT (OUTPATIENT)
Dept: HEMATOLOGY ONCOLOGY | Facility: CLINIC | Age: 20
End: 2020-11-18
Payer: COMMERCIAL

## 2020-11-18 ENCOUNTER — RESULT REVIEW (OUTPATIENT)
Age: 20
End: 2020-11-18

## 2020-11-18 VITALS
RESPIRATION RATE: 17 BRPM | OXYGEN SATURATION: 100 % | SYSTOLIC BLOOD PRESSURE: 123 MMHG | HEART RATE: 110 BPM | HEIGHT: 62.99 IN | WEIGHT: 233.69 LBS | TEMPERATURE: 97.2 F | DIASTOLIC BLOOD PRESSURE: 76 MMHG

## 2020-11-18 LAB
BASOPHILS # BLD AUTO: 0.04 K/UL — SIGNIFICANT CHANGE UP (ref 0–0.2)
BASOPHILS NFR BLD AUTO: 0.4 % — SIGNIFICANT CHANGE UP (ref 0–2)
EOSINOPHIL # BLD AUTO: 0.14 K/UL — SIGNIFICANT CHANGE UP (ref 0–0.5)
EOSINOPHIL NFR BLD AUTO: 1.4 % — SIGNIFICANT CHANGE UP (ref 0–6)
HCT VFR BLD CALC: 39.8 % — SIGNIFICANT CHANGE UP (ref 34.5–45)
HGB BLD-MCNC: 13 G/DL — SIGNIFICANT CHANGE UP (ref 11.5–15.5)
IMM GRANULOCYTES NFR BLD AUTO: 0.5 % — SIGNIFICANT CHANGE UP (ref 0–1.5)
LYMPHOCYTES # BLD AUTO: 3.99 K/UL — HIGH (ref 1–3.3)
LYMPHOCYTES # BLD AUTO: 39.9 % — SIGNIFICANT CHANGE UP (ref 13–44)
MCHC RBC-ENTMCNC: 28.1 PG — SIGNIFICANT CHANGE UP (ref 27–34)
MCHC RBC-ENTMCNC: 32.7 G/DL — SIGNIFICANT CHANGE UP (ref 32–36)
MCV RBC AUTO: 86 FL — SIGNIFICANT CHANGE UP (ref 80–100)
MONOCYTES # BLD AUTO: 0.92 K/UL — HIGH (ref 0–0.9)
MONOCYTES NFR BLD AUTO: 9.2 % — SIGNIFICANT CHANGE UP (ref 2–14)
NEUTROPHILS # BLD AUTO: 4.85 K/UL — SIGNIFICANT CHANGE UP (ref 1.8–7.4)
NEUTROPHILS NFR BLD AUTO: 48.6 % — SIGNIFICANT CHANGE UP (ref 43–77)
NRBC # BLD: 0 /100 WBCS — SIGNIFICANT CHANGE UP (ref 0–0)
PLATELET # BLD AUTO: 302 K/UL — SIGNIFICANT CHANGE UP (ref 150–400)
RBC # BLD: 4.63 M/UL — SIGNIFICANT CHANGE UP (ref 3.8–5.2)
RBC # FLD: 13.4 % — SIGNIFICANT CHANGE UP (ref 10.3–14.5)
WBC # BLD: 9.99 K/UL — SIGNIFICANT CHANGE UP (ref 3.8–10.5)
WBC # FLD AUTO: 9.99 K/UL — SIGNIFICANT CHANGE UP (ref 3.8–10.5)

## 2020-11-18 PROCEDURE — 99214 OFFICE O/P EST MOD 30 MIN: CPT

## 2020-11-23 NOTE — ASSESSMENT
[FreeTextEntry1] : This is a 20 year old female with ITP.  Initially diagnosed at [facility] in the end of April- no records.  She was treated with cefdinir/zithromax for a RLL pneumonia as well as IVIG/plt transfusion. \par No steroids given per patient. \par Relapsed- given IVIG 75 gm x 2 days, with prednisone 1 mg/kg with response. \par With slow taper of prednisone her plt have remained normal but now relapse of ITP off steroids treated with steroids/IVIG. \par Her plt are normal today.  Decrease to 5 mg a day, hold stopping for now.  Will likely decrease further to 2.5 mg.\par Patient is aware of signs/symptoms of relapse, she will call or come if there are any new symptoms. \par Follow up in 1 month. \par \par

## 2020-11-23 NOTE — REVIEW OF SYSTEMS
[Vomiting] : no vomiting [Constipation] : no constipation [Diarrhea] : no diarrhea [Negative] : ENT [FreeTextEntry7] : slight epigastric discomfort

## 2020-11-23 NOTE — HISTORY OF PRESENT ILLNESS
[de-identified] : ITP\par \par This is a 20 year old female with a history of eczema/asthma who is here for an evaluation of thrombocytopenia.  She was admitted to [facility] for 3 days at the end of April for ITP.  She noticed petechiae on her chest wall, leg, mouth bleeding, longer menstrual cycle (9 days instead of 4- not more heavy).  She was found to have low plt, unknown level.  She was given a platelet transfusion followed by IVIG.  She was seen by hematology, unknown who.  She was not started on any steroids.  She was discharged, called her PCP, Dr. Pollack the following day for recurrent oral bleeding.  She was seen at [facility], plt at that time was 181,000 on 4/27.  She was seen by Dr. Pollack yesterday, plt were found to be 32, repeated later 26.  During the hospitalization, she was told she had a RLL pneumonia and was treated with cefdinir and azithromycin.   [de-identified] : Patient presents for follow up appointment.  She is on prednisone 10 mg a day currently.  Is feeling well, no complaints.   \par She has no bleeding, no fever/chills, no chest pain, no SOB, no abdominal c/o.

## 2020-12-02 ENCOUNTER — RESULT REVIEW (OUTPATIENT)
Age: 20
End: 2020-12-02

## 2020-12-02 ENCOUNTER — APPOINTMENT (OUTPATIENT)
Dept: HEMATOLOGY ONCOLOGY | Facility: CLINIC | Age: 20
End: 2020-12-02

## 2020-12-02 LAB
BASOPHILS # BLD AUTO: 0.04 K/UL — SIGNIFICANT CHANGE UP (ref 0–0.2)
BASOPHILS NFR BLD AUTO: 0.5 % — SIGNIFICANT CHANGE UP (ref 0–2)
EOSINOPHIL # BLD AUTO: 0.1 K/UL — SIGNIFICANT CHANGE UP (ref 0–0.5)
EOSINOPHIL NFR BLD AUTO: 1.2 % — SIGNIFICANT CHANGE UP (ref 0–6)
HCT VFR BLD CALC: 39.5 % — SIGNIFICANT CHANGE UP (ref 34.5–45)
HGB BLD-MCNC: 13.3 G/DL — SIGNIFICANT CHANGE UP (ref 11.5–15.5)
IMM GRANULOCYTES NFR BLD AUTO: 0.4 % — SIGNIFICANT CHANGE UP (ref 0–1.5)
LYMPHOCYTES # BLD AUTO: 2.52 K/UL — SIGNIFICANT CHANGE UP (ref 1–3.3)
LYMPHOCYTES # BLD AUTO: 30.4 % — SIGNIFICANT CHANGE UP (ref 13–44)
MCHC RBC-ENTMCNC: 28 PG — SIGNIFICANT CHANGE UP (ref 27–34)
MCHC RBC-ENTMCNC: 33.7 G/DL — SIGNIFICANT CHANGE UP (ref 32–36)
MCV RBC AUTO: 83.2 FL — SIGNIFICANT CHANGE UP (ref 80–100)
MONOCYTES # BLD AUTO: 0.69 K/UL — SIGNIFICANT CHANGE UP (ref 0–0.9)
MONOCYTES NFR BLD AUTO: 8.3 % — SIGNIFICANT CHANGE UP (ref 2–14)
NEUTROPHILS # BLD AUTO: 4.91 K/UL — SIGNIFICANT CHANGE UP (ref 1.8–7.4)
NEUTROPHILS NFR BLD AUTO: 59.2 % — SIGNIFICANT CHANGE UP (ref 43–77)
NRBC # BLD: 0 /100 WBCS — SIGNIFICANT CHANGE UP (ref 0–0)
PLATELET # BLD AUTO: 329 K/UL — SIGNIFICANT CHANGE UP (ref 150–400)
RBC # BLD: 4.75 M/UL — SIGNIFICANT CHANGE UP (ref 3.8–5.2)
RBC # FLD: 13.3 % — SIGNIFICANT CHANGE UP (ref 10.3–14.5)
WBC # BLD: 8.29 K/UL — SIGNIFICANT CHANGE UP (ref 3.8–10.5)
WBC # FLD AUTO: 8.29 K/UL — SIGNIFICANT CHANGE UP (ref 3.8–10.5)

## 2020-12-11 ENCOUNTER — OUTPATIENT (OUTPATIENT)
Dept: OUTPATIENT SERVICES | Facility: HOSPITAL | Age: 20
LOS: 1 days | Discharge: HOME | End: 2020-12-11

## 2020-12-11 DIAGNOSIS — D69.3 IMMUNE THROMBOCYTOPENIC PURPURA: ICD-10-CM

## 2020-12-16 ENCOUNTER — APPOINTMENT (OUTPATIENT)
Dept: HEMATOLOGY ONCOLOGY | Facility: CLINIC | Age: 20
End: 2020-12-16

## 2020-12-28 ENCOUNTER — RESULT REVIEW (OUTPATIENT)
Age: 20
End: 2020-12-28

## 2020-12-28 ENCOUNTER — APPOINTMENT (OUTPATIENT)
Dept: HEMATOLOGY ONCOLOGY | Facility: CLINIC | Age: 20
End: 2020-12-28
Payer: COMMERCIAL

## 2020-12-28 VITALS
HEART RATE: 90 BPM | DIASTOLIC BLOOD PRESSURE: 83 MMHG | HEIGHT: 62.99 IN | SYSTOLIC BLOOD PRESSURE: 132 MMHG | WEIGHT: 228.4 LBS | RESPIRATION RATE: 16 BRPM | BODY MASS INDEX: 40.47 KG/M2 | TEMPERATURE: 97.3 F | OXYGEN SATURATION: 94 %

## 2020-12-28 LAB
BASOPHILS # BLD AUTO: 0.04 K/UL — SIGNIFICANT CHANGE UP (ref 0–0.2)
BASOPHILS NFR BLD AUTO: 0.4 % — SIGNIFICANT CHANGE UP (ref 0–2)
EOSINOPHIL # BLD AUTO: 0.08 K/UL — SIGNIFICANT CHANGE UP (ref 0–0.5)
EOSINOPHIL NFR BLD AUTO: 0.7 % — SIGNIFICANT CHANGE UP (ref 0–6)
HCT VFR BLD CALC: 38.1 % — SIGNIFICANT CHANGE UP (ref 34.5–45)
HGB BLD-MCNC: 12.9 G/DL — SIGNIFICANT CHANGE UP (ref 11.5–15.5)
IMM GRANULOCYTES NFR BLD AUTO: 0.4 % — SIGNIFICANT CHANGE UP (ref 0–1.5)
LYMPHOCYTES # BLD AUTO: 1.63 K/UL — SIGNIFICANT CHANGE UP (ref 1–3.3)
LYMPHOCYTES # BLD AUTO: 14.3 % — SIGNIFICANT CHANGE UP (ref 13–44)
MCHC RBC-ENTMCNC: 28 PG — SIGNIFICANT CHANGE UP (ref 27–34)
MCHC RBC-ENTMCNC: 33.9 G/DL — SIGNIFICANT CHANGE UP (ref 32–36)
MCV RBC AUTO: 82.6 FL — SIGNIFICANT CHANGE UP (ref 80–100)
MONOCYTES # BLD AUTO: 0.94 K/UL — HIGH (ref 0–0.9)
MONOCYTES NFR BLD AUTO: 8.2 % — SIGNIFICANT CHANGE UP (ref 2–14)
NEUTROPHILS # BLD AUTO: 8.67 K/UL — HIGH (ref 1.8–7.4)
NEUTROPHILS NFR BLD AUTO: 76 % — SIGNIFICANT CHANGE UP (ref 43–77)
NRBC # BLD: 0 /100 WBCS — SIGNIFICANT CHANGE UP (ref 0–0)
PLATELET # BLD AUTO: 48 K/UL — LOW (ref 150–400)
RBC # BLD: 4.61 M/UL — SIGNIFICANT CHANGE UP (ref 3.8–5.2)
RBC # FLD: 13.1 % — SIGNIFICANT CHANGE UP (ref 10.3–14.5)
WBC # BLD: 11.41 K/UL — HIGH (ref 3.8–10.5)
WBC # FLD AUTO: 11.41 K/UL — HIGH (ref 3.8–10.5)

## 2020-12-28 PROCEDURE — 99072 ADDL SUPL MATRL&STAF TM PHE: CPT

## 2020-12-28 PROCEDURE — 99214 OFFICE O/P EST MOD 30 MIN: CPT

## 2020-12-28 NOTE — HISTORY OF PRESENT ILLNESS
[de-identified] : ITP\par \par This is a 20 year old female with a history of eczema/asthma who is here for an evaluation of thrombocytopenia.  She was admitted to [facility] for 3 days at the end of April for ITP.  She noticed petechiae on her chest wall, leg, mouth bleeding, longer menstrual cycle (9 days instead of 4- not more heavy).  She was found to have low plt, unknown level.  She was given a platelet transfusion followed by IVIG.  She was seen by hematology, unknown who.  She was not started on any steroids.  She was discharged, called her PCP, Dr. Pollack the following day for recurrent oral bleeding.  She was seen at [facility], plt at that time was 181,000 on 4/27.  She was seen by Dr. Pollack yesterday, plt were found to be 32, repeated later 26.  During the hospitalization, she was told she had a RLL pneumonia and was treated with cefdinir and azithromycin.   [de-identified] : Patient presents for follow up appointment. Overall, she feels well and is currently on prednisone 5mg daily. Patient denies easy bruising, melena, BRBPR, fever, chills, night sweats, back pain, abdominal pain, nausea, vomiting, diarrhea, chest pain, shortness of breath, or peripheral edema. Good appetite, stable weight. LMP: started yesterday, heavier than usual. \par

## 2020-12-28 NOTE — ASSESSMENT
[FreeTextEntry1] : This is a 20 year old female with ITP.  Initially diagnosed at [facility] in the end of April- no records.  She was treated with cefdinir/zithromax for a RLL pneumonia as well as IVIG/plt transfusion. \par No steroids given per patient. \par \par Relapsed- given IVIG 75 gm x 2 days, with prednisone 1 mg/kg with response. \par Her plts are 48 today\par Relapsed again (12/28/20) while on 5mg prednisone daily. Scheduled for IVIG 60gm x 2 for 12/29 and 12/30/20\par Dex 40mg x 4 days, then switch to prednisone 20mg daily with count checks every 2 weeks.\par Patient is aware of signs/symptoms of relapse, she will call or come if there are any new symptoms. \par \par Follow up in 1 month. \par Case and management discussed with Dr. Urrutia [place] [employer]

## 2020-12-29 ENCOUNTER — APPOINTMENT (OUTPATIENT)
Dept: INFUSION THERAPY | Facility: HOSPITAL | Age: 20
End: 2020-12-29

## 2020-12-30 ENCOUNTER — RESULT REVIEW (OUTPATIENT)
Age: 20
End: 2020-12-30

## 2020-12-30 ENCOUNTER — APPOINTMENT (OUTPATIENT)
Dept: INFUSION THERAPY | Facility: HOSPITAL | Age: 20
End: 2020-12-30

## 2020-12-30 ENCOUNTER — APPOINTMENT (OUTPATIENT)
Dept: HEMATOLOGY ONCOLOGY | Facility: CLINIC | Age: 20
End: 2020-12-30

## 2020-12-30 DIAGNOSIS — R11.2 NAUSEA WITH VOMITING, UNSPECIFIED: ICD-10-CM

## 2020-12-30 LAB
BASOPHILS # BLD AUTO: 0.02 K/UL — SIGNIFICANT CHANGE UP (ref 0–0.2)
BASOPHILS NFR BLD AUTO: 0.2 % — SIGNIFICANT CHANGE UP (ref 0–2)
EOSINOPHIL # BLD AUTO: 0.01 K/UL — SIGNIFICANT CHANGE UP (ref 0–0.5)
EOSINOPHIL NFR BLD AUTO: 0.1 % — SIGNIFICANT CHANGE UP (ref 0–6)
HCT VFR BLD CALC: 35.7 % — SIGNIFICANT CHANGE UP (ref 34.5–45)
HGB BLD-MCNC: 12.3 G/DL — SIGNIFICANT CHANGE UP (ref 11.5–15.5)
IMM GRANULOCYTES NFR BLD AUTO: 0.7 % — SIGNIFICANT CHANGE UP (ref 0–1.5)
LYMPHOCYTES # BLD AUTO: 1.64 K/UL — SIGNIFICANT CHANGE UP (ref 1–3.3)
LYMPHOCYTES # BLD AUTO: 14.1 % — SIGNIFICANT CHANGE UP (ref 13–44)
MCHC RBC-ENTMCNC: 28.1 PG — SIGNIFICANT CHANGE UP (ref 27–34)
MCHC RBC-ENTMCNC: 34.5 G/DL — SIGNIFICANT CHANGE UP (ref 32–36)
MCV RBC AUTO: 81.7 FL — SIGNIFICANT CHANGE UP (ref 80–100)
MONOCYTES # BLD AUTO: 1.17 K/UL — HIGH (ref 0–0.9)
MONOCYTES NFR BLD AUTO: 10.1 % — SIGNIFICANT CHANGE UP (ref 2–14)
NEUTROPHILS # BLD AUTO: 8.71 K/UL — HIGH (ref 1.8–7.4)
NEUTROPHILS NFR BLD AUTO: 74.8 % — SIGNIFICANT CHANGE UP (ref 43–77)
NRBC # BLD: 0 /100 WBCS — SIGNIFICANT CHANGE UP (ref 0–0)
PLATELET # BLD AUTO: 95 K/UL — LOW (ref 150–400)
RBC # BLD: 4.37 M/UL — SIGNIFICANT CHANGE UP (ref 3.8–5.2)
RBC # FLD: 12.8 % — SIGNIFICANT CHANGE UP (ref 10.3–14.5)
WBC # BLD: 11.63 K/UL — HIGH (ref 3.8–10.5)
WBC # FLD AUTO: 11.63 K/UL — HIGH (ref 3.8–10.5)

## 2021-01-05 ENCOUNTER — RESULT REVIEW (OUTPATIENT)
Age: 21
End: 2021-01-05

## 2021-01-05 ENCOUNTER — APPOINTMENT (OUTPATIENT)
Dept: HEMATOLOGY ONCOLOGY | Facility: CLINIC | Age: 21
End: 2021-01-05

## 2021-01-05 LAB
BASOPHILS # BLD AUTO: 0 K/UL — SIGNIFICANT CHANGE UP (ref 0–0.2)
BASOPHILS NFR BLD AUTO: 0 % — SIGNIFICANT CHANGE UP (ref 0–2)
EOSINOPHIL # BLD AUTO: 0 K/UL — SIGNIFICANT CHANGE UP (ref 0–0.5)
EOSINOPHIL NFR BLD AUTO: 0 % — SIGNIFICANT CHANGE UP (ref 0–6)
HCT VFR BLD CALC: 40.6 % — SIGNIFICANT CHANGE UP (ref 34.5–45)
HGB BLD-MCNC: 13.3 G/DL — SIGNIFICANT CHANGE UP (ref 11.5–15.5)
LYMPHOCYTES # BLD AUTO: 28 % — SIGNIFICANT CHANGE UP (ref 13–44)
LYMPHOCYTES # BLD AUTO: 3.77 K/UL — HIGH (ref 1–3.3)
MCHC RBC-ENTMCNC: 27.8 PG — SIGNIFICANT CHANGE UP (ref 27–34)
MCHC RBC-ENTMCNC: 32.8 G/DL — SIGNIFICANT CHANGE UP (ref 32–36)
MCV RBC AUTO: 84.9 FL — SIGNIFICANT CHANGE UP (ref 80–100)
MONOCYTES # BLD AUTO: 0.94 K/UL — HIGH (ref 0–0.9)
MONOCYTES NFR BLD AUTO: 7 % — SIGNIFICANT CHANGE UP (ref 2–14)
NEUTROPHILS # BLD AUTO: 8.76 K/UL — HIGH (ref 1.8–7.4)
NEUTROPHILS NFR BLD AUTO: 64 % — SIGNIFICANT CHANGE UP (ref 43–77)
NEUTS BAND # BLD: 1 % — SIGNIFICANT CHANGE UP (ref 0–8)
NRBC # BLD: 0 /100 — SIGNIFICANT CHANGE UP (ref 0–0)
NRBC # BLD: SIGNIFICANT CHANGE UP /100 WBCS (ref 0–0)
PLAT MORPH BLD: NORMAL — SIGNIFICANT CHANGE UP
PLATELET # BLD AUTO: 532 K/UL — HIGH (ref 150–400)
RBC # BLD: 4.78 M/UL — SIGNIFICANT CHANGE UP (ref 3.8–5.2)
RBC # FLD: 13 % — SIGNIFICANT CHANGE UP (ref 10.3–14.5)
RBC BLD AUTO: SIGNIFICANT CHANGE UP
WBC # BLD: 13.48 K/UL — HIGH (ref 3.8–10.5)
WBC # FLD AUTO: 13.48 K/UL — HIGH (ref 3.8–10.5)

## 2021-01-06 ENCOUNTER — OUTPATIENT (OUTPATIENT)
Dept: OUTPATIENT SERVICES | Facility: HOSPITAL | Age: 21
LOS: 1 days | Discharge: HOME | End: 2021-01-06

## 2021-01-06 DIAGNOSIS — D69.3 IMMUNE THROMBOCYTOPENIC PURPURA: ICD-10-CM

## 2021-01-11 ENCOUNTER — RESULT REVIEW (OUTPATIENT)
Age: 21
End: 2021-01-11

## 2021-01-11 ENCOUNTER — APPOINTMENT (OUTPATIENT)
Dept: HEMATOLOGY ONCOLOGY | Facility: CLINIC | Age: 21
End: 2021-01-11

## 2021-01-11 LAB
BASOPHILS # BLD AUTO: 0.03 K/UL — SIGNIFICANT CHANGE UP (ref 0–0.2)
BASOPHILS NFR BLD AUTO: 0.2 % — SIGNIFICANT CHANGE UP (ref 0–2)
EOSINOPHIL # BLD AUTO: 0 K/UL — SIGNIFICANT CHANGE UP (ref 0–0.5)
EOSINOPHIL NFR BLD AUTO: 0 % — SIGNIFICANT CHANGE UP (ref 0–6)
HCT VFR BLD CALC: 42.4 % — SIGNIFICANT CHANGE UP (ref 34.5–45)
HGB BLD-MCNC: 13.9 G/DL — SIGNIFICANT CHANGE UP (ref 11.5–15.5)
IMM GRANULOCYTES NFR BLD AUTO: 0.6 % — SIGNIFICANT CHANGE UP (ref 0–1.5)
LYMPHOCYTES # BLD AUTO: 16.5 % — SIGNIFICANT CHANGE UP (ref 13–44)
LYMPHOCYTES # BLD AUTO: 2.08 K/UL — SIGNIFICANT CHANGE UP (ref 1–3.3)
MCHC RBC-ENTMCNC: 28 PG — SIGNIFICANT CHANGE UP (ref 27–34)
MCHC RBC-ENTMCNC: 32.8 G/DL — SIGNIFICANT CHANGE UP (ref 32–36)
MCV RBC AUTO: 85.3 FL — SIGNIFICANT CHANGE UP (ref 80–100)
MONOCYTES # BLD AUTO: 0.59 K/UL — SIGNIFICANT CHANGE UP (ref 0–0.9)
MONOCYTES NFR BLD AUTO: 4.7 % — SIGNIFICANT CHANGE UP (ref 2–14)
NEUTROPHILS # BLD AUTO: 9.85 K/UL — HIGH (ref 1.8–7.4)
NEUTROPHILS NFR BLD AUTO: 78 % — HIGH (ref 43–77)
NRBC # BLD: 0 /100 WBCS — SIGNIFICANT CHANGE UP (ref 0–0)
PLATELET # BLD AUTO: 347 K/UL — SIGNIFICANT CHANGE UP (ref 150–400)
RBC # BLD: 4.97 M/UL — SIGNIFICANT CHANGE UP (ref 3.8–5.2)
RBC # FLD: 13.1 % — SIGNIFICANT CHANGE UP (ref 10.3–14.5)
WBC # BLD: 12.62 K/UL — HIGH (ref 3.8–10.5)
WBC # FLD AUTO: 12.62 K/UL — HIGH (ref 3.8–10.5)

## 2021-01-25 ENCOUNTER — RESULT REVIEW (OUTPATIENT)
Age: 21
End: 2021-01-25

## 2021-01-25 ENCOUNTER — APPOINTMENT (OUTPATIENT)
Dept: HEMATOLOGY ONCOLOGY | Facility: CLINIC | Age: 21
End: 2021-01-25
Payer: COMMERCIAL

## 2021-01-25 VITALS
WEIGHT: 234.57 LBS | TEMPERATURE: 97.5 F | HEART RATE: 89 BPM | HEIGHT: 62.2 IN | DIASTOLIC BLOOD PRESSURE: 84 MMHG | RESPIRATION RATE: 18 BRPM | OXYGEN SATURATION: 99 % | BODY MASS INDEX: 42.62 KG/M2 | SYSTOLIC BLOOD PRESSURE: 126 MMHG

## 2021-01-25 LAB
BASOPHILS # BLD AUTO: 0 K/UL — SIGNIFICANT CHANGE UP (ref 0–0.2)
BASOPHILS NFR BLD AUTO: 0 % — SIGNIFICANT CHANGE UP (ref 0–2)
EOSINOPHIL # BLD AUTO: 0 K/UL — SIGNIFICANT CHANGE UP (ref 0–0.5)
EOSINOPHIL NFR BLD AUTO: 0 % — SIGNIFICANT CHANGE UP (ref 0–6)
HCT VFR BLD CALC: 38.3 % — SIGNIFICANT CHANGE UP (ref 34.5–45)
HGB BLD-MCNC: 12.9 G/DL — SIGNIFICANT CHANGE UP (ref 11.5–15.5)
LYMPHOCYTES # BLD AUTO: 1.58 K/UL — SIGNIFICANT CHANGE UP (ref 1–3.3)
LYMPHOCYTES # BLD AUTO: 17 % — SIGNIFICANT CHANGE UP (ref 13–44)
MCHC RBC-ENTMCNC: 27.9 PG — SIGNIFICANT CHANGE UP (ref 27–34)
MCHC RBC-ENTMCNC: 33.7 G/DL — SIGNIFICANT CHANGE UP (ref 32–36)
MCV RBC AUTO: 82.9 FL — SIGNIFICANT CHANGE UP (ref 80–100)
MONOCYTES # BLD AUTO: 0.19 K/UL — SIGNIFICANT CHANGE UP (ref 0–0.9)
MONOCYTES NFR BLD AUTO: 2 % — SIGNIFICANT CHANGE UP (ref 2–14)
NEUTROPHILS # BLD AUTO: 7.53 K/UL — HIGH (ref 1.8–7.4)
NEUTROPHILS NFR BLD AUTO: 81 % — HIGH (ref 43–77)
NRBC # BLD: 0 /100 — SIGNIFICANT CHANGE UP (ref 0–0)
NRBC # BLD: SIGNIFICANT CHANGE UP /100 WBCS (ref 0–0)
PLAT MORPH BLD: NORMAL — SIGNIFICANT CHANGE UP
PLATELET # BLD AUTO: 254 K/UL — SIGNIFICANT CHANGE UP (ref 150–400)
RBC # BLD: 4.62 M/UL — SIGNIFICANT CHANGE UP (ref 3.8–5.2)
RBC # FLD: 13.2 % — SIGNIFICANT CHANGE UP (ref 10.3–14.5)
RBC BLD AUTO: SIGNIFICANT CHANGE UP
WBC # BLD: 9.3 K/UL — SIGNIFICANT CHANGE UP (ref 3.8–10.5)
WBC # FLD AUTO: 9.3 K/UL — SIGNIFICANT CHANGE UP (ref 3.8–10.5)

## 2021-01-25 PROCEDURE — 99214 OFFICE O/P EST MOD 30 MIN: CPT

## 2021-01-25 PROCEDURE — 99072 ADDL SUPL MATRL&STAF TM PHE: CPT

## 2021-01-25 NOTE — REVIEW OF SYSTEMS
[Negative] : Allergic/Immunologic [Fever] : no fever [Chills] : no chills [Night Sweats] : no night sweats [Fatigue] : no fatigue [Recent Change In Weight] : ~T recent weight change [FreeTextEntry2] : +6 Ibs

## 2021-01-25 NOTE — HISTORY OF PRESENT ILLNESS
[de-identified] : ITP\par \par This is a 20 year old female with a history of eczema/asthma who is here for an evaluation of thrombocytopenia.  She was admitted to [facility] for 3 days at the end of April for ITP.  She noticed petechiae on her chest wall, leg, mouth bleeding, longer menstrual cycle (9 days instead of 4- not more heavy).  She was found to have low plt, unknown level.  She was given a platelet transfusion followed by IVIG.  She was seen by hematology, unknown who.  She was not started on any steroids.  She was discharged, called her PCP, Dr. Pollack the following day for recurrent oral bleeding.  She was seen at [facility], plt at that time was 181,000 on 4/27.  She was seen by Dr. Pollack yesterday, plt were found to be 32, repeated later 26.  During the hospitalization, she was told she had a RLL pneumonia and was treated with cefdinir and azithromycin.   [de-identified] : Patient presents for follow up appointment. In the interim, she was found to have relapsed with plts of 48, received IVIG 60gm x 2 doses, started on Dex 40mg x 4 days, then switched to prednisone 20mg daily. Overall, she feels well and is currently on prednisone 20mg daily. Patient denies easy bruising, melena, BRBPR, fever, chills, night sweats, back pain, abdominal pain, nausea, vomiting, diarrhea, chest pain, shortness of breath, or peripheral edema. Good appetite, weight gain (+6Ibs) attributed to steroid use. \par

## 2021-01-25 NOTE — ASSESSMENT
[FreeTextEntry1] : This is a 20 year old female with ITP.  Initially diagnosed at [facility] in the end of April- no records.  She was treated with cefdinir/zithromax for a RLL pneumonia as well as IVIG/plt transfusion. \par No steroids given per patient. \par \par Her plts are 254 today\par Relapsed (12/28/20) while on Prednisone 5mg daily.\par Taper prednisone to 15mg daily with count check in 2 weeks, then taper to 10mg if counts allow it. \par Consider Rituxan, if there is another relapse in the future. Information pamphlet provided to the patient regarding the risk, and side effect profile of Rituxan.\par Patient is aware of signs/symptoms of relapse, she will call or come if there are any new symptoms. \par \par Follow up in 1 month. \par Case and management discussed with Dr. Urrutia

## 2021-02-02 ENCOUNTER — OUTPATIENT (OUTPATIENT)
Dept: OUTPATIENT SERVICES | Facility: HOSPITAL | Age: 21
LOS: 1 days | Discharge: HOME | End: 2021-02-02

## 2021-02-02 DIAGNOSIS — D69.3 IMMUNE THROMBOCYTOPENIC PURPURA: ICD-10-CM

## 2021-02-08 ENCOUNTER — APPOINTMENT (OUTPATIENT)
Dept: HEMATOLOGY ONCOLOGY | Facility: CLINIC | Age: 21
End: 2021-02-08

## 2021-02-20 NOTE — ED ADULT NURSE NOTE - CHIEF COMPLAINT QUOTE
low platelets with bleeding gums, headache - pt was in [facility]- discharged yesterday  tested neg for covid at [facility], [facility]

## 2021-02-25 ENCOUNTER — RESULT REVIEW (OUTPATIENT)
Age: 21
End: 2021-02-25

## 2021-02-25 ENCOUNTER — APPOINTMENT (OUTPATIENT)
Dept: HEMATOLOGY ONCOLOGY | Facility: CLINIC | Age: 21
End: 2021-02-25
Payer: COMMERCIAL

## 2021-02-25 VITALS
SYSTOLIC BLOOD PRESSURE: 136 MMHG | HEART RATE: 92 BPM | WEIGHT: 236.99 LBS | DIASTOLIC BLOOD PRESSURE: 81 MMHG | HEIGHT: 62.2 IN | RESPIRATION RATE: 18 BRPM | TEMPERATURE: 97.6 F | OXYGEN SATURATION: 99 % | BODY MASS INDEX: 43.06 KG/M2

## 2021-02-25 LAB
BASOPHILS # BLD AUTO: 0.03 K/UL — SIGNIFICANT CHANGE UP (ref 0–0.2)
BASOPHILS NFR BLD AUTO: 0.4 % — SIGNIFICANT CHANGE UP (ref 0–2)
EOSINOPHIL # BLD AUTO: 0.11 K/UL — SIGNIFICANT CHANGE UP (ref 0–0.5)
EOSINOPHIL NFR BLD AUTO: 1.5 % — SIGNIFICANT CHANGE UP (ref 0–6)
HCT VFR BLD CALC: 38 % — SIGNIFICANT CHANGE UP (ref 34.5–45)
HGB BLD-MCNC: 13 G/DL — SIGNIFICANT CHANGE UP (ref 11.5–15.5)
IMM GRANULOCYTES NFR BLD AUTO: 0.6 % — SIGNIFICANT CHANGE UP (ref 0–1.5)
LYMPHOCYTES # BLD AUTO: 2.83 K/UL — SIGNIFICANT CHANGE UP (ref 1–3.3)
LYMPHOCYTES # BLD AUTO: 39.1 % — SIGNIFICANT CHANGE UP (ref 13–44)
MCHC RBC-ENTMCNC: 28 PG — SIGNIFICANT CHANGE UP (ref 27–34)
MCHC RBC-ENTMCNC: 34.2 G/DL — SIGNIFICANT CHANGE UP (ref 32–36)
MCV RBC AUTO: 81.9 FL — SIGNIFICANT CHANGE UP (ref 80–100)
MONOCYTES # BLD AUTO: 0.63 K/UL — SIGNIFICANT CHANGE UP (ref 0–0.9)
MONOCYTES NFR BLD AUTO: 8.7 % — SIGNIFICANT CHANGE UP (ref 2–14)
NEUTROPHILS # BLD AUTO: 3.59 K/UL — SIGNIFICANT CHANGE UP (ref 1.8–7.4)
NEUTROPHILS NFR BLD AUTO: 49.7 % — SIGNIFICANT CHANGE UP (ref 43–77)
NRBC # BLD: 0 /100 WBCS — SIGNIFICANT CHANGE UP (ref 0–0)
PLATELET # BLD AUTO: 222 K/UL — SIGNIFICANT CHANGE UP (ref 150–400)
RBC # BLD: 4.64 M/UL — SIGNIFICANT CHANGE UP (ref 3.8–5.2)
RBC # FLD: 13.3 % — SIGNIFICANT CHANGE UP (ref 10.3–14.5)
WBC # BLD: 7.23 K/UL — SIGNIFICANT CHANGE UP (ref 3.8–10.5)
WBC # FLD AUTO: 7.23 K/UL — SIGNIFICANT CHANGE UP (ref 3.8–10.5)

## 2021-02-25 PROCEDURE — 99072 ADDL SUPL MATRL&STAF TM PHE: CPT

## 2021-02-25 PROCEDURE — 99213 OFFICE O/P EST LOW 20 MIN: CPT

## 2021-03-06 ENCOUNTER — OUTPATIENT (OUTPATIENT)
Dept: OUTPATIENT SERVICES | Facility: HOSPITAL | Age: 21
LOS: 1 days | Discharge: HOME | End: 2021-03-06

## 2021-03-06 DIAGNOSIS — D69.3 IMMUNE THROMBOCYTOPENIC PURPURA: ICD-10-CM

## 2021-03-08 NOTE — ASSESSMENT
[FreeTextEntry1] : This is a 21 year old female with ITP.  Initially diagnosed at [facility] in the end of April- no records.  She was treated with cefdinir/zithromax for a RLL pneumonia as well as IVIG/plt transfusion. \par No steroids given per patient. \par \par Her plts are 222 today\par Relapsed (12/28/20) while on Prednisone 5mg daily.\par Taper prednisone to 15mg today (did not taper previously), follow up with count check in 2 weeks, then taper to 10mg if counts allow it. \par Consider Rituxan, if there is another relapse in the future.\par Patient is aware of signs/symptoms of relapse, she will call or come if there are any new symptoms. \par Follow up in 1 month. \par

## 2021-03-08 NOTE — HISTORY OF PRESENT ILLNESS
[de-identified] : ITP\par \par This is a 20 year old female with a history of eczema/asthma who is here for an evaluation of thrombocytopenia.  She was admitted to [facility] for 3 days at the end of April for ITP.  She noticed petechiae on her chest wall, leg, mouth bleeding, longer menstrual cycle (9 days instead of 4- not more heavy).  She was found to have low plt, unknown level.  She was given a platelet transfusion followed by IVIG.  She was seen by hematology, unknown who.  She was not started on any steroids.  She was discharged, called her PCP, Dr. Pollack the following day for recurrent oral bleeding.  She was seen at [facility], plt at that time was 181,000 on 4/27.  She was seen by Dr. Pollack yesterday, plt were found to be 32, repeated later 26.  During the hospitalization, she was told she had a RLL pneumonia and was treated with cefdinir and azithromycin.   [de-identified] : Patient presents for follow up appointment. She remains on prednisone 15 mg a day.  Otherwise denies any complaints, no chest pain, no SOB, no abdominal c/o, no n/v/d, no melena/BRBPR.

## 2021-03-09 ENCOUNTER — RESULT REVIEW (OUTPATIENT)
Age: 21
End: 2021-03-09

## 2021-03-09 ENCOUNTER — APPOINTMENT (OUTPATIENT)
Dept: HEMATOLOGY ONCOLOGY | Facility: CLINIC | Age: 21
End: 2021-03-09

## 2021-03-09 LAB
BASOPHILS # BLD AUTO: 0.03 K/UL — SIGNIFICANT CHANGE UP (ref 0–0.2)
BASOPHILS NFR BLD AUTO: 0.4 % — SIGNIFICANT CHANGE UP (ref 0–2)
EOSINOPHIL # BLD AUTO: 0.13 K/UL — SIGNIFICANT CHANGE UP (ref 0–0.5)
EOSINOPHIL NFR BLD AUTO: 1.7 % — SIGNIFICANT CHANGE UP (ref 0–6)
HCT VFR BLD CALC: 39.1 % — SIGNIFICANT CHANGE UP (ref 34.5–45)
HGB BLD-MCNC: 13.2 G/DL — SIGNIFICANT CHANGE UP (ref 11.5–15.5)
IMM GRANULOCYTES NFR BLD AUTO: 0.4 % — SIGNIFICANT CHANGE UP (ref 0–1.5)
LYMPHOCYTES # BLD AUTO: 2.85 K/UL — SIGNIFICANT CHANGE UP (ref 1–3.3)
LYMPHOCYTES # BLD AUTO: 37.5 % — SIGNIFICANT CHANGE UP (ref 13–44)
MCHC RBC-ENTMCNC: 27.9 PG — SIGNIFICANT CHANGE UP (ref 27–34)
MCHC RBC-ENTMCNC: 33.8 G/DL — SIGNIFICANT CHANGE UP (ref 32–36)
MCV RBC AUTO: 82.7 FL — SIGNIFICANT CHANGE UP (ref 80–100)
MONOCYTES # BLD AUTO: 0.48 K/UL — SIGNIFICANT CHANGE UP (ref 0–0.9)
MONOCYTES NFR BLD AUTO: 6.3 % — SIGNIFICANT CHANGE UP (ref 2–14)
NEUTROPHILS # BLD AUTO: 4.09 K/UL — SIGNIFICANT CHANGE UP (ref 1.8–7.4)
NEUTROPHILS NFR BLD AUTO: 53.7 % — SIGNIFICANT CHANGE UP (ref 43–77)
NRBC # BLD: 0 /100 WBCS — SIGNIFICANT CHANGE UP (ref 0–0)
PLATELET # BLD AUTO: 153 K/UL — SIGNIFICANT CHANGE UP (ref 150–400)
RBC # BLD: 4.73 M/UL — SIGNIFICANT CHANGE UP (ref 3.8–5.2)
RBC # FLD: 13.3 % — SIGNIFICANT CHANGE UP (ref 10.3–14.5)
WBC # BLD: 7.61 K/UL — SIGNIFICANT CHANGE UP (ref 3.8–10.5)
WBC # FLD AUTO: 7.61 K/UL — SIGNIFICANT CHANGE UP (ref 3.8–10.5)

## 2021-03-10 ENCOUNTER — APPOINTMENT (OUTPATIENT)
Dept: HEMATOLOGY ONCOLOGY | Facility: CLINIC | Age: 21
End: 2021-03-10

## 2021-03-11 ENCOUNTER — APPOINTMENT (OUTPATIENT)
Dept: HEMATOLOGY ONCOLOGY | Facility: CLINIC | Age: 21
End: 2021-03-11

## 2021-04-05 ENCOUNTER — APPOINTMENT (OUTPATIENT)
Dept: HEMATOLOGY ONCOLOGY | Facility: CLINIC | Age: 21
End: 2021-04-05
Payer: COMMERCIAL

## 2021-04-05 ENCOUNTER — RESULT REVIEW (OUTPATIENT)
Age: 21
End: 2021-04-05

## 2021-04-05 ENCOUNTER — APPOINTMENT (OUTPATIENT)
Dept: HEMATOLOGY ONCOLOGY | Facility: CLINIC | Age: 21
End: 2021-04-05

## 2021-04-05 VITALS
DIASTOLIC BLOOD PRESSURE: 87 MMHG | HEART RATE: 103 BPM | OXYGEN SATURATION: 99 % | BODY MASS INDEX: 44.02 KG/M2 | WEIGHT: 239.2 LBS | SYSTOLIC BLOOD PRESSURE: 124 MMHG | RESPIRATION RATE: 18 BRPM | TEMPERATURE: 97.7 F | HEIGHT: 62 IN

## 2021-04-05 LAB
BASOPHILS # BLD AUTO: 0.05 K/UL — SIGNIFICANT CHANGE UP (ref 0–0.2)
BASOPHILS NFR BLD AUTO: 0.6 % — SIGNIFICANT CHANGE UP (ref 0–2)
EOSINOPHIL # BLD AUTO: 0.24 K/UL — SIGNIFICANT CHANGE UP (ref 0–0.5)
EOSINOPHIL NFR BLD AUTO: 3.1 % — SIGNIFICANT CHANGE UP (ref 0–6)
HCT VFR BLD CALC: 36.5 % — SIGNIFICANT CHANGE UP (ref 34.5–45)
HGB BLD-MCNC: 12.7 G/DL — SIGNIFICANT CHANGE UP (ref 11.5–15.5)
IMM GRANULOCYTES NFR BLD AUTO: 1.4 % — SIGNIFICANT CHANGE UP (ref 0–1.5)
LYMPHOCYTES # BLD AUTO: 3.17 K/UL — SIGNIFICANT CHANGE UP (ref 1–3.3)
LYMPHOCYTES # BLD AUTO: 40.7 % — SIGNIFICANT CHANGE UP (ref 13–44)
MCHC RBC-ENTMCNC: 28.6 PG — SIGNIFICANT CHANGE UP (ref 27–34)
MCHC RBC-ENTMCNC: 34.8 G/DL — SIGNIFICANT CHANGE UP (ref 32–36)
MCV RBC AUTO: 82.2 FL — SIGNIFICANT CHANGE UP (ref 80–100)
MONOCYTES # BLD AUTO: 0.65 K/UL — SIGNIFICANT CHANGE UP (ref 0–0.9)
MONOCYTES NFR BLD AUTO: 8.3 % — SIGNIFICANT CHANGE UP (ref 2–14)
NEUTROPHILS # BLD AUTO: 3.57 K/UL — SIGNIFICANT CHANGE UP (ref 1.8–7.4)
NEUTROPHILS NFR BLD AUTO: 45.9 % — SIGNIFICANT CHANGE UP (ref 43–77)
NRBC # BLD: 0 /100 WBCS — SIGNIFICANT CHANGE UP (ref 0–0)
PLATELET # BLD AUTO: 78 K/UL — LOW (ref 150–400)
RBC # BLD: 4.44 M/UL — SIGNIFICANT CHANGE UP (ref 3.8–5.2)
RBC # FLD: 13.3 % — SIGNIFICANT CHANGE UP (ref 10.3–14.5)
WBC # BLD: 7.79 K/UL — SIGNIFICANT CHANGE UP (ref 3.8–10.5)
WBC # FLD AUTO: 7.79 K/UL — SIGNIFICANT CHANGE UP (ref 3.8–10.5)

## 2021-04-05 PROCEDURE — 99072 ADDL SUPL MATRL&STAF TM PHE: CPT

## 2021-04-05 PROCEDURE — 99214 OFFICE O/P EST MOD 30 MIN: CPT

## 2021-04-06 ENCOUNTER — OUTPATIENT (OUTPATIENT)
Dept: OUTPATIENT SERVICES | Facility: HOSPITAL | Age: 21
LOS: 1 days | Discharge: HOME | End: 2021-04-06

## 2021-04-06 DIAGNOSIS — D69.3 IMMUNE THROMBOCYTOPENIC PURPURA: ICD-10-CM

## 2021-04-07 ENCOUNTER — RESULT REVIEW (OUTPATIENT)
Age: 21
End: 2021-04-07

## 2021-04-07 ENCOUNTER — APPOINTMENT (OUTPATIENT)
Dept: HEMATOLOGY ONCOLOGY | Facility: CLINIC | Age: 21
End: 2021-04-07

## 2021-04-07 LAB
BASOPHILS # BLD AUTO: 0.03 K/UL — SIGNIFICANT CHANGE UP (ref 0–0.2)
BASOPHILS NFR BLD AUTO: 0.4 % — SIGNIFICANT CHANGE UP (ref 0–2)
EOSINOPHIL # BLD AUTO: 0.12 K/UL — SIGNIFICANT CHANGE UP (ref 0–0.5)
EOSINOPHIL NFR BLD AUTO: 1.5 % — SIGNIFICANT CHANGE UP (ref 0–6)
HCT VFR BLD CALC: 36.4 % — SIGNIFICANT CHANGE UP (ref 34.5–45)
HGB BLD-MCNC: 12.3 G/DL — SIGNIFICANT CHANGE UP (ref 11.5–15.5)
IMM GRANULOCYTES NFR BLD AUTO: 0.4 % — SIGNIFICANT CHANGE UP (ref 0–1.5)
LYMPHOCYTES # BLD AUTO: 1.93 K/UL — SIGNIFICANT CHANGE UP (ref 1–3.3)
LYMPHOCYTES # BLD AUTO: 23.7 % — SIGNIFICANT CHANGE UP (ref 13–44)
MCHC RBC-ENTMCNC: 28.1 PG — SIGNIFICANT CHANGE UP (ref 27–34)
MCHC RBC-ENTMCNC: 33.8 G/DL — SIGNIFICANT CHANGE UP (ref 32–36)
MCV RBC AUTO: 83.3 FL — SIGNIFICANT CHANGE UP (ref 80–100)
MONOCYTES # BLD AUTO: 0.46 K/UL — SIGNIFICANT CHANGE UP (ref 0–0.9)
MONOCYTES NFR BLD AUTO: 5.7 % — SIGNIFICANT CHANGE UP (ref 2–14)
NEUTROPHILS # BLD AUTO: 5.57 K/UL — SIGNIFICANT CHANGE UP (ref 1.8–7.4)
NEUTROPHILS NFR BLD AUTO: 68.3 % — SIGNIFICANT CHANGE UP (ref 43–77)
NRBC # BLD: 0 /100 WBCS — SIGNIFICANT CHANGE UP (ref 0–0)
PLATELET # BLD AUTO: 93 K/UL — LOW (ref 150–400)
RBC # BLD: 4.37 M/UL — SIGNIFICANT CHANGE UP (ref 3.8–5.2)
RBC # FLD: 13.2 % — SIGNIFICANT CHANGE UP (ref 10.3–14.5)
WBC # BLD: 8.14 K/UL — SIGNIFICANT CHANGE UP (ref 3.8–10.5)
WBC # FLD AUTO: 8.14 K/UL — SIGNIFICANT CHANGE UP (ref 3.8–10.5)

## 2021-04-12 NOTE — REVIEW OF SYSTEMS
[Fever] : no fever [Chills] : no chills [Night Sweats] : no night sweats [Fatigue] : no fatigue [Recent Change In Weight] : ~T no recent weight change [Negative] : Allergic/Immunologic

## 2021-04-12 NOTE — HISTORY OF PRESENT ILLNESS
[de-identified] : ITP\par \par This is a 20 year old female with a history of eczema/asthma who is here for an evaluation of thrombocytopenia.  She was admitted to [facility] for 3 days at the end of April for ITP.  She noticed petechiae on her chest wall, leg, mouth bleeding, longer menstrual cycle (9 days instead of 4- not more heavy).  She was found to have low plt, unknown level.  She was given a platelet transfusion followed by IVIG.  She was seen by hematology, unknown who.  She was not started on any steroids.  She was discharged, called her PCP, Dr. Pollack the following day for recurrent oral bleeding.  She was seen at [facility], plt at that time was 181,000 on 4/27.  She was seen by Dr. Pollack yesterday, plt were found to be 32, repeated later 26.  During the hospitalization, she was told she had a RLL pneumonia and was treated with cefdinir and azithromycin.   [de-identified] : Patient presents for follow up appointment. She has not tapered her steroids, remains on prednisone 5 mg a day.  She abruptly stopped the steroids 2-3 days ago.  She has no fever/chills, no cough, no SOB, no abdominal pain, no n/v/d, no melena/BRBPR.

## 2021-04-12 NOTE — ASSESSMENT
[FreeTextEntry1] : This is a 21 year old female with ITP.  Initially diagnosed at [facility] in the end of April- no records.  She was treated with cefdinir/zithromax for a RLL pneumonia as well as IVIG/plt transfusion. \par No steroids given per patient. \par \par She has intermittent relapses off the steroids.  \par Her plt are 78 today.  No immediate intervention as her plt >30.  She will resume the prednisone, 2.5 mg a day.  \par Repeat her plt in 2-3 days.  If improved, will continue with a low dose of steroids, 2.5 mg a day.  If no improvement, will need to consider treatment with IVIG and a higher dose of steroids.\par Consider Rituxan, if there is another relapse in the future.\par Patient is aware of signs/symptoms of relapse, she will call or come if there are any new symptoms. \par Follow up in 1 month. \par

## 2021-04-13 LAB
ALBUMIN SERPL ELPH-MCNC: 4.1 G/DL
ALBUMIN SERPL ELPH-MCNC: 4.1 G/DL
ALP BLD-CCNC: 71 U/L
ALP BLD-CCNC: 74 U/L
ALT SERPL-CCNC: 15 U/L
ALT SERPL-CCNC: 19 U/L
ANION GAP SERPL CALC-SCNC: 11 MMOL/L
ANION GAP SERPL CALC-SCNC: 13 MMOL/L
APTT BLD: 29.9 SEC
AST SERPL-CCNC: 14 U/L
AST SERPL-CCNC: 17 U/L
BASOPHILS # BLD AUTO: 0.03 K/UL
BASOPHILS # BLD AUTO: 0.06 K/UL
BASOPHILS NFR BLD AUTO: 0.2 %
BASOPHILS NFR BLD AUTO: 0.3 %
BILIRUB SERPL-MCNC: 0.2 MG/DL
BILIRUB SERPL-MCNC: <0.2 MG/DL
BUN SERPL-MCNC: 18 MG/DL
BUN SERPL-MCNC: 18 MG/DL
CALCIUM SERPL-MCNC: 9.6 MG/DL
CALCIUM SERPL-MCNC: 9.6 MG/DL
CHLORIDE SERPL-SCNC: 104 MMOL/L
CHLORIDE SERPL-SCNC: 105 MMOL/L
CO2 SERPL-SCNC: 21 MMOL/L
CO2 SERPL-SCNC: 26 MMOL/L
CREAT SERPL-MCNC: 0.69 MG/DL
CREAT SERPL-MCNC: 0.8 MG/DL
EOSINOPHIL # BLD AUTO: 0 K/UL
EOSINOPHIL # BLD AUTO: 0.01 K/UL
EOSINOPHIL # BLD AUTO: 0.02 K/UL
EOSINOPHIL # BLD AUTO: 0.03 K/UL
EOSINOPHIL NFR BLD AUTO: 0 %
EOSINOPHIL NFR BLD AUTO: 0.1 %
EOSINOPHIL NFR BLD AUTO: 0.1 %
EOSINOPHIL NFR BLD AUTO: 0.2 %
FIBRINOGEN PPP COAG.DERIVED-MCNC: 540 MG/DL
FSP TITR PPP LA: >=5 <20 UG/ML
GLUCOSE SERPL-MCNC: 103 MG/DL
GLUCOSE SERPL-MCNC: 89 MG/DL
HCT VFR BLD CALC: 37.7 %
HCT VFR BLD CALC: 41 %
HCT VFR BLD CALC: 41.3 %
HCT VFR BLD CALC: 42.7 %
HGB BLD-MCNC: 11.7 G/DL
HGB BLD-MCNC: 12.4 G/DL
HGB BLD-MCNC: 13 G/DL
HGB BLD-MCNC: 13.1 G/DL
IMM GRANULOCYTES NFR BLD AUTO: 0.4 %
IMM GRANULOCYTES NFR BLD AUTO: 0.5 %
IMM GRANULOCYTES NFR BLD AUTO: 0.6 %
IMM GRANULOCYTES NFR BLD AUTO: 1 %
INR PPP: 0.98 RATIO
LYMPHOCYTES # BLD AUTO: 2.15 K/UL
LYMPHOCYTES # BLD AUTO: 6.91 K/UL
LYMPHOCYTES # BLD AUTO: 7.97 K/UL
LYMPHOCYTES # BLD AUTO: 8.22 K/UL
LYMPHOCYTES NFR BLD AUTO: 17.2 %
LYMPHOCYTES NFR BLD AUTO: 42.9 %
LYMPHOCYTES NFR BLD AUTO: 43.6 %
LYMPHOCYTES NFR BLD AUTO: 56.6 %
MAN DIFF?: NORMAL
MCHC RBC-ENTMCNC: 27.4 PG
MCHC RBC-ENTMCNC: 27.5 PG
MCHC RBC-ENTMCNC: 27.9 PG
MCHC RBC-ENTMCNC: 28.2 PG
MCHC RBC-ENTMCNC: 30.2 GM/DL
MCHC RBC-ENTMCNC: 30.4 GM/DL
MCHC RBC-ENTMCNC: 31 GM/DL
MCHC RBC-ENTMCNC: 31.7 GM/DL
MCV RBC AUTO: 89 FL
MCV RBC AUTO: 90 FL
MCV RBC AUTO: 90.3 FL
MCV RBC AUTO: 90.7 FL
MONOCYTES # BLD AUTO: 0.44 K/UL
MONOCYTES # BLD AUTO: 0.8 K/UL
MONOCYTES # BLD AUTO: 1.09 K/UL
MONOCYTES # BLD AUTO: 1.15 K/UL
MONOCYTES NFR BLD AUTO: 3.5 %
MONOCYTES NFR BLD AUTO: 5.5 %
MONOCYTES NFR BLD AUTO: 6.3 %
MONOCYTES NFR BLD AUTO: 6.8 %
NEUTROPHILS # BLD AUTO: 5.35 K/UL
NEUTROPHILS # BLD AUTO: 7.99 K/UL
NEUTROPHILS # BLD AUTO: 8.98 K/UL
NEUTROPHILS # BLD AUTO: 9.73 K/UL
NEUTROPHILS NFR BLD AUTO: 36.9 %
NEUTROPHILS NFR BLD AUTO: 49.3 %
NEUTROPHILS NFR BLD AUTO: 49.5 %
NEUTROPHILS NFR BLD AUTO: 78.1 %
PLATELET # BLD AUTO: 315 K/UL
PLATELET # BLD AUTO: 394 K/UL
PLATELET # BLD AUTO: 398 K/UL
PLATELET # BLD AUTO: 512 K/UL
POTASSIUM SERPL-SCNC: 4.1 MMOL/L
POTASSIUM SERPL-SCNC: 4.7 MMOL/L
PROT SERPL-MCNC: 6.9 G/DL
PROT SERPL-MCNC: 7.7 G/DL
PT BLD: 11.1 SEC
RBC # BLD: 4.19 M/UL
RBC # BLD: 4.52 M/UL
RBC # BLD: 4.64 M/UL
RBC # BLD: 4.73 M/UL
RBC # FLD: 13.6 %
RBC # FLD: 14.1 %
RBC # FLD: 14.5 %
RBC # FLD: 14.6 %
SODIUM SERPL-SCNC: 139 MMOL/L
SODIUM SERPL-SCNC: 141 MMOL/L
WBC # FLD AUTO: 12.48 K/UL
WBC # FLD AUTO: 14.52 K/UL
WBC # FLD AUTO: 16.11 K/UL
WBC # FLD AUTO: 18.26 K/UL

## 2021-04-17 ENCOUNTER — NON-APPOINTMENT (OUTPATIENT)
Age: 21
End: 2021-04-17

## 2021-04-17 ENCOUNTER — INPATIENT (INPATIENT)
Facility: HOSPITAL | Age: 21
LOS: 2 days | Discharge: HOME | DRG: 813 | End: 2021-04-20
Attending: HOSPITALIST | Admitting: STUDENT IN AN ORGANIZED HEALTH CARE EDUCATION/TRAINING PROGRAM
Payer: COMMERCIAL

## 2021-04-17 VITALS
DIASTOLIC BLOOD PRESSURE: 98 MMHG | TEMPERATURE: 98 F | RESPIRATION RATE: 18 BRPM | HEIGHT: 63 IN | SYSTOLIC BLOOD PRESSURE: 140 MMHG | HEART RATE: 99 BPM | WEIGHT: 229.94 LBS | OXYGEN SATURATION: 99 %

## 2021-04-17 LAB
ALBUMIN SERPL ELPH-MCNC: 4.3 G/DL — SIGNIFICANT CHANGE UP (ref 3.3–5)
ALP SERPL-CCNC: 89 U/L — SIGNIFICANT CHANGE UP (ref 40–120)
ALT FLD-CCNC: 12 U/L — SIGNIFICANT CHANGE UP (ref 10–45)
ANION GAP SERPL CALC-SCNC: 13 MMOL/L — SIGNIFICANT CHANGE UP (ref 5–17)
APTT BLD: 31.5 SEC — SIGNIFICANT CHANGE UP (ref 27.5–35.5)
AST SERPL-CCNC: 19 U/L — SIGNIFICANT CHANGE UP (ref 10–40)
BASOPHILS # BLD AUTO: 0.05 K/UL — SIGNIFICANT CHANGE UP (ref 0–0.2)
BASOPHILS NFR BLD AUTO: 0.6 % — SIGNIFICANT CHANGE UP (ref 0–2)
BILIRUB SERPL-MCNC: 0.1 MG/DL — LOW (ref 0.2–1.2)
BUN SERPL-MCNC: 19 MG/DL — SIGNIFICANT CHANGE UP (ref 7–23)
CALCIUM SERPL-MCNC: 9.3 MG/DL — SIGNIFICANT CHANGE UP (ref 8.4–10.5)
CHLORIDE SERPL-SCNC: 106 MMOL/L — SIGNIFICANT CHANGE UP (ref 96–108)
CO2 SERPL-SCNC: 23 MMOL/L — SIGNIFICANT CHANGE UP (ref 22–31)
CREAT SERPL-MCNC: 0.64 MG/DL — SIGNIFICANT CHANGE UP (ref 0.5–1.3)
EOSINOPHIL # BLD AUTO: 0.34 K/UL — SIGNIFICANT CHANGE UP (ref 0–0.5)
EOSINOPHIL NFR BLD AUTO: 3.9 % — SIGNIFICANT CHANGE UP (ref 0–6)
GLUCOSE SERPL-MCNC: 117 MG/DL — HIGH (ref 70–99)
HCT VFR BLD CALC: 37.4 % — SIGNIFICANT CHANGE UP (ref 34.5–45)
HGB BLD-MCNC: 12.6 G/DL — SIGNIFICANT CHANGE UP (ref 11.5–15.5)
IMM GRANULOCYTES NFR BLD AUTO: 0.3 % — SIGNIFICANT CHANGE UP (ref 0–1.5)
INR BLD: 0.92 RATIO — SIGNIFICANT CHANGE UP (ref 0.88–1.16)
LYMPHOCYTES # BLD AUTO: 4.02 K/UL — HIGH (ref 1–3.3)
LYMPHOCYTES # BLD AUTO: 46 % — HIGH (ref 13–44)
MCHC RBC-ENTMCNC: 27.6 PG — SIGNIFICANT CHANGE UP (ref 27–34)
MCHC RBC-ENTMCNC: 33.7 GM/DL — SIGNIFICANT CHANGE UP (ref 32–36)
MCV RBC AUTO: 82 FL — SIGNIFICANT CHANGE UP (ref 80–100)
MONOCYTES # BLD AUTO: 0.64 K/UL — SIGNIFICANT CHANGE UP (ref 0–0.9)
MONOCYTES NFR BLD AUTO: 7.3 % — SIGNIFICANT CHANGE UP (ref 2–14)
NEUTROPHILS # BLD AUTO: 3.65 K/UL — SIGNIFICANT CHANGE UP (ref 1.8–7.4)
NEUTROPHILS NFR BLD AUTO: 41.9 % — LOW (ref 43–77)
NRBC # BLD: 0 /100 WBCS — SIGNIFICANT CHANGE UP (ref 0–0)
PLATELET # BLD AUTO: 25 K/UL — LOW (ref 150–400)
POTASSIUM SERPL-MCNC: 4.1 MMOL/L — SIGNIFICANT CHANGE UP (ref 3.5–5.3)
POTASSIUM SERPL-SCNC: 4.1 MMOL/L — SIGNIFICANT CHANGE UP (ref 3.5–5.3)
PROT SERPL-MCNC: 7.7 G/DL — SIGNIFICANT CHANGE UP (ref 6–8.3)
PROTHROM AB SERPL-ACNC: 11.1 SEC — SIGNIFICANT CHANGE UP (ref 10.6–13.6)
RBC # BLD: 4.56 M/UL — SIGNIFICANT CHANGE UP (ref 3.8–5.2)
RBC # FLD: 13.2 % — SIGNIFICANT CHANGE UP (ref 10.3–14.5)
SODIUM SERPL-SCNC: 142 MMOL/L — SIGNIFICANT CHANGE UP (ref 135–145)
WBC # BLD: 8.73 K/UL — SIGNIFICANT CHANGE UP (ref 3.8–10.5)
WBC # FLD AUTO: 8.73 K/UL — SIGNIFICANT CHANGE UP (ref 3.8–10.5)

## 2021-04-17 PROCEDURE — 93010 ELECTROCARDIOGRAM REPORT: CPT

## 2021-04-17 PROCEDURE — 99285 EMERGENCY DEPT VISIT HI MDM: CPT

## 2021-04-17 NOTE — ED PROVIDER NOTE - PROGRESS NOTE DETAILS
Spoke with hematology,, recommends prednisone orally 1mg/kg if platelets less than 50 and admit to medicine

## 2021-04-17 NOTE — ED PROVIDER NOTE - ATTENDING CONTRIBUTION TO CARE
Dr. Brenna Nicolas: 21 year old female with history ITP, asthma, eczema sent to for thrombocytopenia. Pt is chronically on prednisone, dose was recently reduced. No HA, hematemesis, hematochezia. Reported heavy menstrual bleeding. Plan: Labs. Likely admit

## 2021-04-17 NOTE — ED ADULT TRIAGE NOTE - IDEAL BODY WEIGHT(KG)
Gastroenterology Outpatient History and Physical 
 
Patient: Amparo Quick Physician: Vicente Figueredo MD 
 
Vital Signs: Blood pressure 132/62, pulse 73, temperature 98 °F (36.7 °C), resp. rate 18, height 5' 5\" (1.651 m), weight 65.8 kg (145 lb), SpO2 95 %. Allergies: Allergies Allergen Reactions  Morphine Sulfate Anxiety and Other (comments) Patient states \"it makes me crazy every time! \"  Codeine Other (comments) HYPERACTIVE Chief Complaint: history of colon cancer History of Present Illness: as above Justification for Procedure: as above History: 
Past Medical History:  
Diagnosis Date  Arthritis   
 fingers  Cancer [facility])   
 bladder  Other ill-defined conditions(799.89) flat polyps in colon  Other ill-defined conditions(799.89) urinary incontinance  Psychiatric disorder   
 anxiety Past Surgical History:  
Procedure Laterality Date  ECHO 2D ADULT  11 LV upper limits of normal, EF 60%. mild-,od MR, mild aortic sclerosis, RVSP 50 mmHg  HX COLECTOMY partial  
 HX UROLOGICAL    
 removal precancerous polyps    STRESS TEST CARDIOLITE  11  
 3 min, normal perfusion, EF 74% Social History Socioeconomic History  Marital status:  Spouse name: Not on file  Number of children: Not on file  Years of education: Not on file  Highest education level: Not on file Tobacco Use  Smoking status: Former Smoker Last attempt to quit: 1964 Years since quittin.2  Smokeless tobacco: Never Used Substance and Sexual Activity  Alcohol use: Yes Alcohol/week: 1.0 oz Types: 2 Glasses of wine per week Comment: occaisionally  Drug use: No  
  
Family History Problem Relation Age of Onset  Cancer Mother Medications:  
Prior to Admission medications Medication Sig Start Date End Date Taking? Authorizing Provider cetirizine (ZYRTEC) 10 mg tablet Take 5 mg by mouth. Yes Provider, Historical  
CRANBERRY EXTRACT (THERACRAN PO) Take  by mouth. Yes Provider, Historical  
lisinopril (PRINIVIL, ZESTRIL) 10 mg tablet Take 5 mg by mouth nightly. Yes Provider, Historical  
cholecalciferol, vitamin d3, (VITAMIN D) 1,000 unit tablet Take 2,000 Units by mouth daily. Yes Provider, Historical  
CALCIUM CARBONATE/VITAMIN D3 (CALTRATE 600 + D PO) Take 1 Tab by mouth daily. Yes Provider, Historical  
omeprazole (PRILOSEC) 20 mg capsule Take 1 Cap by mouth Before breakfast and dinner. Indications: gastroesophageal reflux disease 5/14/18   Laurent Clarke MD  
 
 
Physical Exam:  
General: alert, no distress HEENT: Head: Normocephalic, no lesions, without obvious abnormality. Heart: regular rate and rhythm, S1, S2 normal, no murmur, click, rub or gallop Lungs: chest clear, no wheezing, rales, normal symmetric air entry Abdominal: Bowel sounds are normal, liver is not enlarged, spleen is not enlarged Neurological: Grossly normal  
Extremities: extremities normal, atraumatic, no cyanosis or edema Findings/Diagnosis: as above Plan of Care/Planned Procedure: colonoscopy Signed By: Jamison Ramirez MD   
 November 21, 2018 52

## 2021-04-17 NOTE — ED PROVIDER NOTE - OBJECTIVE STATEMENT
20 yo F with hx of ITP on 2.5mg of prednisone sent by hematologist Dr. Urrtuia for platelets of 23. Reports right sided arm bruising and increased menstrual bleeding; 4 pads daily. Denies oral mucosa bleeding, epistaxis, blood in stool, lightheadedness. chest pain or sob. No recent respiratory infections, fever, back pain or abdominal pain.

## 2021-04-17 NOTE — ED PROVIDER NOTE - PHYSICAL EXAMINATION
Gen: AAOx3, non-toxic  Head: NCAT  HEENT: EOMI, oral mucosa moist, normal conjunctiva  Lung: CTAB, no respiratory distress, no wheezes/rhonchi/rales B/L, speaking in full sentences  CV: RRR, no murmurs, rubs or gallops  Abd: soft, NTND, no guarding, no CVA tenderness  MSK: no visible deformities  Neuro: No focal sensory or motor deficits, normal CN exam   Skin: Right arm petechiae, no oral mucosa bleeding or epistaxis   Psych: normal affect.

## 2021-04-17 NOTE — ED PROVIDER NOTE - CLINICAL SUMMARY MEDICAL DECISION MAKING FREE TEXT BOX
22 yo F with hx of ITP on 2.5mg of prednisone sent by hematologist Dr. Urrutia for platelets of 23. Right arm petechiae, no oral mucosa bleeding or epistaxis. Likely recurrent ITP. No indication for IVIG given no life-threatening hemorrhage. Will repeat platelets, steroids as indicated and likely admit

## 2021-04-17 NOTE — ED ADULT TRIAGE NOTE - CHIEF COMPLAINT QUOTE
Low platelet count. Reports platelet count was 29. Noticed petechiae on the arms yesterday. PMH ITP. Currently on menstrual cycle. Needed a platelet transfusion March 2020.

## 2021-04-18 DIAGNOSIS — R09.89 OTHER SPECIFIED SYMPTOMS AND SIGNS INVOLVING THE CIRCULATORY AND RESPIRATORY SYSTEMS: ICD-10-CM

## 2021-04-18 DIAGNOSIS — D69.6 THROMBOCYTOPENIA, UNSPECIFIED: ICD-10-CM

## 2021-04-18 DIAGNOSIS — D69.3 IMMUNE THROMBOCYTOPENIC PURPURA: ICD-10-CM

## 2021-04-18 DIAGNOSIS — J45.909 UNSPECIFIED ASTHMA, UNCOMPLICATED: ICD-10-CM

## 2021-04-18 DIAGNOSIS — Z29.9 ENCOUNTER FOR PROPHYLACTIC MEASURES, UNSPECIFIED: ICD-10-CM

## 2021-04-18 LAB
ANION GAP SERPL CALC-SCNC: 11 MMOL/L — SIGNIFICANT CHANGE UP (ref 5–17)
APPEARANCE UR: CLEAR — SIGNIFICANT CHANGE UP
BASOPHILS # BLD AUTO: 0.01 K/UL — SIGNIFICANT CHANGE UP (ref 0–0.2)
BASOPHILS NFR BLD AUTO: 0.2 % — SIGNIFICANT CHANGE UP (ref 0–2)
BILIRUB UR-MCNC: NEGATIVE — SIGNIFICANT CHANGE UP
BUN SERPL-MCNC: 16 MG/DL — SIGNIFICANT CHANGE UP (ref 7–23)
CALCIUM SERPL-MCNC: 9.8 MG/DL — SIGNIFICANT CHANGE UP (ref 8.4–10.5)
CHLORIDE SERPL-SCNC: 106 MMOL/L — SIGNIFICANT CHANGE UP (ref 96–108)
CO2 SERPL-SCNC: 20 MMOL/L — LOW (ref 22–31)
COLOR SPEC: COLORLESS — SIGNIFICANT CHANGE UP
CREAT SERPL-MCNC: 0.55 MG/DL — SIGNIFICANT CHANGE UP (ref 0.5–1.3)
DIFF PNL FLD: ABNORMAL
EOSINOPHIL # BLD AUTO: 0.01 K/UL — SIGNIFICANT CHANGE UP (ref 0–0.5)
EOSINOPHIL NFR BLD AUTO: 0.2 % — SIGNIFICANT CHANGE UP (ref 0–6)
GLUCOSE SERPL-MCNC: 139 MG/DL — HIGH (ref 70–99)
GLUCOSE UR QL: NEGATIVE — SIGNIFICANT CHANGE UP
HCT VFR BLD CALC: 39.7 % — SIGNIFICANT CHANGE UP (ref 34.5–45)
HGB BLD-MCNC: 13.1 G/DL — SIGNIFICANT CHANGE UP (ref 11.5–15.5)
IMM GRANULOCYTES NFR BLD AUTO: 0.3 % — SIGNIFICANT CHANGE UP (ref 0–1.5)
KETONES UR-MCNC: NEGATIVE — SIGNIFICANT CHANGE UP
LEUKOCYTE ESTERASE UR-ACNC: ABNORMAL
LYMPHOCYTES # BLD AUTO: 1.2 K/UL — SIGNIFICANT CHANGE UP (ref 1–3.3)
LYMPHOCYTES # BLD AUTO: 18.6 % — SIGNIFICANT CHANGE UP (ref 13–44)
MAGNESIUM SERPL-MCNC: 2 MG/DL — SIGNIFICANT CHANGE UP (ref 1.6–2.6)
MCHC RBC-ENTMCNC: 27.5 PG — SIGNIFICANT CHANGE UP (ref 27–34)
MCHC RBC-ENTMCNC: 33 GM/DL — SIGNIFICANT CHANGE UP (ref 32–36)
MCV RBC AUTO: 83.4 FL — SIGNIFICANT CHANGE UP (ref 80–100)
MONOCYTES # BLD AUTO: 0.1 K/UL — SIGNIFICANT CHANGE UP (ref 0–0.9)
MONOCYTES NFR BLD AUTO: 1.6 % — LOW (ref 2–14)
NEUTROPHILS # BLD AUTO: 5.1 K/UL — SIGNIFICANT CHANGE UP (ref 1.8–7.4)
NEUTROPHILS NFR BLD AUTO: 79.1 % — HIGH (ref 43–77)
NITRITE UR-MCNC: NEGATIVE — SIGNIFICANT CHANGE UP
NRBC # BLD: 0 /100 WBCS — SIGNIFICANT CHANGE UP (ref 0–0)
PH UR: 6.5 — SIGNIFICANT CHANGE UP (ref 5–8)
PHOSPHATE SERPL-MCNC: 1.5 MG/DL — LOW (ref 2.5–4.5)
PLATELET # BLD AUTO: 21 K/UL — LOW (ref 150–400)
POTASSIUM SERPL-MCNC: 4.6 MMOL/L — SIGNIFICANT CHANGE UP (ref 3.5–5.3)
POTASSIUM SERPL-SCNC: 4.6 MMOL/L — SIGNIFICANT CHANGE UP (ref 3.5–5.3)
PROT UR-MCNC: SIGNIFICANT CHANGE UP
RBC # BLD: 4.76 M/UL — SIGNIFICANT CHANGE UP (ref 3.8–5.2)
RBC # FLD: 13.3 % — SIGNIFICANT CHANGE UP (ref 10.3–14.5)
SARS-COV-2 RNA SPEC QL NAA+PROBE: SIGNIFICANT CHANGE UP
SODIUM SERPL-SCNC: 137 MMOL/L — SIGNIFICANT CHANGE UP (ref 135–145)
SP GR SPEC: 1.02 — SIGNIFICANT CHANGE UP (ref 1.01–1.02)
UROBILINOGEN FLD QL: NEGATIVE — SIGNIFICANT CHANGE UP
WBC # BLD: 6.44 K/UL — SIGNIFICANT CHANGE UP (ref 3.8–10.5)
WBC # FLD AUTO: 6.44 K/UL — SIGNIFICANT CHANGE UP (ref 3.8–10.5)

## 2021-04-18 PROCEDURE — 99223 1ST HOSP IP/OBS HIGH 75: CPT

## 2021-04-18 PROCEDURE — 12345: CPT | Mod: NC

## 2021-04-18 PROCEDURE — 70450 CT HEAD/BRAIN W/O DYE: CPT | Mod: 26

## 2021-04-18 PROCEDURE — 99233 SBSQ HOSP IP/OBS HIGH 50: CPT | Mod: GC

## 2021-04-18 PROCEDURE — 71045 X-RAY EXAM CHEST 1 VIEW: CPT | Mod: 26

## 2021-04-18 RX ORDER — ACETAMINOPHEN 500 MG
650 TABLET ORAL EVERY 6 HOURS
Refills: 0 | Status: DISCONTINUED | OUTPATIENT
Start: 2021-04-18 | End: 2021-04-20

## 2021-04-18 RX ORDER — IPRATROPIUM/ALBUTEROL SULFATE 18-103MCG
3 AEROSOL WITH ADAPTER (GRAM) INHALATION EVERY 6 HOURS
Refills: 0 | Status: DISCONTINUED | OUTPATIENT
Start: 2021-04-18 | End: 2021-04-20

## 2021-04-18 RX ORDER — LANOLIN ALCOHOL/MO/W.PET/CERES
3 CREAM (GRAM) TOPICAL ONCE
Refills: 0 | Status: COMPLETED | OUTPATIENT
Start: 2021-04-18 | End: 2021-04-18

## 2021-04-18 RX ORDER — FAMOTIDINE 10 MG/ML
20 INJECTION INTRAVENOUS DAILY
Refills: 0 | Status: DISCONTINUED | OUTPATIENT
Start: 2021-04-18 | End: 2021-04-20

## 2021-04-18 RX ADMIN — Medication 650 MILLIGRAM(S): at 18:52

## 2021-04-18 RX ADMIN — Medication 100 MILLIGRAM(S): at 12:22

## 2021-04-18 RX ADMIN — Medication 100 MILLIGRAM(S): at 00:01

## 2021-04-18 RX ADMIN — FAMOTIDINE 20 MILLIGRAM(S): 10 INJECTION INTRAVENOUS at 12:22

## 2021-04-18 RX ADMIN — Medication 650 MILLIGRAM(S): at 13:15

## 2021-04-18 RX ADMIN — Medication 650 MILLIGRAM(S): at 12:22

## 2021-04-18 RX ADMIN — Medication 3 MILLIGRAM(S): at 21:58

## 2021-04-18 NOTE — H&P ADULT - PROBLEM SELECTOR PLAN 1
worsening thrombocytopenia in setting of known ITP with recently lowered prednisone dosing to 2.5mg, exam with RUE ecchymoses, labs showing plt 25. Pt denies any other bleeding, Hb stable   - hem/onc called in ED, pt s/p 100mg prednisone (1mg/kg) per recs   - will c/t follow CBC closely - further treatment recs (ie IVIG, rituxan, further steroids) per heme in AM   - overall low suspicion, but given c/o headache and low plt count, will get CTH  - c/w pepcid for GI protection worsening thrombocytopenia in setting of known ITP with recently lowered prednisone dosing to 2.5mg, exam with RUE ecchymoses, labs showing plt 25. Pt denies any other bleeding, Hb stable   - hem/onc called in ED, pt s/p 100mg prednisone (1mg/kg) per recs   - will c/t follow CBC closely - further treatment recs (ie IVIG, rituxan, further steroids) per heme in AM   - overall low suspicion, but given c/o headache and low plt count, will get CTH  - check UA, CXR to r/o infectious etiology; f/u COVID  - c/w pepcid for GI protection

## 2021-04-18 NOTE — ED ADULT NURSE NOTE - OBJECTIVE STATEMENT
21yr old female w/ Hx of ITP (on 2.5mg of prednisone) presents to ED c/o low platelet count. Pt states she was advised by her hematologist to come to ED because her Plt count was 23. Pt also states her prednisone dose was recently decreased, and she reports bruising on her right arm. Pt states she is currently menstruating, but it is heavier than normal. Pt appears upset, and tearful, stating "she doesn't want to be here" and appears to get annoyed when asking her questions. She denies CP, fevers or chills, syncope, GI,  symptoms or SOB. Pt assessed by MD, bed lowered and locked, call bell within reach. will reassess

## 2021-04-18 NOTE — PATIENT PROFILE ADULT - NSASFALLATTEMPTOOB_GEN_A_NUR
no [Negative] : Heme/Lymph [Muscle Pain] : muscle pain [Back Pain] : back pain [FreeTextEntry9] : radiates down R leg

## 2021-04-18 NOTE — H&P ADULT - HISTORY OF PRESENT ILLNESS
21F with hx of ITP, asthma, eczema p/w bruising and low platelets on outpt labs. Pt was diagnosed with ITP in 4/2020 at [facility]; was admitted in 5/2020 at [facility] for IVIG with improvement of her counts. Has been following with Dr Urrutia since then. She had been on 5mg prednisone as maintenance medication, but earlier this month pt abruptly stopped taking her steroid for 1-2 days prior to her f/u appt with Dr Urrutia (4/5/21); had blood work at that visit that showed lower platelet counts (78k) than prior. She was restarted on a lower dose prednisone 2.5mg daily and had repeat labs on 4/7 showing some improvement in her plt count to 93, so decision made to c/w prednisone 2.5mg and to have repeat labs in 2 weeks. Pt endorses compliance to steroids, however she started noticing spontaneous bruising on her RUE the day prior to presentation; also started her menses that day and noted heavier bleeding than normal. Pt had blood work done on day of arrival 2/2 bruising, notable for plt count 29; d/w heme on call and advised to come to ED for further evaluation.   Denies any associated CP, SOB, BRBPR, hematuria, melena; denies any infectious symptoms - no fevers/chills, no cough, no abd pain, no nausea/vomiting, no diarrhea, no urinary symptoms. Endorses headache which she states she gets when her plt counts drop and generalized fatigue and weakness; denies any blurry vision, no focal weakness, no numbness/tingling, no gait changes, no fall, LOC or head strike.   Rest of ROS negative.

## 2021-04-18 NOTE — CONSULT NOTE ADULT - SUBJECTIVE AND OBJECTIVE BOX
HPI:  Patient is a 20 y/o F w/ a PMHx of ITP (on prednisone), asthma, and eczema who p/w bruising and low platelets on outpatient labs. Patient was diagnosed with ITP in 4/2020 at [facility] and was admitted in 5/2020 at [facility] for IVIG with improvement of her counts. She has followed with Dr. Linda Urrutia at the [facility] since then. She had been on 5mg prednisone as maintenance medication, but earlier this month, patient abruptly stopped taking her steroid for 1-2 days prior to her f/u appt with Dr Urrutia on 4/5/21. She had blood work at that visit which showed lower platelet counts (78k) than prior. She was restarted on a lower dose prednisone 2.5mg daily and had repeat labs on 4/7 which showed some improvement in her platelet count (93k), and so decision was made to c/w prednisone 2.5mg and to have repeat labs in 2 weeks. Patient reports adherence to steroids since that time; however, she started noticing spontaneous bruising on her RUE the day prior to presentation. She also started her menses that day and noted heavier bleeding than normal. Patient had blood work done on day of arrival 2/2 bruising which was notable for a platelet count of 29k. She called the Hematologist on call and was subsequently advised to go to ED for further evaluation. On ROS, patient denied any associated chest pain, shortness of breath, BRBPR, hematuria, melena, fevers, chills, cough, abdominal pain, nausea, vomiting, diarrhea, or urinary symptoms. She did endorse a headache which she states she gets when her platelet counts drop as well as generalized fatigue and weakness. No associated blurry vision, focal weakness, numbness/tingling, gait changes, fall, LOC, or head strike. In the ED, patient was tachycardic (HR = 101) and her BP was mildly elevated (140/98). She was found to have a PLT count of 25k. WBC and Hgb are WNL. She was given 1mg/kg Prednisone x1 in the ED and was admitted to Medicine for further management. Given patient's thrombocytopenia and history of ITP, Hematology was consulted for further evaluation.    Hematologic History:  Patient was diagnosed with ITP in 4/2020 at [facility]. She was given a platelet transfusion followed by IVIG. She was seen by hematology (unknown who). She was not started on any steroids. She was admitted to [facility] in 5/2020 where she received IVIG and steroids with improvement in her PLT count. She was discharged home on steroids which had been slowly tapered as an outpatient. She had been taking prednisone 2.5mg daily PTA. Patient follows with Dr. Linda Urrutia at the [facility].     PAST MEDICAL & SURGICAL HISTORY:  Eczema    Asthma    No significant past surgical history    Review of Systems:   CONSTITUTIONAL: + Generalized fatigue, No fever  EYES: No eye pain or discharge  ENMT: No sinus or throat pain  NECK: No pain or stiffness  RESPIRATORY: No shortness of breath or cough  CARDIOVASCULAR: No chest pain or palpitations  GASTROINTESTINAL: No vomiting or abdominal pain  GENITOURINARY: No dysuria or hematuria  NEUROLOGICAL: + Headache, No confusion  SKIN: No itching or rash  MUSCULOSKELETAL: No joint pain or back pain    Allergies    No Known Allergies    Intolerances    Social History: No smoking, EtOH, or illicit drug use    FAMILY HISTORY:  FHx: thyroid disease  mother        MEDICATIONS  (STANDING):  famotidine    Tablet 20 milliGRAM(s) Oral daily    MEDICATIONS  (PRN):  acetaminophen   Tablet .. 650 milliGRAM(s) Oral every 6 hours PRN Temp greater or equal to 38C (100.4F), Mild Pain (1 - 3)  albuterol/ipratropium for Nebulization 3 milliLiter(s) Nebulizer every 6 hours PRN Shortness of Breath and/or Wheezing        CAPILLARY BLOOD GLUCOSE        I&O's Summary    Vital Signs Last 24 Hrs  T(C): 36.9 (18 Apr 2021 06:10), Max: 36.9 (17 Apr 2021 21:43)  T(F): 98.4 (18 Apr 2021 06:10), Max: 98.5 (17 Apr 2021 21:43)  HR: 89 (18 Apr 2021 06:10) (89 - 101)  BP: 135/77 (18 Apr 2021 06:10) (135/77 - 146/85)  BP(mean): --  RR: 18 (18 Apr 2021 06:10) (18 - 19)  SpO2: 99% (18 Apr 2021 06:10) (98% - 100%)    PHYSICAL EXAM:  GENERAL: NAD, well-developed  HEAD:  Atraumatic, Normocephalic  EYES: EOMI, PERRLA, conjunctiva and sclera clear  NECK: Supple, No JVD  CHEST/LUNG: Clear to auscultation bilaterally; No wheeze  HEART: Regular rate and rhythm; No murmurs, rubs, or gallops  ABDOMEN: Soft, Nontender, Nondistended; Bowel sounds present  EXTREMITIES:  2+ Peripheral Pulses, No clubbing, cyanosis, or edema  PSYCH: AAOx3  NEUROLOGY: non-focal  SKIN: No rashes or lesions    LABS:                        12.6   8.73  )-----------( 25       ( 17 Apr 2021 23:15 )             37.4     04-17    142  |  106  |  19  ----------------------------<  117<H>  4.1   |  23  |  0.64    Ca    9.3      17 Apr 2021 23:15    TPro  7.7  /  Alb  4.3  /  TBili  0.1<L>  /  DBili  x   /  AST  19  /  ALT  12  /  AlkPhos  89  04-17    PT/INR - ( 17 Apr 2021 23:15 )   PT: 11.1 sec;   INR: 0.92 ratio         PTT - ( 17 Apr 2021 23:15 )  PTT:31.5 sec    RADIOLOGY & ADDITIONAL TESTS:  Studies reviewed.   HPI:  Patient is a 20 y/o F w/ a PMHx of ITP (on prednisone), asthma, and eczema who p/w bruising and low platelets on outpatient labs. Patient was diagnosed with ITP in 4/2020 at [facility] and was admitted in 5/2020 at [facility] for IVIG with improvement of her counts. She has followed with Dr. Linda Urrutia at the [facility] since then. She had been on 5mg prednisone as maintenance medication, but earlier this month, patient abruptly stopped taking her steroid for 1-2 days prior to her f/u appt with Dr Urrutia on 4/5/21. She had blood work at that visit which showed lower platelet counts (78k) than prior. She was restarted on a lower dose prednisone 2.5mg daily and had repeat labs on 4/7 which showed some improvement in her platelet count (93k), and so decision was made to c/w prednisone 2.5mg and to have repeat labs in 2 weeks. Patient reports adherence to steroids since that time; however, she started noticing spontaneous bruising on her RUE the day prior to presentation. She also started her menses that day and noted heavier bleeding than normal. Patient had blood work done on day of arrival 2/2 bruising which was notable for a platelet count of 29k. She called the Hematologist on call and was subsequently advised to go to ED for further evaluation. On ROS, patient denied any associated chest pain, shortness of breath, BRBPR, hematuria, melena, fevers, chills, cough, abdominal pain, nausea, vomiting, diarrhea, or urinary symptoms. She did endorse a headache which she states she gets when her platelet counts drop as well as generalized fatigue and weakness. No associated blurry vision, focal weakness, numbness/tingling, gait changes, fall, LOC, or head strike. In the ED, patient was tachycardic (HR = 101) and her BP was mildly elevated (140/98). She was found to have a PLT count of 25k. WBC and Hgb are WNL. She was given 1mg/kg Prednisone x1 in the ED and was admitted to Medicine for further management. Given patient's thrombocytopenia and history of ITP, Hematology was consulted for further evaluation.    Patient seen this AM. Above history was confirmed. She continues to endorse generalized fatigue. She also reports that she is on her period currently. No complaints of chest pain, shortness of breath, or abdominal pain. Patient was afebrile overnight.    Hematologic History:  Patient was diagnosed with ITP in 4/2020 at [facility]. She was given a platelet transfusion followed by IVIG. She was seen by hematology (unknown who). She was not started on any steroids. She was admitted to [facility] in 5/2020 where she received IVIG and steroids with improvement in her PLT count. She was discharged home on steroids which had been slowly tapered as an outpatient. She had been taking prednisone 2.5mg daily PTA. Patient follows with Dr. Linda Urrutia at the [facility].     PAST MEDICAL & SURGICAL HISTORY:  Eczema    Asthma    No significant past surgical history    Review of Systems:   CONSTITUTIONAL: + Generalized fatigue, No fever  EYES: No eye pain or discharge  ENMT: No sinus or throat pain  NECK: No pain or stiffness  RESPIRATORY: No shortness of breath or cough  CARDIOVASCULAR: No chest pain or palpitations  GASTROINTESTINAL: No vomiting or abdominal pain  GENITOURINARY: No dysuria or hematuria  NEUROLOGICAL: + Headache, No confusion  SKIN: No itching or rash  MUSCULOSKELETAL: No joint pain or back pain    Allergies    No Known Allergies    Intolerances    Social History: No smoking, EtOH, or illicit drug use    FAMILY HISTORY:  FHx: thyroid disease  mother        MEDICATIONS  (STANDING):  famotidine    Tablet 20 milliGRAM(s) Oral daily    MEDICATIONS  (PRN):  acetaminophen   Tablet .. 650 milliGRAM(s) Oral every 6 hours PRN Temp greater or equal to 38C (100.4F), Mild Pain (1 - 3)  albuterol/ipratropium for Nebulization 3 milliLiter(s) Nebulizer every 6 hours PRN Shortness of Breath and/or Wheezing        CAPILLARY BLOOD GLUCOSE        I&O's Summary    Vital Signs Last 24 Hrs  T(C): 36.9 (18 Apr 2021 06:10), Max: 36.9 (17 Apr 2021 21:43)  T(F): 98.4 (18 Apr 2021 06:10), Max: 98.5 (17 Apr 2021 21:43)  HR: 89 (18 Apr 2021 06:10) (89 - 101)  BP: 135/77 (18 Apr 2021 06:10) (135/77 - 146/85)  BP(mean): --  RR: 18 (18 Apr 2021 06:10) (18 - 19)  SpO2: 99% (18 Apr 2021 06:10) (98% - 100%)    PHYSICAL EXAM:  GENERAL: NAD, Laying in bed  HEENT: NC/AT, Sclera anicteric  NECK: Supple  CHEST/LUNG: Respirations nonlabored  HEART: RRR; +S1/S2  ABDOMEN: +BS, Soft, NT  EXTREMITIES: No LE edema  NEUROLOGY: Awake and alert, Answering questions and following commands appropriately  SKIN: Warm and dry  PSYCH: Calm and cooperative    LABS:                        12.6   8.73  )-----------( 25       ( 17 Apr 2021 23:15 )             37.4     04-17    142  |  106  |  19  ----------------------------<  117<H>  4.1   |  23  |  0.64    Ca    9.3      17 Apr 2021 23:15    TPro  7.7  /  Alb  4.3  /  TBili  0.1<L>  /  DBili  x   /  AST  19  /  ALT  12  /  AlkPhos  89  04-17    PT/INR - ( 17 Apr 2021 23:15 )   PT: 11.1 sec;   INR: 0.92 ratio         PTT - ( 17 Apr 2021 23:15 )  PTT:31.5 sec    RADIOLOGY & ADDITIONAL TESTS:  Studies reviewed.    Peripheral smear reviewed: Normochromic RBCs, Rare schistocyte, True thrombocytopenia, No platelet clumping seen, Multiple large platelets, PMNs seen w/o overt dysplasia

## 2021-04-18 NOTE — H&P ADULT - NSHPLABSRESULTS_GEN_ALL_CORE
Labs, imaging and EKG personally reviewed and interpreted by me.                           12.6   8.73  )-----------( 25       ( 17 Apr 2021 23:15 )             37.4     04-17    142  |  106  |  19  ----------------------------<  117<H>  4.1   |  23  |  0.64    Ca    9.3      17 Apr 2021 23:15    TPro  7.7  /  Alb  4.3  /  TBili  0.1<L>  /  DBili  x   /  AST  19  /  ALT  12  /  AlkPhos  89  04-17        PT/INR - ( 17 Apr 2021 23:15 )   PT: 11.1 sec;   INR: 0.92 ratio         PTT - ( 17 Apr 2021 23:15 )  PTT:31.5 sec Labs, imaging and EKG personally reviewed and interpreted by me.                           12.6   8.73  )-----------( 25       ( 17 Apr 2021 23:15 )             37.4     04-17    142  |  106  |  19  ----------------------------<  117<H>  4.1   |  23  |  0.64    Ca    9.3      17 Apr 2021 23:15    TPro  7.7  /  Alb  4.3  /  TBili  0.1<L>  /  DBili  x   /  AST  19  /  ALT  12  /  AlkPhos  89  04-17        PT/INR - ( 17 Apr 2021 23:15 )   PT: 11.1 sec;   INR: 0.92 ratio         PTT - ( 17 Apr 2021 23:15 )  PTT:31.5 sec    CXR reviewed - no focal opacities/consolidations   EKG reviewed

## 2021-04-18 NOTE — H&P ADULT - ASSESSMENT
21F with hx of ITP, asthma, eczema with recently lowered prednisone dosing for ITP, p/w spontaneous bruising found with thrombocytopenia to 25, admitted for further treatment.  21F with hx of ITP, asthma, eczema with recently lowered prednisone dosing for ITP, p/w spontaneous bruising found with acute thrombocytopenia to 25, admitted for further treatment.

## 2021-04-18 NOTE — CHART NOTE - NSCHARTNOTEFT_GEN_A_CORE
21F with hx of ITP, asthma, eczema with recently lowered prednisone dosing for ITP p/w spontaneous bruising, found with acute thrombocytopenia to 25.    Now on 1mg/kg/day of Prednisone. Monitor counts.     Currently on her periods, reports lower abdominal cramping and asked for Tylenol- given. Monitor.

## 2021-04-18 NOTE — CONSULT NOTE ADULT - ATTENDING COMMENTS
21 y.o female with h/o ITP admitted with thrombocytopenia.  Patient was on 2.5 mg prednisone at home, she denies any bleeding.  She noticed petechiae and had blood drawn , which showed a drop in platelets.  Agree with higher dose of prednisone. May need IVIG if platelets drop further.  PPI or H2 blocker.    Ingrid Zambrano MD  Attending, Heme/ Onc  [phone]

## 2021-04-18 NOTE — CONSULT NOTE ADULT - ASSESSMENT
Patient is a 20 y/o F w/ a PMHx of ITP (on prednisone), asthma, and eczema who p/w bruising and low platelets on outpatient labs, likely 2/2 ITP, and was admitted to Medicine for further management. Hematology consulted for further evaluation.    # Thrombocytopenia  - Likely 2/2 underlying ITP. Of note, pt's prednisone was recently tapered as an outpatient (pt had been taking 2.5mg QD PTA)   - PLT count on admission = 25k (prior = 93k on 4/7). WBC and Hgb are WNL  - Pt given 1mg/kg Prednisone x1 in the ED. Recommend continuing Prednisone 1mg/kg daily for now.  - Continue GI PPx while on high dose steroids  - Monitor CBC w/ diff daily and keep active T+S. Transfuse PLTs if there are any signs of bleeding  - Primary Hematologist: Dr. Linda Urrutia. Continue outpatient follow-up  - Will continue to follow-up      TO BE D/W ATTENDING    Chris Ferreira, PGY5  Hematology-Oncology Fellow  Pager: [phone] / [phone] Patient is a 20 y/o F w/ a PMHx of ITP (on prednisone), asthma, and eczema who p/w bruising and low platelets on outpatient labs, likely 2/2 ITP, and was admitted to Medicine for further management. Hematology consulted for further evaluation.    # Thrombocytopenia  - Likely 2/2 underlying ITP. Of note, pt's prednisone was recently tapered as an outpatient (pt had been taking 2.5mg QD PTA)   - PLT count on admission = 25k (prior = 93k on 4/7). WBC and Hgb are WNL  - Head CT (4/18): No acute intercranial hemorrhage, mass effect, or midline shift.  - Peripheral smear reviewed: Normochromic RBCs, Rare schistocyte, True thrombocytopenia, No platelet clumping seen, Multiple large platelets, PMNs seen w/o overt dysplasia  - Pt given 1mg/kg Prednisone x1 in the ED. Recommend continuing Prednisone 1mg/kg daily for now.  - Continue GI PPx while on high dose steroids  - Monitor CBC w/ diff daily and keep active T+S. Transfuse PLTs if there are any signs of bleeding  - Primary Hematologist: Dr. Linda Urrutia. Continue outpatient follow-up  - Will continue to follow    Plan d/w patient and primary team NP    Chris Ferreira, PGY5  Hematology-Oncology Fellow  Pager: [phone] / [phone]

## 2021-04-18 NOTE — H&P ADULT - NSHPPHYSICALEXAM_GEN_ALL_CORE
Vital Signs Last 24 Hrs  T(C): 36.7 (17 Apr 2021 23:05), Max: 36.9 (17 Apr 2021 21:43)  T(F): 98.1 (17 Apr 2021 23:05), Max: 98.5 (17 Apr 2021 21:43)  HR: 101 (17 Apr 2021 23:05) (99 - 101)  BP: 146/85 (17 Apr 2021 23:05) (140/98 - 146/85)  BP(mean): --  RR: 19 (17 Apr 2021 23:05) (18 - 19)  SpO2: 99% (17 Apr 2021 23:05) (99% - 99%)    PHYSICAL EXAM:  GENERAL: NAD, well-developed  HEAD:  Atraumatic, normocephalic  EYES: EOMI, conjunctiva and sclera clear  NECK: Supple, no JVD  CHEST/LUNG: Clear to auscultation bilaterally; no wheezing or rales  HEART: Regular rate and rhythm; no murmurs  ABDOMEN: Soft, nontender, nondistended; bowel sounds present  EXTREMITIES:  2+ Peripheral Pulses, no edema  PSYCH: calm affect, not anxious  NEUROLOGY: AAOx3, 5/5 strength in b/l UE/LE, EOMI, CN 2-12 grossly intact    SKIN: ecchymoses in RUE, eczematous changes in L elbow and back   MUSCULOSKELETAL: no back pain, moving all extremities

## 2021-04-19 LAB
ANION GAP SERPL CALC-SCNC: 9 MMOL/L — SIGNIFICANT CHANGE UP (ref 5–17)
BASOPHILS # BLD AUTO: 0.03 K/UL — SIGNIFICANT CHANGE UP (ref 0–0.2)
BASOPHILS NFR BLD AUTO: 0.2 % — SIGNIFICANT CHANGE UP (ref 0–2)
BUN SERPL-MCNC: 18 MG/DL — SIGNIFICANT CHANGE UP (ref 7–23)
CALCIUM SERPL-MCNC: 9.5 MG/DL — SIGNIFICANT CHANGE UP (ref 8.4–10.5)
CHLORIDE SERPL-SCNC: 107 MMOL/L — SIGNIFICANT CHANGE UP (ref 96–108)
CO2 SERPL-SCNC: 21 MMOL/L — LOW (ref 22–31)
COVID-19 SPIKE DOMAIN AB INTERP: NEGATIVE — SIGNIFICANT CHANGE UP
COVID-19 SPIKE DOMAIN ANTIBODY RESULT: 0.4 U/ML — SIGNIFICANT CHANGE UP
CREAT SERPL-MCNC: 0.54 MG/DL — SIGNIFICANT CHANGE UP (ref 0.5–1.3)
EOSINOPHIL # BLD AUTO: 0.01 K/UL — SIGNIFICANT CHANGE UP (ref 0–0.5)
EOSINOPHIL NFR BLD AUTO: 0.1 % — SIGNIFICANT CHANGE UP (ref 0–6)
GLUCOSE SERPL-MCNC: 115 MG/DL — HIGH (ref 70–99)
HCT VFR BLD CALC: 36.5 % — SIGNIFICANT CHANGE UP (ref 34.5–45)
HGB BLD-MCNC: 12.3 G/DL — SIGNIFICANT CHANGE UP (ref 11.5–15.5)
IMM GRANULOCYTES NFR BLD AUTO: 0.5 % — SIGNIFICANT CHANGE UP (ref 0–1.5)
LYMPHOCYTES # BLD AUTO: 22.6 % — SIGNIFICANT CHANGE UP (ref 13–44)
LYMPHOCYTES # BLD AUTO: 3.14 K/UL — SIGNIFICANT CHANGE UP (ref 1–3.3)
MCHC RBC-ENTMCNC: 27.3 PG — SIGNIFICANT CHANGE UP (ref 27–34)
MCHC RBC-ENTMCNC: 33.7 GM/DL — SIGNIFICANT CHANGE UP (ref 32–36)
MCV RBC AUTO: 81.1 FL — SIGNIFICANT CHANGE UP (ref 80–100)
MONOCYTES # BLD AUTO: 0.91 K/UL — HIGH (ref 0–0.9)
MONOCYTES NFR BLD AUTO: 6.6 % — SIGNIFICANT CHANGE UP (ref 2–14)
NEUTROPHILS # BLD AUTO: 9.72 K/UL — HIGH (ref 1.8–7.4)
NEUTROPHILS NFR BLD AUTO: 70 % — SIGNIFICANT CHANGE UP (ref 43–77)
NRBC # BLD: 0 /100 WBCS — SIGNIFICANT CHANGE UP (ref 0–0)
PLATELET # BLD AUTO: 56 K/UL — LOW (ref 150–400)
POTASSIUM SERPL-MCNC: 3.9 MMOL/L — SIGNIFICANT CHANGE UP (ref 3.5–5.3)
POTASSIUM SERPL-SCNC: 3.9 MMOL/L — SIGNIFICANT CHANGE UP (ref 3.5–5.3)
RBC # BLD: 4.5 M/UL — SIGNIFICANT CHANGE UP (ref 3.8–5.2)
RBC # FLD: 13.3 % — SIGNIFICANT CHANGE UP (ref 10.3–14.5)
SARS-COV-2 IGG+IGM SERPL QL IA: 0.4 U/ML — SIGNIFICANT CHANGE UP
SARS-COV-2 IGG+IGM SERPL QL IA: NEGATIVE — SIGNIFICANT CHANGE UP
SODIUM SERPL-SCNC: 137 MMOL/L — SIGNIFICANT CHANGE UP (ref 135–145)
WBC # BLD: 13.88 K/UL — HIGH (ref 3.8–10.5)
WBC # FLD AUTO: 13.88 K/UL — HIGH (ref 3.8–10.5)

## 2021-04-19 PROCEDURE — 99232 SBSQ HOSP IP/OBS MODERATE 35: CPT

## 2021-04-19 RX ADMIN — FAMOTIDINE 20 MILLIGRAM(S): 10 INJECTION INTRAVENOUS at 11:03

## 2021-04-19 RX ADMIN — Medication 100 MILLIGRAM(S): at 06:31

## 2021-04-19 NOTE — PROGRESS NOTE ADULT - SUBJECTIVE AND OBJECTIVE BOX
Hematology Oncology Follow-up    INTERVAL HPI/OVERNIGHT EVENTS:  No o/n events, patient resting comfortably.   Plt improving on steroids.     VITAL SIGNS:  T(F): 98.5 (21 @ 11:33)  HR: 82 (21 @ 11:33)  BP: 135/80 (21 @ 11:33)  RR: 18 (21 @ 11:33)  SpO2: 96% (21 @ 11:33)  Wt(kg): --        Review of Systems:  General: denies fevers/chills  Respiratory: denies cough, shortness of breath  Cardiovascular: denies chest pain, palpitations  Gastrointestinal: denies nausea, vomiting, abdominal pain, constipation, diarrhea, melena, hematochezia  MSK: denies joint pain or muscle pain  Neuro: denies headache, weakness, or parasthesias  Psych: denies anxiety or sleep disturbances    PHYSICAL EXAM:  Constitutional: NAD  Respiratory: CTA b/l, symmetric chest rise, with normal respiratory effort  Cardiovascular: RRR  Gastrointestinal: soft, NTND  MSK: no obvious abnormalities  Neurological: Grossly intact  Psych: normal affect    MEDICATIONS  (STANDING):  famotidine    Tablet 20 milliGRAM(s) Oral daily  predniSONE   Tablet 100 milliGRAM(s) Oral daily    MEDICATIONS  (PRN):  acetaminophen   Tablet .. 650 milliGRAM(s) Oral every 6 hours PRN Temp greater or equal to 38C (100.4F), Mild Pain (1 - 3)  albuterol/ipratropium for Nebulization 3 milliLiter(s) Nebulizer every 6 hours PRN Shortness of Breath and/or Wheezing      No Known Allergies      LABS:                        12.3   13.88 )-----------( 56       ( 2021 06:10 )             36.5     04-19    137  |  107  |  18  ----------------------------<  115<H>  3.9   |  21<L>  |  0.54    Ca    9.5      2021 06:09  Phos  1.5     04-18  Mg     2.0     -18    TPro  7.7  /  Alb  4.3  /  TBili  0.1<L>  /  DBili  x   /  AST  19  /  ALT  12  /  AlkPhos  89  04-17    PT/INR - ( 2021 23:15 )   PT: 11.1 sec;   INR: 0.92 ratio         PTT - ( 2021 23:15 )  PTT:31.5 sec   Urinalysis Basic - ( 2021 17:16 )    Color: Colorless / Appearance: Clear / S.017 / pH: x  Gluc: x / Ketone: Negative  / Bili: Negative / Urobili: Negative   Blood: x / Protein: Trace / Nitrite: Negative   Leuk Esterase: Small / RBC: 141 /hpf / WBC 7 /HPF   Sq Epi: x / Non Sq Epi: 4 /hpf / Bacteria: Few              Bilirubin: Negative (21 @ 17:16)      RADIOLOGY & ADDITIONAL TESTS:  Studies reviewed.
Santana Duron MD  Division of Hospital Medicine  Cell: [phone]  Pager: [phone]  Office: [phone]    Patient is a 21y old  Female who presents with a chief complaint of bruising, low platelets (2021 16:28)        SUBJECTIVE / OVERNIGHT EVENTS: Patient reports mild left-sided headache. She reports feeling tired. She says she is still menstruating.       MEDICATIONS  (STANDING):  famotidine    Tablet 20 milliGRAM(s) Oral daily  predniSONE   Tablet 100 milliGRAM(s) Oral daily    MEDICATIONS  (PRN):  acetaminophen   Tablet .. 650 milliGRAM(s) Oral every 6 hours PRN Temp greater or equal to 38C (100.4F), Mild Pain (1 - 3)  albuterol/ipratropium for Nebulization 3 milliLiter(s) Nebulizer every 6 hours PRN Shortness of Breath and/or Wheezing      Vital Signs Last 24 Hrs  T(C): 36.9 (2021 11:33), Max: 36.9 (2021 21:20)  T(F): 98.5 (2021 11:33), Max: 98.5 (2021 11:33)  HR: 82 (2021 11:33) (78 - 82)  BP: 135/80 (2021 11:33) (119/78 - 135/85)  BP(mean): --  RR: 18 (2021 11:33) (18 - 19)  SpO2: 96% (2021 11:33) (96% - 99%)  CAPILLARY BLOOD GLUCOSE        I&O's Summary        PHYSICAL EXAM:   GENERAL: NAD, well-developed  HEAD:  Atraumatic, Normocephalic  EYES:  conjunctiva and sclera clear  NECK: Supple,   CHEST/LUNG: Clear to auscultation bilaterally; No wheeze  HEART: S1S2 normal. Regular rate and rhythm; No murmurs, rubs, or gallops  ABDOMEN: Soft, Nontender, Nondistended; Bowel sounds present  EXTREMITIES:   No clubbing, cyanosis, or edema  PSYCH/Neuro: AAOx3. Non-focal.   SKIN: No rashes or lesions      LABS:                        12.3   13.88 )-----------( 56       ( 2021 06:10 )             36.5     04-19    137  |  107  |  18  ----------------------------<  115<H>  3.9   |  21<L>  |  0.54    Ca    9.5      2021 06:09  Phos  1.5     04-18  Mg     2.0     -18    TPro  7.7  /  Alb  4.3  /  TBili  0.1<L>  /  DBili  x   /  AST  19  /  ALT  12  /  AlkPhos  89  04-17    PT/INR - ( 2021 23:15 )   PT: 11.1 sec;   INR: 0.92 ratio         PTT - ( 2021 23:15 )  PTT:31.5 sec      Urinalysis Basic - ( 2021 17:16 )    Color: Colorless / Appearance: Clear / S.017 / pH: x  Gluc: x / Ketone: Negative  / Bili: Negative / Urobili: Negative   Blood: x / Protein: Trace / Nitrite: Negative   Leuk Esterase: Small / RBC: 141 /hpf / WBC 7 /HPF   Sq Epi: x / Non Sq Epi: 4 /hpf / Bacteria: Few      cc< from: CT Head No Cont (21 @ 03:27) >    IMPRESSION:  Unremarkable brain.    No acute intercranial hemorrhage, mass effect, or midline shift.    < end of copied text >      RADIOLOGY & ADDITIONAL TESTS:    Imaging Personally Reviewed: CT head report.   Consultant(s) Notes Reviewed:  Heme   Care Discussed with Consultants/Other Providers:

## 2021-04-19 NOTE — PROGRESS NOTE ADULT - ASSESSMENT
20 y/o F w/ a PMHx of ITP (on prednisone), asthma, and eczema who p/w bruising and low platelets on outpatient labs, likely 2/2 ITP, and was admitted to Medicine for further management. Hematology consulted for further evaluation.    # Thrombocytopenia  - Likely 2/2 underlying ITP. Of note, pt's prednisone was recently tapered as an outpatient (pt had been taking 2.5mg QD PTA)   - PLT count on admission = 25k (prior = 93k on 4/7). WBC and Hgb are WNL  - Head CT (4/18): No acute intercranial hemorrhage, mass effect, or midline shift.  - Peripheral smear reviewed: Normochromic RBCs, Rare schistocyte, True thrombocytopenia, No platelet clumping seen, Multiple large platelets, PMNs seen w/o overt dysplasia  - c/w Prednisone 1mg/kg daily. If platelets continue to improve tomorrow AM, no contraindication to heme discharge on same dose with plan for outpatient taper. Appt made for CBC check at [facility] 04/22  - Continue GI PPx while on high dose steroids  - Monitor CBC w/ diff daily and keep active T+S. Transfuse PLTs if there are any signs of bleeding  - Primary Hematologist: Dr. Linda Urrutia. Continue outpatient follow-up    Discussed with patient and mother      Singh Lo MD  Hematology/Oncology Fellow  
21F with hx of ITP, asthma, eczema with recently lowered prednisone dosing for ITP, p/w spontaneous bruising found with acute thrombocytopenia to 25, admitted for further treatment.

## 2021-04-19 NOTE — PROGRESS NOTE ADULT - PROBLEM SELECTOR PLAN 1
-worsening thrombocytopenia in setting of known ITP with recently lowered prednisone dosing to 2.5mg, exam with RUE ecchymoses, labs showing plt 25 on admission. Hb relatively stable   - hem/onc recs appreciated; c/w 100mg prednisone (1mg/kg) and plan for outpatient taper if platelets remain improved/stable tomorrow.   - c/o headache but CT Head negative. -Tylenol PRN.  - c/w pepcid for GI protection while on steroids.  -Has outpatient appt with Dr. Urrutia on 4/26.

## 2021-04-19 NOTE — PROGRESS NOTE ADULT - PROBLEM SELECTOR PLAN 3
-SCDs for DVT PPx - no pharm VTE ppx 2/2 thrombocytopenia    4. Dispo: -Likely DC tomorrow if platelets remain improved/stable.

## 2021-04-20 ENCOUNTER — TRANSCRIPTION ENCOUNTER (OUTPATIENT)
Age: 21
End: 2021-04-20

## 2021-04-20 VITALS — WEIGHT: 238.98 LBS

## 2021-04-20 LAB
ALBUMIN SERPL ELPH-MCNC: 3.6 G/DL — SIGNIFICANT CHANGE UP (ref 3.3–5)
ALP SERPL-CCNC: 83 U/L — SIGNIFICANT CHANGE UP (ref 40–120)
ALT FLD-CCNC: 12 U/L — SIGNIFICANT CHANGE UP (ref 10–45)
ANION GAP SERPL CALC-SCNC: 13 MMOL/L — SIGNIFICANT CHANGE UP (ref 5–17)
AST SERPL-CCNC: 13 U/L — SIGNIFICANT CHANGE UP (ref 10–40)
BILIRUB SERPL-MCNC: <0.1 MG/DL — LOW (ref 0.2–1.2)
BUN SERPL-MCNC: 26 MG/DL — HIGH (ref 7–23)
CALCIUM SERPL-MCNC: 9.3 MG/DL — SIGNIFICANT CHANGE UP (ref 8.4–10.5)
CHLORIDE SERPL-SCNC: 105 MMOL/L — SIGNIFICANT CHANGE UP (ref 96–108)
CO2 SERPL-SCNC: 21 MMOL/L — LOW (ref 22–31)
CREAT SERPL-MCNC: 0.67 MG/DL — SIGNIFICANT CHANGE UP (ref 0.5–1.3)
GLUCOSE SERPL-MCNC: 103 MG/DL — HIGH (ref 70–99)
HCT VFR BLD CALC: 39.3 % — SIGNIFICANT CHANGE UP (ref 34.5–45)
HGB BLD-MCNC: 12.8 G/DL — SIGNIFICANT CHANGE UP (ref 11.5–15.5)
MCHC RBC-ENTMCNC: 27.3 PG — SIGNIFICANT CHANGE UP (ref 27–34)
MCHC RBC-ENTMCNC: 32.6 GM/DL — SIGNIFICANT CHANGE UP (ref 32–36)
MCV RBC AUTO: 83.8 FL — SIGNIFICANT CHANGE UP (ref 80–100)
NRBC # BLD: 0 /100 WBCS — SIGNIFICANT CHANGE UP (ref 0–0)
PLATELET # BLD AUTO: 86 K/UL — LOW (ref 150–400)
POTASSIUM SERPL-MCNC: 3.7 MMOL/L — SIGNIFICANT CHANGE UP (ref 3.5–5.3)
POTASSIUM SERPL-SCNC: 3.7 MMOL/L — SIGNIFICANT CHANGE UP (ref 3.5–5.3)
PROT SERPL-MCNC: 6.9 G/DL — SIGNIFICANT CHANGE UP (ref 6–8.3)
RBC # BLD: 4.69 M/UL — SIGNIFICANT CHANGE UP (ref 3.8–5.2)
RBC # FLD: 13.6 % — SIGNIFICANT CHANGE UP (ref 10.3–14.5)
SODIUM SERPL-SCNC: 139 MMOL/L — SIGNIFICANT CHANGE UP (ref 135–145)
WBC # BLD: 13.32 K/UL — HIGH (ref 3.8–10.5)
WBC # FLD AUTO: 13.32 K/UL — HIGH (ref 3.8–10.5)

## 2021-04-20 PROCEDURE — 80053 COMPREHEN METABOLIC PANEL: CPT

## 2021-04-20 PROCEDURE — 99285 EMERGENCY DEPT VISIT HI MDM: CPT

## 2021-04-20 PROCEDURE — G0378: CPT

## 2021-04-20 PROCEDURE — U0003: CPT

## 2021-04-20 PROCEDURE — 70450 CT HEAD/BRAIN W/O DYE: CPT

## 2021-04-20 PROCEDURE — 84100 ASSAY OF PHOSPHORUS: CPT

## 2021-04-20 PROCEDURE — 85610 PROTHROMBIN TIME: CPT

## 2021-04-20 PROCEDURE — 83735 ASSAY OF MAGNESIUM: CPT

## 2021-04-20 PROCEDURE — 81001 URINALYSIS AUTO W/SCOPE: CPT

## 2021-04-20 PROCEDURE — 71045 X-RAY EXAM CHEST 1 VIEW: CPT

## 2021-04-20 PROCEDURE — 85025 COMPLETE CBC W/AUTO DIFF WBC: CPT

## 2021-04-20 PROCEDURE — 99239 HOSP IP/OBS DSCHRG MGMT >30: CPT

## 2021-04-20 PROCEDURE — 80048 BASIC METABOLIC PNL TOTAL CA: CPT

## 2021-04-20 PROCEDURE — 85730 THROMBOPLASTIN TIME PARTIAL: CPT

## 2021-04-20 PROCEDURE — 86769 SARS-COV-2 COVID-19 ANTIBODY: CPT

## 2021-04-20 PROCEDURE — U0005: CPT

## 2021-04-20 RX ORDER — ACETAMINOPHEN 500 MG
2 TABLET ORAL
Qty: 0 | Refills: 0 | DISCHARGE
Start: 2021-04-20

## 2021-04-20 RX ORDER — IPRATROPIUM/ALBUTEROL SULFATE 18-103MCG
3 AEROSOL WITH ADAPTER (GRAM) INHALATION
Qty: 0 | Refills: 0 | DISCHARGE
Start: 2021-04-20

## 2021-04-20 RX ORDER — FAMOTIDINE 10 MG/ML
1 INJECTION INTRAVENOUS
Qty: 30 | Refills: 0
Start: 2021-04-20 | End: 2021-05-19

## 2021-04-20 RX ADMIN — Medication 100 MILLIGRAM(S): at 07:57

## 2021-04-20 RX ADMIN — FAMOTIDINE 20 MILLIGRAM(S): 10 INJECTION INTRAVENOUS at 11:31

## 2021-04-20 NOTE — DIETITIAN INITIAL EVALUATION ADULT. - PERTINENT MEDS FT
MEDICATIONS  (STANDING):  famotidine    Tablet 20 milliGRAM(s) Oral daily  predniSONE   Tablet 100 milliGRAM(s) Oral daily    MEDICATIONS  (PRN):  acetaminophen   Tablet .. 650 milliGRAM(s) Oral every 6 hours PRN Temp greater or equal to 38C (100.4F), Mild Pain (1 - 3)  albuterol/ipratropium for Nebulization 3 milliLiter(s) Nebulizer every 6 hours PRN Shortness of Breath and/or Wheezing

## 2021-04-20 NOTE — DISCHARGE NOTE PROVIDER - NSDCCPCAREPLAN_GEN_ALL_CORE_FT
PRINCIPAL DISCHARGE DIAGNOSIS  Diagnosis: Thrombocytopenia  Assessment and Plan of Treatment: Take prenisone as prescribed.  Consult your doctor if you experience unusual bleeding or bruising.  --  Please keep appointment for bloodwork (CBC) to check platelets on Thursday 4/22.  Please keep appointment to follow-up with Dr. Urrutia on 4/26      SECONDARY DISCHARGE DIAGNOSES  Diagnosis: Asthma  Assessment and Plan of Treatment: Avoid environmental allergens and asthma triggers.  Participate in physical activity as tolerated.  Seek immediate medical attention if the shortness of breath does not improve with asthma treatments, or if you experience chest pain or palpitations.

## 2021-04-20 NOTE — DISCHARGE NOTE PROVIDER - PROVIDER TOKENS
PROVIDER:[TOKEN:[[id number]],FOLLOWUP:[1 week]],PROVIDER:[TOKEN:[[id number]],FOLLOWUP:[1 week]]

## 2021-04-20 NOTE — DISCHARGE NOTE PROVIDER - CARE PROVIDER_API CALL
Linda Urrutia (DO)  Hematology; Medical Oncology  [address] [place] [address]  Phone: [phone]  Fax: [phone]  Follow Up Time: 1 week    Sergio Khan (DO)  [address] [facility]  [address] [place] [address]  Phone: [phone]  Fax: [phone]  Follow Up Time: 1 week

## 2021-04-20 NOTE — DISCHARGE NOTE NURSING/CASE MANAGEMENT/SOCIAL WORK - PATIENT PORTAL LINK FT
You can access the FollowMyHealth Patient Portal offered by [facility] by registering at the following website: [email or web address]. By joining [facility]’s FollowMyHealth portal, you will also be able to view your health information using other applications (apps) compatible with our system.

## 2021-04-20 NOTE — DISCHARGE NOTE PROVIDER - NSDCMRMEDTOKEN_GEN_ALL_CORE_FT
acetaminophen 325 mg oral tablet: 2 tab(s) orally every 6 hours, As needed,  Mild Pain (1 - 3)  famotidine 20 mg oral tablet: 1 tab(s) orally once a day  ipratropium-albuterol 0.5 mg-2.5 mg/3 mLinhalation solution: 3 milliliter(s) inhaled every 6 hours, As needed, Shortness of Breath and/or Wheezing  predniSONE 20 mg oral tablet: 5 tab(s) orally once a day

## 2021-04-20 NOTE — DISCHARGE NOTE PROVIDER - NSDCFUADDINST_GEN_ALL_CORE_FT
- Keep appointment for repeat CBC on 4/22 - call the office to confirm time.  - Keep follow-up appointment with Dr. Urrutia on 4/26 - call the office to confirm time.  - Follow up with Dr. Khan within 1-2 weeks.

## 2021-04-20 NOTE — CHART NOTE - NSCHARTNOTEFT_GEN_A_CORE
Patient seen and examined at bedside.  No urinary symptoms.  No signs of bleeding reported by patient.  No pain.  No acute complaints.  12 pt ros negative.    T(C): 36.5 (04-20-21 @ 05:51), Max: 37.2 (04-19-21 @ 21:04)  T(F): 97.7 (04-20-21 @ 05:51), Max: 98.9 (04-19-21 @ 21:04)  HR: 81 (04-20-21 @ 05:51) (81 - 86)  BP: 109/78 (04-20-21 @ 05:51) (109/78 - 121/66)  ABP: --  ABP(mean): --  RR: 18 (04-20-21 @ 05:51) (18 - 18)  SpO2: 99% (04-20-21 @ 05:51) (96% - 99%)    CONSTITUTIONAL: No acute distress.   HEENT:  Conjunctiva clear B/L.  Moist oral mucosa.   Cardiovascular: RRR with no murmurs. No JVD noted. No lower extremity edema B/L. Extremities are warm and well perfused. Radial pulses 2+ B/L. Dorsalis pedis pulses 2+ B/L.    Respiratory: Lungs CTAB. No wrr. No accessory muscle use.   Gastrointestinal:  Soft, nontender. Non-distended. Non-rigid. No CVA tenderness B/L.  MSK:  No joint swelling. No joint erythema B/L. No midline spinal tenderness.  Neurologic:  Alert and awake. Oriented x3. Moving all extremities. Following commands. Making eye contact.    Skin:  No rashes noted. No skin erythema noted.   Psych:  Normal affect. Normal Mood.     Labs and imaging reviewed.  Plts improved.   No s/s of blood loss.  Leukocytosis from steroids.   Patient cleared by heme consult for d/c on current dose of prednisone with outpt taper.   Patient is medically optimized for d/c.   D/c time noted in d/c summary.

## 2021-04-20 NOTE — DISCHARGE NOTE PROVIDER - CARE PROVIDERS DIRECT ADDRESSES
[email or web address]

## 2021-04-20 NOTE — DIETITIAN INITIAL EVALUATION ADULT. - OTHER INFO
Intake : >75%  Denies nausea/vomit :  Denies difficulty chewing /swallow :  Denies diarrhea/constipation:  Last BM : yesterday  NKFA  IBW +/- 10%= 110pounds  Ht: 62"  Ht taken from pt  Dosing ht: 160cm  Usual Weight PTA: 239pounds when on steroids  Dosing wt: 104.3kg  BMI: 41.9  BMI calculated using wt from dosing  BMI calculated using ht from pt  wt used to calculate needs: IBW+10%  Education Provided : Weight Loss Tips  pressure injury: none  edema: none

## 2021-04-20 NOTE — DIETITIAN INITIAL EVALUATION ADULT. - ORAL INTAKE PTA/DIET HISTORY
skips breakfast, soup, chicken for lunch. spaghetti and chicken for dinner. snacks on chips and yogurt. does not follow any special diet PTA.

## 2021-04-20 NOTE — DISCHARGE NOTE PROVIDER - HOSPITAL COURSE
21 year-old with PMH of ITP, asthma, eczema with recently lowered prednisone dosing for ITP, p/w spontaneous bruising found with acute thrombocytopenia to 25, admitted for further treatment.       Problem: Acute idiopathic thrombocytopenic purpura.    -worsening thrombocytopenia in setting of known ITP with recently lowered prednisone dosing to 2.5mg, exam with RUE ecchymoses, labs showing plt 25 on admission. Hb relatively stable.   - c/o headache but CT Head negative. -Tylenol PRN.  -hem/onc recs appreciated; treated with 100mg prednisone (1mg/kg) and plan for outpatient taper   - Latest platelet count 86  - c/w pepcid for GI protection while on steroids.  -Has appointment for CBC check at [facility] 04/22 and follow-up appointment with Dr. Urrutia on 4/26.     Problem: Asthma.  Plan: no SOB, sat well on RA  -duoneb prn.     Patient cleared by IM and Hematology for discharge. 21 year-old with PMH of ITP, asthma, eczema with recently lowered prednisone dosing for ITP, p/w spontaneous bruising found with acute thrombocytopenia to 25, admitted for further treatment.       Problem: Acute idiopathic thrombocytopenic purpura.    -worsening thrombocytopenia in setting of known ITP with recently lowered prednisone dosing to 2.5mg, exam with RUE ecchymoses, labs showing plt 25 on admission. Hb relatively stable.   - c/o headache but CT Head negative. -Tylenol PRN.  -hem/onc recs appreciated; treated with 100mg prednisone (1mg/kg) and plan for outpatient taper   - Latest platelet count 86  - c/w pepcid for GI protection while on steroids.  -Has appointment for CBC check at [facility] 04/22 and follow-up appointment with Dr. Urrutia on 4/26.     Problem: Asthma.  Plan: no SOB, sat well on RA  -duoneb prn.     Patient cleared by heme consult for d/c on current dose of prednisone with outpt taper.   Patient is medically optimized for d/c.   D/c time spent: 60 minutes.

## 2021-04-20 NOTE — DIETITIAN INITIAL EVALUATION ADULT. - PROBLEM SELECTOR PLAN 1
worsening thrombocytopenia in setting of known ITP with recently lowered prednisone dosing to 2.5mg, exam with RUE ecchymoses, labs showing plt 25. Pt denies any other bleeding, Hb stable   - hem/onc called in ED, pt s/p 100mg prednisone (1mg/kg) per recs   - will c/t follow CBC closely - further treatment recs (ie IVIG, rituxan, further steroids) per heme in AM   - overall low suspicion, but given c/o headache and low plt count, will get CTH  - check UA, CXR to r/o infectious etiology; f/u COVID  - c/w pepcid for GI protection

## 2021-04-26 ENCOUNTER — RESULT REVIEW (OUTPATIENT)
Age: 21
End: 2021-04-26

## 2021-04-26 ENCOUNTER — APPOINTMENT (OUTPATIENT)
Dept: HEMATOLOGY ONCOLOGY | Facility: CLINIC | Age: 21
End: 2021-04-26
Payer: COMMERCIAL

## 2021-04-26 VITALS
OXYGEN SATURATION: 96 % | BODY MASS INDEX: 45.27 KG/M2 | WEIGHT: 246.01 LBS | HEIGHT: 62 IN | DIASTOLIC BLOOD PRESSURE: 91 MMHG | SYSTOLIC BLOOD PRESSURE: 141 MMHG | TEMPERATURE: 96.9 F | HEART RATE: 92 BPM | RESPIRATION RATE: 16 BRPM

## 2021-04-26 DIAGNOSIS — Z83.49 FAMILY HISTORY OF OTHER ENDOCRINE, NUTRITIONAL AND METABOLIC DISEASES: ICD-10-CM

## 2021-04-26 DIAGNOSIS — R79.89 OTHER SPECIFIED ABNORMAL FINDINGS OF BLOOD CHEMISTRY: ICD-10-CM

## 2021-04-26 LAB
BASOPHILS # BLD AUTO: 0.05 K/UL — SIGNIFICANT CHANGE UP (ref 0–0.2)
BASOPHILS NFR BLD AUTO: 0.3 % — SIGNIFICANT CHANGE UP (ref 0–2)
EOSINOPHIL # BLD AUTO: 0.01 K/UL — SIGNIFICANT CHANGE UP (ref 0–0.5)
EOSINOPHIL NFR BLD AUTO: 0.1 % — SIGNIFICANT CHANGE UP (ref 0–6)
HCT VFR BLD CALC: 39.2 % — SIGNIFICANT CHANGE UP (ref 34.5–45)
HGB BLD-MCNC: 13 G/DL — SIGNIFICANT CHANGE UP (ref 11.5–15.5)
IMM GRANULOCYTES NFR BLD AUTO: 1.7 % — HIGH (ref 0–1.5)
LYMPHOCYTES # BLD AUTO: 22 % — SIGNIFICANT CHANGE UP (ref 13–44)
LYMPHOCYTES # BLD AUTO: 3.32 K/UL — HIGH (ref 1–3.3)
MCHC RBC-ENTMCNC: 28 PG — SIGNIFICANT CHANGE UP (ref 27–34)
MCHC RBC-ENTMCNC: 33.2 G/DL — SIGNIFICANT CHANGE UP (ref 32–36)
MCV RBC AUTO: 84.5 FL — SIGNIFICANT CHANGE UP (ref 80–100)
MONOCYTES # BLD AUTO: 0.58 K/UL — SIGNIFICANT CHANGE UP (ref 0–0.9)
MONOCYTES NFR BLD AUTO: 3.8 % — SIGNIFICANT CHANGE UP (ref 2–14)
NEUTROPHILS # BLD AUTO: 10.9 K/UL — HIGH (ref 1.8–7.4)
NEUTROPHILS NFR BLD AUTO: 72.1 % — SIGNIFICANT CHANGE UP (ref 43–77)
NRBC # BLD: 0 /100 WBCS — SIGNIFICANT CHANGE UP (ref 0–0)
PLATELET # BLD AUTO: 257 K/UL — SIGNIFICANT CHANGE UP (ref 150–400)
RBC # BLD: 4.64 M/UL — SIGNIFICANT CHANGE UP (ref 3.8–5.2)
RBC # FLD: 13.6 % — SIGNIFICANT CHANGE UP (ref 10.3–14.5)
WBC # BLD: 15.11 K/UL — HIGH (ref 3.8–10.5)
WBC # FLD AUTO: 15.11 K/UL — HIGH (ref 3.8–10.5)

## 2021-04-26 PROCEDURE — 99072 ADDL SUPL MATRL&STAF TM PHE: CPT

## 2021-04-26 PROCEDURE — 99214 OFFICE O/P EST MOD 30 MIN: CPT

## 2021-04-26 RX ORDER — DEXAMETHASONE 4 MG/1
4 TABLET ORAL DAILY
Qty: 40 | Refills: 0 | Status: DISCONTINUED | COMMUNITY
Start: 2020-09-10 | End: 2021-04-26

## 2021-04-26 NOTE — REVIEW OF SYSTEMS
[Negative] : Allergic/Immunologic [Fever] : no fever [Chills] : no chills [Night Sweats] : no night sweats [Fatigue] : no fatigue [Recent Change In Weight] : ~T recent weight change [FreeTextEntry2] : +7 Ibs

## 2021-04-26 NOTE — PHYSICAL EXAM
[Restricted in physically strenuous activity but ambulatory and able to carry out work of a light or sedentary nature] : Status 1- Restricted in physically strenuous activity but ambulatory and able to carry out work of a light or sedentary nature, e.g., light house work, office work [Obese] : obese [Normal] : affect appropriate [de-identified] : Trace edema bilaterally around ankles

## 2021-04-26 NOTE — ASSESSMENT
[FreeTextEntry1] : This is a 21 year old female with ITP.  Initially diagnosed at [facility] in the end of April- no records.  She was treated with cefdinir/zithromax for a RLL pneumonia as well as IVIG/plt transfusion. \par No steroids given per patient. \par Hospitalized at [facility] (4/18 - 4/20) for second episode of relapsed ITP on Prednisone 2.5mg daily.\par \par She has intermittent relapses off the steroids. \par On Prednisone 80mg daily with famotidine. Taper by 10mg weekly with CBCs. Will be tapering to 70mg daily starting tomorrow.\par Goal is to maintain platelets >30.\par Given multiple episodes of relapse, will treat with Rituxan and have her tapered off of Prednisone. Drug information provided, reviewed side effect, benefits, and risks. She will review the medication further and will make a decision at next office visit. She is concerned that Rituxan may affect her fertility, reassured that it will not negatively impact her fertility. \par Patient is aware of signs/symptoms of relapse, she will call or come if there are any new symptoms. \par \par Follow up in 1 month. \par Case and management discussed with Dr. Urrutia

## 2021-04-26 NOTE — HISTORY OF PRESENT ILLNESS
[de-identified] : ITP\par \par This is a 20 year old female with a history of eczema/asthma who is here for an evaluation of thrombocytopenia.  She was admitted to [facility] for 3 days at the end of April for ITP.  She noticed petechiae on her chest wall, leg, mouth bleeding, longer menstrual cycle (9 days instead of 4- not more heavy).  She was found to have low plt, unknown level.  She was given a platelet transfusion followed by IVIG.  She was seen by hematology, unknown who.  She was not started on any steroids.  She was discharged, called her PCP, Dr. Pollack the following day for recurrent oral bleeding.  She was seen at [facility], plt at that time was 181,000 on 4/27.  She was seen by Dr. Pollack yesterday, plt were found to be 32, repeated later 26.  During the hospitalization, she was told she had a RLL pneumonia and was treated with cefdinir and azithromycin.   [de-identified] : Patient presents for follow up appointment. In the interim, while on Prednisone 2.5mg daily, she was admitted to [facility] (4/18 - 4/20) for relapsed ITP, presented with RUE ecchymoses. She was started on high dose steroids which are now being tapered, and is currently taking Prednisone 80mg daily. Today she feels well and does not have any complaints. Patient denies melena, BRBPR, fever, chills, night sweats, back pain, headache, abdominal pain, chest pain, or shortness of breath. Good appetite, weight gain (+7 Ibs) due to steroids.\par

## 2021-05-07 ENCOUNTER — OUTPATIENT (OUTPATIENT)
Dept: OUTPATIENT SERVICES | Facility: HOSPITAL | Age: 21
LOS: 1 days | Discharge: HOME | End: 2021-05-07

## 2021-05-07 DIAGNOSIS — D69.3 IMMUNE THROMBOCYTOPENIC PURPURA: ICD-10-CM

## 2021-05-10 ENCOUNTER — APPOINTMENT (OUTPATIENT)
Dept: HEMATOLOGY ONCOLOGY | Facility: CLINIC | Age: 21
End: 2021-05-10

## 2021-05-19 ENCOUNTER — APPOINTMENT (OUTPATIENT)
Dept: INTERNAL MEDICINE | Facility: CLINIC | Age: 21
End: 2021-05-19

## 2021-05-19 ENCOUNTER — APPOINTMENT (OUTPATIENT)
Dept: HEMATOLOGY ONCOLOGY | Facility: CLINIC | Age: 21
End: 2021-05-19
Payer: COMMERCIAL

## 2021-05-19 ENCOUNTER — RESULT REVIEW (OUTPATIENT)
Age: 21
End: 2021-05-19

## 2021-05-19 VITALS
DIASTOLIC BLOOD PRESSURE: 85 MMHG | RESPIRATION RATE: 17 BRPM | HEART RATE: 99 BPM | BODY MASS INDEX: 45.03 KG/M2 | WEIGHT: 244.71 LBS | OXYGEN SATURATION: 98 % | SYSTOLIC BLOOD PRESSURE: 121 MMHG | TEMPERATURE: 97.2 F | HEIGHT: 62 IN

## 2021-05-19 LAB
BASOPHILS # BLD AUTO: 0.02 K/UL — SIGNIFICANT CHANGE UP (ref 0–0.2)
BASOPHILS NFR BLD AUTO: 0.2 % — SIGNIFICANT CHANGE UP (ref 0–2)
EOSINOPHIL # BLD AUTO: 0 K/UL — SIGNIFICANT CHANGE UP (ref 0–0.5)
EOSINOPHIL NFR BLD AUTO: 0 % — SIGNIFICANT CHANGE UP (ref 0–6)
HCT VFR BLD CALC: 40.2 % — SIGNIFICANT CHANGE UP (ref 34.5–45)
HGB BLD-MCNC: 13.2 G/DL — SIGNIFICANT CHANGE UP (ref 11.5–15.5)
IMM GRANULOCYTES NFR BLD AUTO: 0.8 % — SIGNIFICANT CHANGE UP (ref 0–1.5)
LYMPHOCYTES # BLD AUTO: 1.6 K/UL — SIGNIFICANT CHANGE UP (ref 1–3.3)
LYMPHOCYTES # BLD AUTO: 12.3 % — LOW (ref 13–44)
MCHC RBC-ENTMCNC: 27.9 PG — SIGNIFICANT CHANGE UP (ref 27–34)
MCHC RBC-ENTMCNC: 32.8 G/DL — SIGNIFICANT CHANGE UP (ref 32–36)
MCV RBC AUTO: 85 FL — SIGNIFICANT CHANGE UP (ref 80–100)
MONOCYTES # BLD AUTO: 0.21 K/UL — SIGNIFICANT CHANGE UP (ref 0–0.9)
MONOCYTES NFR BLD AUTO: 1.6 % — LOW (ref 2–14)
NEUTROPHILS # BLD AUTO: 11.1 K/UL — HIGH (ref 1.8–7.4)
NEUTROPHILS NFR BLD AUTO: 85.1 % — HIGH (ref 43–77)
NRBC # BLD: 0 /100 WBCS — SIGNIFICANT CHANGE UP (ref 0–0)
PLATELET # BLD AUTO: 266 K/UL — SIGNIFICANT CHANGE UP (ref 150–400)
RBC # BLD: 4.73 M/UL — SIGNIFICANT CHANGE UP (ref 3.8–5.2)
RBC # FLD: 13.1 % — SIGNIFICANT CHANGE UP (ref 10.3–14.5)
WBC # BLD: 13.04 K/UL — HIGH (ref 3.8–10.5)
WBC # FLD AUTO: 13.04 K/UL — HIGH (ref 3.8–10.5)

## 2021-05-19 PROCEDURE — 99213 OFFICE O/P EST LOW 20 MIN: CPT

## 2021-05-19 PROCEDURE — 99072 ADDL SUPL MATRL&STAF TM PHE: CPT

## 2021-05-26 NOTE — HISTORY OF PRESENT ILLNESS
[de-identified] : ITP\par \par This is a 20 year old female with a history of eczema/asthma who is here for an evaluation of thrombocytopenia.  She was admitted to [facility] for 3 days at the end of April for ITP.  She noticed petechiae on her chest wall, leg, mouth bleeding, longer menstrual cycle (9 days instead of 4- not more heavy).  She was found to have low plt, unknown level.  She was given a platelet transfusion followed by IVIG.  She was seen by hematology, unknown who.  She was not started on any steroids.  She was discharged, called her PCP, Dr. Pollack the following day for recurrent oral bleeding.  She was seen at [facility], plt at that time was 181,000 on 4/27.  She was seen by Dr. Pollack yesterday, plt were found to be 32, repeated later 26.  During the hospitalization, she was told she had a RLL pneumonia and was treated with cefdinir and azithromycin.   [de-identified] : Patient presents for follow up appointment.  She is currently on prednisone 7.5 mg a day.  She dislikes being on the steroids due weight gain, emotional outbursts.  She has no bleeding/bruising.  She has no chest pain, no SOB, no abdominal pain, no n/v/d, no fever/chills. \par

## 2021-05-26 NOTE — ASSESSMENT
[FreeTextEntry1] : This is a 21 year old female with ITP.  Initially diagnosed at [facility] in the end of April- no records.  She was treated with cefdinir/zithromax for a RLL pneumonia as well as IVIG/plt transfusion. \par No steroids given per patient. \par Hospitalized at [facility] (4/18 - 4/20) for second episode of relapsed ITP on Prednisone 2.5mg daily.\par \par She has intermittent relapses off the steroids. \par Her plt are normal today.  Recommend to taper to 5 mg prednisone tomorrow.  Repeat her CBC weekly. \par Goal is to maintain platelets >30.\par Given multiple episodes of relapse, side effects of steroids, plan to treat with rituxan.  Risks/benefits discussed with the patient and her mother over the phone. \par Plan on COVID vaccination this month.  Once vaccinated, will schedule treatment.  \par Patient is aware of signs/symptoms of relapse, she will call or come if there are any new symptoms. \par Follow up in 1 month. \par

## 2021-05-26 NOTE — REVIEW OF SYSTEMS
[Recent Change In Weight] : ~T recent weight change [Negative] : Allergic/Immunologic [Fever] : no fever [Chills] : no chills [Night Sweats] : no night sweats [Fatigue] : no fatigue [FreeTextEntry2] : weight gain

## 2021-05-26 NOTE — PHYSICAL EXAM
[Restricted in physically strenuous activity but ambulatory and able to carry out work of a light or sedentary nature] : Status 1- Restricted in physically strenuous activity but ambulatory and able to carry out work of a light or sedentary nature, e.g., light house work, office work [Obese] : obese [Normal] : affect appropriate [de-identified] : Trace edema bilaterally around ankles

## 2021-06-21 ENCOUNTER — OUTPATIENT (OUTPATIENT)
Dept: OUTPATIENT SERVICES | Facility: HOSPITAL | Age: 21
LOS: 1 days | Discharge: HOME | End: 2021-06-21

## 2021-06-21 DIAGNOSIS — D69.3 IMMUNE THROMBOCYTOPENIC PURPURA: ICD-10-CM

## 2021-06-24 ENCOUNTER — RESULT REVIEW (OUTPATIENT)
Age: 21
End: 2021-06-24

## 2021-06-24 ENCOUNTER — APPOINTMENT (OUTPATIENT)
Dept: HEMATOLOGY ONCOLOGY | Facility: CLINIC | Age: 21
End: 2021-06-24
Payer: COMMERCIAL

## 2021-06-24 VITALS
HEART RATE: 107 BPM | BODY MASS INDEX: 44.46 KG/M2 | HEIGHT: 62.01 IN | OXYGEN SATURATION: 97 % | DIASTOLIC BLOOD PRESSURE: 83 MMHG | SYSTOLIC BLOOD PRESSURE: 127 MMHG | WEIGHT: 244.71 LBS | RESPIRATION RATE: 16 BRPM | TEMPERATURE: 97.3 F

## 2021-06-24 LAB
BASOPHILS # BLD AUTO: 0.04 K/UL — SIGNIFICANT CHANGE UP (ref 0–0.2)
BASOPHILS NFR BLD AUTO: 0.4 % — SIGNIFICANT CHANGE UP (ref 0–2)
EOSINOPHIL # BLD AUTO: 0.05 K/UL — SIGNIFICANT CHANGE UP (ref 0–0.5)
EOSINOPHIL NFR BLD AUTO: 0.5 % — SIGNIFICANT CHANGE UP (ref 0–6)
HCT VFR BLD CALC: 40.2 % — SIGNIFICANT CHANGE UP (ref 34.5–45)
HGB BLD-MCNC: 13.6 G/DL — SIGNIFICANT CHANGE UP (ref 11.5–15.5)
IMM GRANULOCYTES NFR BLD AUTO: 0.6 % — SIGNIFICANT CHANGE UP (ref 0–1.5)
LYMPHOCYTES # BLD AUTO: 3.7 K/UL — HIGH (ref 1–3.3)
LYMPHOCYTES # BLD AUTO: 37.9 % — SIGNIFICANT CHANGE UP (ref 13–44)
MCHC RBC-ENTMCNC: 27.6 PG — SIGNIFICANT CHANGE UP (ref 27–34)
MCHC RBC-ENTMCNC: 33.8 G/DL — SIGNIFICANT CHANGE UP (ref 32–36)
MCV RBC AUTO: 81.7 FL — SIGNIFICANT CHANGE UP (ref 80–100)
MONOCYTES # BLD AUTO: 0.75 K/UL — SIGNIFICANT CHANGE UP (ref 0–0.9)
MONOCYTES NFR BLD AUTO: 7.7 % — SIGNIFICANT CHANGE UP (ref 2–14)
NEUTROPHILS # BLD AUTO: 5.15 K/UL — SIGNIFICANT CHANGE UP (ref 1.8–7.4)
NEUTROPHILS NFR BLD AUTO: 52.9 % — SIGNIFICANT CHANGE UP (ref 43–77)
NRBC # BLD: 0 /100 WBCS — SIGNIFICANT CHANGE UP (ref 0–0)
PLATELET # BLD AUTO: 306 K/UL — SIGNIFICANT CHANGE UP (ref 150–400)
RBC # BLD: 4.92 M/UL — SIGNIFICANT CHANGE UP (ref 3.8–5.2)
RBC # FLD: 13.1 % — SIGNIFICANT CHANGE UP (ref 10.3–14.5)
WBC # BLD: 9.75 K/UL — SIGNIFICANT CHANGE UP (ref 3.8–10.5)
WBC # FLD AUTO: 9.75 K/UL — SIGNIFICANT CHANGE UP (ref 3.8–10.5)

## 2021-06-24 PROCEDURE — 99213 OFFICE O/P EST LOW 20 MIN: CPT

## 2021-06-24 PROCEDURE — 99072 ADDL SUPL MATRL&STAF TM PHE: CPT

## 2021-06-28 NOTE — ASSESSMENT
[FreeTextEntry1] : This is a 21 year old female with ITP.  Initially diagnosed at [facility] in the end of April- no records.  She was treated with cefdinir/zithromax for a RLL pneumonia as well as IVIG/plt transfusion. \par No steroids given per patient. \par Hospitalized at [facility] (4/18 - 4/20) for second episode of relapsed ITP on Prednisone 2.5mg daily.\par \par She has intermittent relapses off the steroids. \par Her plt are normal today.  \par She has been tapering the steroids wrong, ?now back on 20 mg.  Educated her to taper to 10 mg starting today then decrease to 5 mg after.  She will repeat her CBC in 2 weeks. \par Goal is to maintain platelets >30.\par Given multiple episodes of relapse, side effects of steroids, plan to treat with rituxan.  Risks/benefits discussed with the patient and her mother over the phone. \par She has started the COVID vaccine, second dose next week.  Would await 4-6 weeks before starting treatment with the rituxan.  \par Patient is aware of signs/symptoms of relapse, she will call or come if there are any new symptoms. \par Follow up in 1 month. \par Discussed that I am leaving the practice, she will follow up with one of my colleagues.  \par

## 2021-06-28 NOTE — PHYSICAL EXAM
[Restricted in physically strenuous activity but ambulatory and able to carry out work of a light or sedentary nature] : Status 1- Restricted in physically strenuous activity but ambulatory and able to carry out work of a light or sedentary nature, e.g., light house work, office work [Obese] : obese [Normal] : affect appropriate [de-identified] : Trace edema bilaterally around ankles

## 2021-06-28 NOTE — HISTORY OF PRESENT ILLNESS
[de-identified] : ITP\par \par This is a 20 year old female with a history of eczema/asthma who is here for an evaluation of thrombocytopenia.  She was admitted to [facility] for 3 days at the end of April for ITP.  She noticed petechiae on her chest wall, leg, mouth bleeding, longer menstrual cycle (9 days instead of 4- not more heavy).  She was found to have low plt, unknown level.  She was given a platelet transfusion followed by IVIG.  She was seen by hematology, unknown who.  She was not started on any steroids.  She was discharged, called her PCP, Dr. Pollack the following day for recurrent oral bleeding.  She was seen at [facility], plt at that time was 181,000 on 4/27.  She was seen by Dr. Pollack yesterday, plt were found to be 32, repeated later 26.  During the hospitalization, she was told she had a RLL pneumonia and was treated with cefdinir and azithromycin.   [de-identified] : Patient presents for follow up appointment.  She has no new complaints.  She denies any bleeding.  She is uncertain what dose of prednisone she is on, ?now on 20 mg but uncertain.  She is emotional on the steroids.  She has received the first dose of the COVID vaccine.  She has no chest pain, no SOB, no abdominal pain, no n/v/d, no fever/chills. \par

## 2021-06-28 NOTE — REVIEW OF SYSTEMS
[Fever] : no fever [Chills] : no chills [Night Sweats] : no night sweats [Fatigue] : no fatigue [Recent Change In Weight] : ~T no recent weight change [Negative] : Allergic/Immunologic [FreeTextEntry2] : weight gain

## 2021-07-07 ENCOUNTER — RESULT REVIEW (OUTPATIENT)
Age: 21
End: 2021-07-07

## 2021-07-07 ENCOUNTER — APPOINTMENT (OUTPATIENT)
Dept: HEMATOLOGY ONCOLOGY | Facility: CLINIC | Age: 21
End: 2021-07-07

## 2021-07-07 LAB
BASOPHILS # BLD AUTO: 0.04 K/UL — SIGNIFICANT CHANGE UP (ref 0–0.2)
BASOPHILS NFR BLD AUTO: 0.5 % — SIGNIFICANT CHANGE UP (ref 0–2)
EOSINOPHIL # BLD AUTO: 0.05 K/UL — SIGNIFICANT CHANGE UP (ref 0–0.5)
EOSINOPHIL NFR BLD AUTO: 0.6 % — SIGNIFICANT CHANGE UP (ref 0–6)
HCT VFR BLD CALC: 38.7 % — SIGNIFICANT CHANGE UP (ref 34.5–45)
HGB BLD-MCNC: 12.9 G/DL — SIGNIFICANT CHANGE UP (ref 11.5–15.5)
IMM GRANULOCYTES NFR BLD AUTO: 0.3 % — SIGNIFICANT CHANGE UP (ref 0–1.5)
LYMPHOCYTES # BLD AUTO: 1.78 K/UL — SIGNIFICANT CHANGE UP (ref 1–3.3)
LYMPHOCYTES # BLD AUTO: 23.1 % — SIGNIFICANT CHANGE UP (ref 13–44)
MCHC RBC-ENTMCNC: 27.9 PG — SIGNIFICANT CHANGE UP (ref 27–34)
MCHC RBC-ENTMCNC: 33.3 G/DL — SIGNIFICANT CHANGE UP (ref 32–36)
MCV RBC AUTO: 83.6 FL — SIGNIFICANT CHANGE UP (ref 80–100)
MONOCYTES # BLD AUTO: 0.5 K/UL — SIGNIFICANT CHANGE UP (ref 0–0.9)
MONOCYTES NFR BLD AUTO: 6.5 % — SIGNIFICANT CHANGE UP (ref 2–14)
NEUTROPHILS # BLD AUTO: 5.31 K/UL — SIGNIFICANT CHANGE UP (ref 1.8–7.4)
NEUTROPHILS NFR BLD AUTO: 69 % — SIGNIFICANT CHANGE UP (ref 43–77)
NRBC # BLD: 0 /100 WBCS — SIGNIFICANT CHANGE UP (ref 0–0)
PLATELET # BLD AUTO: 278 K/UL — SIGNIFICANT CHANGE UP (ref 150–400)
RBC # BLD: 4.63 M/UL — SIGNIFICANT CHANGE UP (ref 3.8–5.2)
RBC # FLD: 13.1 % — SIGNIFICANT CHANGE UP (ref 10.3–14.5)
WBC # BLD: 7.7 K/UL — SIGNIFICANT CHANGE UP (ref 3.8–10.5)
WBC # FLD AUTO: 7.7 K/UL — SIGNIFICANT CHANGE UP (ref 3.8–10.5)

## 2021-07-08 ENCOUNTER — APPOINTMENT (OUTPATIENT)
Dept: HEMATOLOGY ONCOLOGY | Facility: CLINIC | Age: 21
End: 2021-07-08

## 2021-07-20 LAB
ALBUMIN SERPL ELPH-MCNC: 4.2 G/DL
ALP BLD-CCNC: 95 U/L
ALT SERPL-CCNC: 13 U/L
ANION GAP SERPL CALC-SCNC: 13 MMOL/L
AST SERPL-CCNC: 13 U/L
BILIRUB SERPL-MCNC: <0.2 MG/DL
BUN SERPL-MCNC: 23 MG/DL
CALCIUM SERPL-MCNC: 9.3 MG/DL
CHLORIDE SERPL-SCNC: 107 MMOL/L
CO2 SERPL-SCNC: 23 MMOL/L
CREAT SERPL-MCNC: 0.71 MG/DL
GLUCOSE SERPL-MCNC: 112 MG/DL
POTASSIUM SERPL-SCNC: 3.7 MMOL/L
PROT SERPL-MCNC: 7.1 G/DL
SODIUM SERPL-SCNC: 143 MMOL/L

## 2021-08-11 ENCOUNTER — APPOINTMENT (OUTPATIENT)
Dept: INTERNAL MEDICINE | Facility: CLINIC | Age: 21
End: 2021-08-11
Payer: COMMERCIAL

## 2021-08-11 ENCOUNTER — NON-APPOINTMENT (OUTPATIENT)
Age: 21
End: 2021-08-11

## 2021-08-11 VITALS
TEMPERATURE: 97.8 F | HEART RATE: 99 BPM | DIASTOLIC BLOOD PRESSURE: 87 MMHG | HEIGHT: 62.01 IN | BODY MASS INDEX: 44.34 KG/M2 | WEIGHT: 244 LBS | OXYGEN SATURATION: 96 % | SYSTOLIC BLOOD PRESSURE: 140 MMHG

## 2021-08-11 VITALS — DIASTOLIC BLOOD PRESSURE: 84 MMHG | SYSTOLIC BLOOD PRESSURE: 128 MMHG

## 2021-08-11 DIAGNOSIS — J45.909 UNSPECIFIED ASTHMA, UNCOMPLICATED: ICD-10-CM

## 2021-08-11 DIAGNOSIS — Z23 ENCOUNTER FOR IMMUNIZATION: ICD-10-CM

## 2021-08-11 DIAGNOSIS — R45.89 OTHER SYMPTOMS AND SIGNS INVOLVING EMOTIONAL STATE: ICD-10-CM

## 2021-08-11 DIAGNOSIS — Z80.8 FAMILY HISTORY OF MALIGNANT NEOPLASM OF OTHER ORGANS OR SYSTEMS: ICD-10-CM

## 2021-08-11 PROCEDURE — 99385 PREV VISIT NEW AGE 18-39: CPT | Mod: 25

## 2021-08-11 PROCEDURE — 36415 COLL VENOUS BLD VENIPUNCTURE: CPT

## 2021-08-12 LAB
25(OH)D3 SERPL-MCNC: 19.6 NG/ML
ALBUMIN SERPL ELPH-MCNC: 4.2 G/DL
ALP BLD-CCNC: 81 U/L
ALT SERPL-CCNC: 11 U/L
ANION GAP SERPL CALC-SCNC: 10 MMOL/L
AST SERPL-CCNC: 15 U/L
BASOPHILS # BLD AUTO: 0.04 K/UL
BASOPHILS NFR BLD AUTO: 0.5 %
BILIRUB SERPL-MCNC: <0.2 MG/DL
BUN SERPL-MCNC: 15 MG/DL
C TRACH RRNA SPEC QL NAA+PROBE: NOT DETECTED
CALCIUM SERPL-MCNC: 9.5 MG/DL
CHLORIDE SERPL-SCNC: 107 MMOL/L
CHOLEST SERPL-MCNC: 185 MG/DL
CO2 SERPL-SCNC: 25 MMOL/L
CREAT SERPL-MCNC: 0.63 MG/DL
EOSINOPHIL # BLD AUTO: 0.14 K/UL
EOSINOPHIL NFR BLD AUTO: 1.7 %
ESTIMATED AVERAGE GLUCOSE: 120 MG/DL
GLUCOSE SERPL-MCNC: 103 MG/DL
HAV IGM SER QL: NONREACTIVE
HBA1C MFR BLD HPLC: 5.8 %
HBV CORE IGM SER QL: NONREACTIVE
HBV SURFACE AG SER QL: NONREACTIVE
HCT VFR BLD CALC: 38.8 %
HCV AB SER QL: NONREACTIVE
HCV S/CO RATIO: 0.12 S/CO
HDLC SERPL-MCNC: 99 MG/DL
HGB BLD-MCNC: 12.7 G/DL
HIV1+2 AB SPEC QL IA.RAPID: NONREACTIVE
IMM GRANULOCYTES NFR BLD AUTO: 0.2 %
LDLC SERPL CALC-MCNC: 78 MG/DL
LYMPHOCYTES # BLD AUTO: 2.32 K/UL
LYMPHOCYTES NFR BLD AUTO: 28.6 %
MAN DIFF?: NORMAL
MCHC RBC-ENTMCNC: 27.7 PG
MCHC RBC-ENTMCNC: 32.7 GM/DL
MCV RBC AUTO: 84.7 FL
MONOCYTES # BLD AUTO: 0.67 K/UL
MONOCYTES NFR BLD AUTO: 8.3 %
N GONORRHOEA RRNA SPEC QL NAA+PROBE: NOT DETECTED
NEUTROPHILS # BLD AUTO: 4.91 K/UL
NEUTROPHILS NFR BLD AUTO: 60.7 %
NONHDLC SERPL-MCNC: 86 MG/DL
PLATELET # BLD AUTO: 232 K/UL
POTASSIUM SERPL-SCNC: 4 MMOL/L
PROT SERPL-MCNC: 7 G/DL
RBC # BLD: 4.58 M/UL
RBC # FLD: 14.2 %
SODIUM SERPL-SCNC: 142 MMOL/L
SOURCE AMPLIFICATION: NORMAL
T PALLIDUM AB SER QL IA: NEGATIVE
TRIGL SERPL-MCNC: 40 MG/DL
TSH SERPL-ACNC: 0.71 UIU/ML
WBC # FLD AUTO: 8.1 K/UL

## 2021-08-23 ENCOUNTER — OUTPATIENT (OUTPATIENT)
Dept: OUTPATIENT SERVICES | Facility: HOSPITAL | Age: 21
LOS: 1 days | Discharge: HOME | End: 2021-08-23

## 2021-08-23 DIAGNOSIS — D69.3 IMMUNE THROMBOCYTOPENIC PURPURA: ICD-10-CM

## 2021-08-25 ENCOUNTER — RESULT REVIEW (OUTPATIENT)
Age: 21
End: 2021-08-25

## 2021-08-25 ENCOUNTER — APPOINTMENT (OUTPATIENT)
Dept: HEMATOLOGY ONCOLOGY | Facility: CLINIC | Age: 21
End: 2021-08-25
Payer: COMMERCIAL

## 2021-08-25 VITALS
WEIGHT: 244.71 LBS | HEART RATE: 86 BPM | OXYGEN SATURATION: 98 % | TEMPERATURE: 98 F | RESPIRATION RATE: 16 BRPM | SYSTOLIC BLOOD PRESSURE: 120 MMHG | DIASTOLIC BLOOD PRESSURE: 70 MMHG

## 2021-08-25 LAB
BASOPHILS # BLD AUTO: 0.03 K/UL — SIGNIFICANT CHANGE UP (ref 0–0.2)
BASOPHILS NFR BLD AUTO: 0.4 % — SIGNIFICANT CHANGE UP (ref 0–2)
EOSINOPHIL # BLD AUTO: 0.17 K/UL — SIGNIFICANT CHANGE UP (ref 0–0.5)
EOSINOPHIL NFR BLD AUTO: 2.5 % — SIGNIFICANT CHANGE UP (ref 0–6)
HCT VFR BLD CALC: 38.3 % — SIGNIFICANT CHANGE UP (ref 34.5–45)
HGB BLD-MCNC: 12.7 G/DL — SIGNIFICANT CHANGE UP (ref 11.5–15.5)
IMM GRANULOCYTES NFR BLD AUTO: 0.3 % — SIGNIFICANT CHANGE UP (ref 0–1.5)
LYMPHOCYTES # BLD AUTO: 2.61 K/UL — SIGNIFICANT CHANGE UP (ref 1–3.3)
LYMPHOCYTES # BLD AUTO: 39 % — SIGNIFICANT CHANGE UP (ref 13–44)
MCHC RBC-ENTMCNC: 28 PG — SIGNIFICANT CHANGE UP (ref 27–34)
MCHC RBC-ENTMCNC: 33.2 G/DL — SIGNIFICANT CHANGE UP (ref 32–36)
MCV RBC AUTO: 84.5 FL — SIGNIFICANT CHANGE UP (ref 80–100)
MONOCYTES # BLD AUTO: 0.61 K/UL — SIGNIFICANT CHANGE UP (ref 0–0.9)
MONOCYTES NFR BLD AUTO: 9.1 % — SIGNIFICANT CHANGE UP (ref 2–14)
NEUTROPHILS # BLD AUTO: 3.26 K/UL — SIGNIFICANT CHANGE UP (ref 1.8–7.4)
NEUTROPHILS NFR BLD AUTO: 48.7 % — SIGNIFICANT CHANGE UP (ref 43–77)
NRBC # BLD: 0 /100 WBCS — SIGNIFICANT CHANGE UP (ref 0–0)
PLATELET # BLD AUTO: 210 K/UL — SIGNIFICANT CHANGE UP (ref 150–400)
RBC # BLD: 4.53 M/UL — SIGNIFICANT CHANGE UP (ref 3.8–5.2)
RBC # FLD: 13.9 % — SIGNIFICANT CHANGE UP (ref 10.3–14.5)
WBC # BLD: 6.7 K/UL — SIGNIFICANT CHANGE UP (ref 3.8–10.5)
WBC # FLD AUTO: 6.7 K/UL — SIGNIFICANT CHANGE UP (ref 3.8–10.5)

## 2021-08-25 PROCEDURE — 99214 OFFICE O/P EST MOD 30 MIN: CPT

## 2021-08-28 NOTE — ASSESSMENT
[FreeTextEntry1] : This is a 21 year old female with ITP. Initially diagnosed at [facility] in the end of April- no records. She was treated with cefdinir/zithromax for a RLL pneumonia as well as IVIG/plt transfusion. \par No steroids given per patient. \par Hospitalized at [facility] ( - ) for second episode of relapsed ITP on Prednisone 2.5mg daily.\par \par She has intermittent relapses off the steroids each time it was weaned below the 2.5mg daily dose.  This has occurred twice already.  Though platelets in the  200-280's range for the past 5 months  since April.  She currently has a platelet count of 210 while on only 5mg a day.  Rituxan treatment has been discussed for refractory ITP.  Given the stability of the platelets for 5 months on just 5mg, would attempt one more prednisone taper, but this time at very small increments.   to 3mg X 5 days, 2mg for 5 days, then 1mg daily for the following week, recheck platelet counts in 2 weeks to taper to off.  Suspect sheh as been on prednisone for long enough for her native adrenal gland production has been delayed.  We can always attempt the Rituxan therapy farther down the line if this is still ineffective.

## 2021-08-28 NOTE — HISTORY OF PRESENT ILLNESS
[de-identified] : This is a 20 year old female with a history of eczema/asthma who is here for an evaluation of thrombocytopenia. She was admitted to [facility] for 3 days at the end of April for ITP. She noticed petechiae on her chest wall, leg, mouth bleeding, longer menstrual cycle (9 days instead of 4- not more heavy). She was found to have low plt, unknown level. She was given a platelet transfusion followed by IVIG. She was seen by hematology, unknown who. She was not started on any steroids. She was discharged, called her PCP, Dr. Pollack the following day for recurrent oral bleeding. She was seen at [facility], plt at that time was 181,000 on 4/27. She was seen by Dr. Pollack yesterday, plt were found to be 32, repeated later 26. During the hospitalization, she was told she had a RLL pneumonia and was treated with cefdinir and azithromycin. \par \par  [de-identified] : Patient on 10mg tablet 1/2 tablet daily since June.  Platelets have been normal in the 200's range.  Historically in the past each time patient weans down from prednisone below the 2.5mg galen, she develops a sudden and rapid decrease in platelets.  Rituxan for refractory ITP has been discussed in the past.  \par \par Patient received the covid 19 vaccine in June. Platelets did not fall precipitously after that.\par When patient experiences ITP has marks on the legs and a breakout of eczema on the arms.

## 2021-09-14 ENCOUNTER — NON-APPOINTMENT (OUTPATIENT)
Age: 21
End: 2021-09-14

## 2021-09-15 ENCOUNTER — NON-APPOINTMENT (OUTPATIENT)
Age: 21
End: 2021-09-15

## 2021-09-15 ENCOUNTER — RX RENEWAL (OUTPATIENT)
Age: 21
End: 2021-09-15

## 2021-09-15 ENCOUNTER — APPOINTMENT (OUTPATIENT)
Dept: OTOLARYNGOLOGY | Facility: CLINIC | Age: 21
End: 2021-09-15
Payer: COMMERCIAL

## 2021-09-15 VITALS — BODY MASS INDEX: 44.9 KG/M2 | HEIGHT: 62 IN | WEIGHT: 244 LBS | TEMPERATURE: 98 F

## 2021-09-15 DIAGNOSIS — L29.9 PRURITUS, UNSPECIFIED: ICD-10-CM

## 2021-09-15 DIAGNOSIS — H61.23 IMPACTED CERUMEN, BILATERAL: ICD-10-CM

## 2021-09-15 PROCEDURE — 99204 OFFICE O/P NEW MOD 45 MIN: CPT | Mod: 25

## 2021-09-15 PROCEDURE — 69210 REMOVE IMPACTED EAR WAX UNI: CPT

## 2021-09-15 RX ORDER — MOMETASONE FUROATE 1 MG/ML
0.1 SOLUTION TOPICAL
Qty: 1 | Refills: 3 | Status: ACTIVE | COMMUNITY
Start: 2021-09-15 | End: 1900-01-01

## 2021-09-15 NOTE — PROCEDURE
[FreeTextEntry3] : Procedure - Cerumen Removal. \par After informed verbal consent is obtained, cerumen is removed from the b/l ear canal with a suction.  Normal appearing canal following removal.\par

## 2021-09-15 NOTE — HISTORY OF PRESENT ILLNESS
[de-identified] : 22 y/o F with Hx of ITP, notes one month of ear itching.  No pain, d/c, vertigo, tinnitus or change in hearing.

## 2021-09-15 NOTE — END OF VISIT
[FreeTextEntry3] : I personally saw and examined MINDY OSBORNE in detail.  I spoke to BLAYNE Cardoso regarding the assessment and plan of care. I performed the procedures and relevant physical exam.  I have reviewed the above assessment and plan of care and I agree.  I have made changes to the body of the note wherever necessary and appropriate.

## 2021-09-15 NOTE — PHYSICAL EXAM
[Midline] : trachea located in midline position [Normal] : no rashes [de-identified] : b/l cerumen, erythematous scaly EAC AU

## 2021-09-15 NOTE — ASSESSMENT
[FreeTextEntry1] : 20 y/o F with b/l ear itching and cerumen:\par - Cerumen removed in office today\par - Mometasone gtts for itching and eczematous changes to canal \par - Feels hearing is good and declined audiogram\par - F/U 6 months

## 2021-09-15 NOTE — CONSULT LETTER
[Dear  ___] : Dear  [unfilled], [Consult Letter:] : I had the pleasure of evaluating your patient, [unfilled]. [Please see my note below.] : Please see my note below. [Consult Closing:] : Thank you very much for allowing me to participate in the care of this patient.  If you have any questions, please do not hesitate to contact me. [Sincerely,] : Sincerely, [FreeTextEntry3] : Larisa De Luna M.D.\par Attending Physician,  \par Department of Otolaryngology - Head and Neck Surgery\par [facility] hospital \par Office: [phone]\par Fax: [phone]\par

## 2021-10-06 PROBLEM — R77.9 ELEVATED BLOOD PROTEIN: Status: ACTIVE | Noted: 2020-05-04

## 2021-10-20 ENCOUNTER — LABORATORY RESULT (OUTPATIENT)
Age: 21
End: 2021-10-20

## 2021-10-20 ENCOUNTER — APPOINTMENT (OUTPATIENT)
Dept: DERMATOLOGY | Facility: CLINIC | Age: 21
End: 2021-10-20
Payer: COMMERCIAL

## 2021-10-20 VITALS — WEIGHT: 244 LBS | HEIGHT: 62 IN | BODY MASS INDEX: 44.9 KG/M2

## 2021-10-20 DIAGNOSIS — R23.3 SPONTANEOUS ECCHYMOSES: ICD-10-CM

## 2021-10-20 DIAGNOSIS — D48.5 NEOPLASM OF UNCERTAIN BEHAVIOR OF SKIN: ICD-10-CM

## 2021-10-20 PROCEDURE — 11102 TANGNTL BX SKIN SINGLE LES: CPT

## 2021-10-20 PROCEDURE — 99204 OFFICE O/P NEW MOD 45 MIN: CPT | Mod: 25

## 2021-11-03 ENCOUNTER — NON-APPOINTMENT (OUTPATIENT)
Age: 21
End: 2021-11-03

## 2021-11-08 ENCOUNTER — NON-APPOINTMENT (OUTPATIENT)
Age: 21
End: 2021-11-08

## 2021-11-11 ENCOUNTER — OUTPATIENT (OUTPATIENT)
Dept: OUTPATIENT SERVICES | Facility: HOSPITAL | Age: 21
LOS: 1 days | Discharge: HOME | End: 2021-11-11

## 2021-11-11 DIAGNOSIS — D69.3 IMMUNE THROMBOCYTOPENIC PURPURA: ICD-10-CM

## 2021-11-15 ENCOUNTER — RESULT REVIEW (OUTPATIENT)
Age: 21
End: 2021-11-15

## 2021-11-15 ENCOUNTER — APPOINTMENT (OUTPATIENT)
Dept: HEMATOLOGY ONCOLOGY | Facility: CLINIC | Age: 21
End: 2021-11-15

## 2021-11-15 LAB
BASOPHILS # BLD AUTO: 0.04 K/UL — SIGNIFICANT CHANGE UP (ref 0–0.2)
BASOPHILS NFR BLD AUTO: 0.5 % — SIGNIFICANT CHANGE UP (ref 0–2)
EOSINOPHIL # BLD AUTO: 0.6 K/UL — HIGH (ref 0–0.5)
EOSINOPHIL NFR BLD AUTO: 7.1 % — HIGH (ref 0–6)
HCT VFR BLD CALC: 37.4 % — SIGNIFICANT CHANGE UP (ref 34.5–45)
HGB BLD-MCNC: 12.3 G/DL — SIGNIFICANT CHANGE UP (ref 11.5–15.5)
IMM GRANULOCYTES NFR BLD AUTO: 0.4 % — SIGNIFICANT CHANGE UP (ref 0–1.5)
LYMPHOCYTES # BLD AUTO: 3.15 K/UL — SIGNIFICANT CHANGE UP (ref 1–3.3)
LYMPHOCYTES # BLD AUTO: 37 % — SIGNIFICANT CHANGE UP (ref 13–44)
MCHC RBC-ENTMCNC: 27.6 PG — SIGNIFICANT CHANGE UP (ref 27–34)
MCHC RBC-ENTMCNC: 32.9 G/DL — SIGNIFICANT CHANGE UP (ref 32–36)
MCV RBC AUTO: 83.9 FL — SIGNIFICANT CHANGE UP (ref 80–100)
MONOCYTES # BLD AUTO: 0.65 K/UL — SIGNIFICANT CHANGE UP (ref 0–0.9)
MONOCYTES NFR BLD AUTO: 7.6 % — SIGNIFICANT CHANGE UP (ref 2–14)
NEUTROPHILS # BLD AUTO: 4.04 K/UL — SIGNIFICANT CHANGE UP (ref 1.8–7.4)
NEUTROPHILS NFR BLD AUTO: 47.4 % — SIGNIFICANT CHANGE UP (ref 43–77)
NRBC # BLD: 0 /100 WBCS — SIGNIFICANT CHANGE UP (ref 0–0)
PLATELET # BLD AUTO: 172 K/UL — SIGNIFICANT CHANGE UP (ref 150–400)
RBC # BLD: 4.46 M/UL — SIGNIFICANT CHANGE UP (ref 3.8–5.2)
RBC # FLD: 13.4 % — SIGNIFICANT CHANGE UP (ref 10.3–14.5)
WBC # BLD: 8.51 K/UL — SIGNIFICANT CHANGE UP (ref 3.8–10.5)
WBC # FLD AUTO: 8.51 K/UL — SIGNIFICANT CHANGE UP (ref 3.8–10.5)

## 2021-11-24 ENCOUNTER — APPOINTMENT (OUTPATIENT)
Dept: HEMATOLOGY ONCOLOGY | Facility: CLINIC | Age: 21
End: 2021-11-24

## 2021-12-08 ENCOUNTER — APPOINTMENT (OUTPATIENT)
Dept: DERMATOLOGY | Facility: CLINIC | Age: 21
End: 2021-12-08
Payer: COMMERCIAL

## 2021-12-08 PROCEDURE — 99214 OFFICE O/P EST MOD 30 MIN: CPT

## 2021-12-09 ENCOUNTER — RESULT REVIEW (OUTPATIENT)
Age: 21
End: 2021-12-09

## 2021-12-09 ENCOUNTER — APPOINTMENT (OUTPATIENT)
Dept: HEMATOLOGY ONCOLOGY | Facility: CLINIC | Age: 21
End: 2021-12-09
Payer: COMMERCIAL

## 2021-12-09 VITALS
WEIGHT: 242.51 LBS | SYSTOLIC BLOOD PRESSURE: 139 MMHG | DIASTOLIC BLOOD PRESSURE: 91 MMHG | TEMPERATURE: 98 F | RESPIRATION RATE: 16 BRPM | HEART RATE: 106 BPM | OXYGEN SATURATION: 98 %

## 2021-12-09 LAB
BASOPHILS # BLD AUTO: 0.04 K/UL — SIGNIFICANT CHANGE UP (ref 0–0.2)
BASOPHILS NFR BLD AUTO: 0.3 % — SIGNIFICANT CHANGE UP (ref 0–2)
EOSINOPHIL # BLD AUTO: 0.72 K/UL — HIGH (ref 0–0.5)
EOSINOPHIL NFR BLD AUTO: 6.3 % — HIGH (ref 0–6)
HCT VFR BLD CALC: 38 % — SIGNIFICANT CHANGE UP (ref 34.5–45)
HGB BLD-MCNC: 12.3 G/DL — SIGNIFICANT CHANGE UP (ref 11.5–15.5)
IMM GRANULOCYTES NFR BLD AUTO: 0.4 % — SIGNIFICANT CHANGE UP (ref 0–1.5)
LYMPHOCYTES # BLD AUTO: 2.79 K/UL — SIGNIFICANT CHANGE UP (ref 1–3.3)
LYMPHOCYTES # BLD AUTO: 24.3 % — SIGNIFICANT CHANGE UP (ref 13–44)
MCHC RBC-ENTMCNC: 27.3 PG — SIGNIFICANT CHANGE UP (ref 27–34)
MCHC RBC-ENTMCNC: 32.4 G/DL — SIGNIFICANT CHANGE UP (ref 32–36)
MCV RBC AUTO: 84.4 FL — SIGNIFICANT CHANGE UP (ref 80–100)
MONOCYTES # BLD AUTO: 0.81 K/UL — SIGNIFICANT CHANGE UP (ref 0–0.9)
MONOCYTES NFR BLD AUTO: 7.1 % — SIGNIFICANT CHANGE UP (ref 2–14)
NEUTROPHILS # BLD AUTO: 7.07 K/UL — SIGNIFICANT CHANGE UP (ref 1.8–7.4)
NEUTROPHILS NFR BLD AUTO: 61.6 % — SIGNIFICANT CHANGE UP (ref 43–77)
NRBC # BLD: 0 /100 WBCS — SIGNIFICANT CHANGE UP (ref 0–0)
PLATELET # BLD AUTO: 186 K/UL — SIGNIFICANT CHANGE UP (ref 150–400)
RBC # BLD: 4.5 M/UL — SIGNIFICANT CHANGE UP (ref 3.8–5.2)
RBC # FLD: 13.6 % — SIGNIFICANT CHANGE UP (ref 10.3–14.5)
WBC # BLD: 11.48 K/UL — HIGH (ref 3.8–10.5)
WBC # FLD AUTO: 11.48 K/UL — HIGH (ref 3.8–10.5)

## 2021-12-09 PROCEDURE — 99214 OFFICE O/P EST MOD 30 MIN: CPT

## 2021-12-09 RX ORDER — DUPILUMAB 300 MG/2ML
300 INJECTION, SOLUTION SUBCUTANEOUS
Qty: 1 | Refills: 1 | Status: ACTIVE | COMMUNITY
Start: 2021-11-04 | End: 1900-01-01

## 2021-12-11 NOTE — HISTORY OF PRESENT ILLNESS
[de-identified] : Patient tapered off prednisone, platelets 186 today and stable. Noticed eczema after the cessation. Going to a dermatologist.  \par Patient with hx of atopic eczema. Going to start Dupixent.  \par

## 2021-12-11 NOTE — ASSESSMENT
[FreeTextEntry1] : This is a 21 year old female with ITP. Initially diagnosed at [facility] in the end of April- no records. She was treated with cefdinir/zithromax for a RLL pneumonia as well as IVIG/plt transfusion. \par No steroids given per patient. \par Hospitalized at [facility] (4/18 - 4/20) for second episode of relapsed ITP on Prednisone 2.5mg daily.\par \par She has intermittent relapses off the steroids each time it was weaned below the 2.5mg daily dose.  From April into September platelets were in the 200's range and very stable. We began an extremly slow taper for prednisone at 5mg decreasing 1mg at a time.  Platelets 186 off of prednisone for the past few weeks.  Remains  stable.\par Will follow up in 3 months.  \par Discussed signs of low platelets recurring including petechia, epistaxis, gingival bleeding and exaggerated bleeding and bruising.  If this should occur can come back for a more expedient re-evaluation.

## 2021-12-13 ENCOUNTER — NON-APPOINTMENT (OUTPATIENT)
Age: 21
End: 2021-12-13

## 2021-12-14 ENCOUNTER — NON-APPOINTMENT (OUTPATIENT)
Age: 21
End: 2021-12-14

## 2021-12-18 ENCOUNTER — APPOINTMENT (OUTPATIENT)
Dept: DERMATOLOGY | Facility: CLINIC | Age: 21
End: 2021-12-18
Payer: COMMERCIAL

## 2021-12-18 PROCEDURE — 99214 OFFICE O/P EST MOD 30 MIN: CPT | Mod: 25

## 2021-12-18 PROCEDURE — 96372 THER/PROPH/DIAG INJ SC/IM: CPT

## 2021-12-18 NOTE — ASSESSMENT
[FreeTextEntry1] : 1) AD:\par -We discussed Dupixent side effects including injection site reaction, enthesitis,conjunctivitis.\par -I prepped two areas on her abdomen with 70% IPEtoh.\par -Instructed her to place injection on abdomen and wait until 2nd click to lift up.\par -She injected herself. We waited 15 min and she has no reaction.\par -Sent refills for 300mg qevery 2 weeks.

## 2021-12-18 NOTE — PHYSICAL EXAM
[Alert] : alert [Well Nourished] : well nourished [Oriented x 3] : ~L oriented x 3 [FreeTextEntry3] : ill defined eczemaotus plaques on the abdomen

## 2021-12-18 NOTE — HISTORY OF PRESENT ILLNESS
[FreeTextEntry1] : AD [de-identified] : She is here with her mother to start Dupixent. She brought her medication with her.

## 2021-12-30 ENCOUNTER — NON-APPOINTMENT (OUTPATIENT)
Age: 21
End: 2021-12-30

## 2022-02-10 ENCOUNTER — APPOINTMENT (OUTPATIENT)
Dept: DERMATOLOGY | Facility: CLINIC | Age: 22
End: 2022-02-10
Payer: COMMERCIAL

## 2022-02-10 VITALS — BODY MASS INDEX: 42.33 KG/M2 | WEIGHT: 230 LBS | HEIGHT: 62 IN

## 2022-02-10 PROCEDURE — 99214 OFFICE O/P EST MOD 30 MIN: CPT

## 2022-02-10 NOTE — HISTORY OF PRESENT ILLNESS
[FreeTextEntry1] : eczema [de-identified] : 22 year old female here for eczema. doing well on dupixent. missed dose bc of insurance lapse.

## 2022-03-18 ENCOUNTER — APPOINTMENT (OUTPATIENT)
Dept: DERMATOLOGY | Facility: CLINIC | Age: 22
End: 2022-03-18
Payer: COMMERCIAL

## 2022-03-18 DIAGNOSIS — L20.9 ATOPIC DERMATITIS, UNSPECIFIED: ICD-10-CM

## 2022-03-18 PROCEDURE — 99214 OFFICE O/P EST MOD 30 MIN: CPT

## 2022-03-23 RX ORDER — TRIAMCINOLONE ACETONIDE 1 MG/G
0.1 OINTMENT TOPICAL
Qty: 1 | Refills: 0 | Status: ACTIVE | COMMUNITY
Start: 2021-11-04

## 2022-06-15 ENCOUNTER — APPOINTMENT (OUTPATIENT)
Dept: DERMATOLOGY | Facility: CLINIC | Age: 22
End: 2022-06-15
Payer: COMMERCIAL

## 2022-06-15 PROCEDURE — 99214 OFFICE O/P EST MOD 30 MIN: CPT

## 2022-06-16 ENCOUNTER — OUTPATIENT (OUTPATIENT)
Dept: OUTPATIENT SERVICES | Facility: HOSPITAL | Age: 22
LOS: 1 days | Discharge: HOME | End: 2022-06-16

## 2022-06-16 DIAGNOSIS — D69.3 IMMUNE THROMBOCYTOPENIC PURPURA: ICD-10-CM

## 2022-06-20 ENCOUNTER — OUTPATIENT (OUTPATIENT)
Dept: OUTPATIENT SERVICES | Facility: HOSPITAL | Age: 22
LOS: 1 days | Discharge: HOME | End: 2022-06-20

## 2022-06-20 DIAGNOSIS — D69.3 IMMUNE THROMBOCYTOPENIC PURPURA: ICD-10-CM

## 2022-07-18 ENCOUNTER — APPOINTMENT (OUTPATIENT)
Dept: INTERNAL MEDICINE | Facility: CLINIC | Age: 22
End: 2022-07-18

## 2022-07-27 ENCOUNTER — APPOINTMENT (OUTPATIENT)
Dept: HEMATOLOGY ONCOLOGY | Facility: CLINIC | Age: 22
End: 2022-07-27

## 2022-07-29 ENCOUNTER — APPOINTMENT (OUTPATIENT)
Dept: INTERNAL MEDICINE | Facility: CLINIC | Age: 22
End: 2022-07-29

## 2022-08-11 ENCOUNTER — RESULT REVIEW (OUTPATIENT)
Age: 22
End: 2022-08-11

## 2022-08-11 ENCOUNTER — APPOINTMENT (OUTPATIENT)
Dept: HEMATOLOGY ONCOLOGY | Facility: CLINIC | Age: 22
End: 2022-08-11

## 2022-08-11 VITALS
WEIGHT: 236.31 LBS | RESPIRATION RATE: 16 BRPM | HEART RATE: 69 BPM | TEMPERATURE: 97.3 F | DIASTOLIC BLOOD PRESSURE: 73 MMHG | SYSTOLIC BLOOD PRESSURE: 105 MMHG | BODY MASS INDEX: 43.49 KG/M2 | HEIGHT: 62 IN | OXYGEN SATURATION: 100 %

## 2022-08-11 LAB
ACANTHOCYTES BLD QL SMEAR: SLIGHT — SIGNIFICANT CHANGE UP
BASOPHILS # BLD AUTO: 0 K/UL — SIGNIFICANT CHANGE UP (ref 0–0.2)
BASOPHILS NFR BLD AUTO: 0 % — SIGNIFICANT CHANGE UP (ref 0–2)
EOSINOPHIL # BLD AUTO: 0.3 K/UL — SIGNIFICANT CHANGE UP (ref 0–0.5)
EOSINOPHIL NFR BLD AUTO: 5 % — SIGNIFICANT CHANGE UP (ref 0–6)
HCT VFR BLD CALC: 35.4 % — SIGNIFICANT CHANGE UP (ref 34.5–45)
HGB BLD-MCNC: 11.9 G/DL — SIGNIFICANT CHANGE UP (ref 11.5–15.5)
LYMPHOCYTES # BLD AUTO: 3.32 K/UL — HIGH (ref 1–3.3)
LYMPHOCYTES # BLD AUTO: 55 % — HIGH (ref 13–44)
MCHC RBC-ENTMCNC: 27.4 PG — SIGNIFICANT CHANGE UP (ref 27–34)
MCHC RBC-ENTMCNC: 33.6 G/DL — SIGNIFICANT CHANGE UP (ref 32–36)
MCV RBC AUTO: 81.6 FL — SIGNIFICANT CHANGE UP (ref 80–100)
MONOCYTES # BLD AUTO: 0.36 K/UL — SIGNIFICANT CHANGE UP (ref 0–0.9)
MONOCYTES NFR BLD AUTO: 6 % — SIGNIFICANT CHANGE UP (ref 2–14)
NEUTROPHILS # BLD AUTO: 1.81 K/UL — SIGNIFICANT CHANGE UP (ref 1.8–7.4)
NEUTROPHILS NFR BLD AUTO: 30 % — LOW (ref 43–77)
NRBC # BLD: 0 /100 — SIGNIFICANT CHANGE UP (ref 0–0)
NRBC # BLD: SIGNIFICANT CHANGE UP /100 WBCS (ref 0–0)
PLAT MORPH BLD: NORMAL — SIGNIFICANT CHANGE UP
PLATELET # BLD AUTO: 225 K/UL — SIGNIFICANT CHANGE UP (ref 150–400)
POIKILOCYTOSIS BLD QL AUTO: SLIGHT — SIGNIFICANT CHANGE UP
RBC # BLD: 4.34 M/UL — SIGNIFICANT CHANGE UP (ref 3.8–5.2)
RBC # FLD: 13.2 % — SIGNIFICANT CHANGE UP (ref 10.3–14.5)
RBC BLD AUTO: ABNORMAL
VARIANT LYMPHS # BLD: 4 % — SIGNIFICANT CHANGE UP (ref 0–6)
WBC # BLD: 6.03 K/UL — SIGNIFICANT CHANGE UP (ref 3.8–10.5)
WBC # FLD AUTO: 6.03 K/UL — SIGNIFICANT CHANGE UP (ref 3.8–10.5)

## 2022-08-11 PROCEDURE — 99214 OFFICE O/P EST MOD 30 MIN: CPT

## 2022-08-11 RX ORDER — PREDNISONE 1 MG/1
1 TABLET ORAL
Qty: 40 | Refills: 0 | Status: DISCONTINUED | COMMUNITY
Start: 2021-08-25 | End: 2022-08-11

## 2022-08-11 RX ORDER — PREDNISONE 10 MG/1
10 TABLET ORAL DAILY
Qty: 60 | Refills: 0 | Status: DISCONTINUED | COMMUNITY
Start: 2021-06-24 | End: 2022-08-11

## 2022-08-11 RX ORDER — PREDNISONE 1 MG/1
1 TABLET ORAL
Qty: 30 | Refills: 0 | Status: DISCONTINUED | COMMUNITY
Start: 2021-09-16 | End: 2022-08-11

## 2022-08-11 NOTE — HISTORY OF PRESENT ILLNESS
[de-identified] : This is a 22 year old female with a history of eczema/asthma who is here for an evaluation of thrombocytopenia. She was admitted to [facility] for 3 days at the end of April for ITP. She noticed petechiae on her chest wall, leg, mouth bleeding, longer menstrual cycle (9 days instead of 4- not more heavy). She was found to have low plt, unknown level. She was given a platelet transfusion followed by IVIG. She was seen by hematology, unknown who. She was not started on any steroids. She was discharged, called her PCP, Dr. Pollack the following day for recurrent oral bleeding. She was seen at [facility], plt at that time was 181,000 on 4/27. She was seen by Dr. Pollack yesterday, plt were found to be 32, repeated later 26. During the hospitalization, she was told she had a RLL pneumonia and was treated with cefdinir and azithromycin. \par \par  [de-identified] : Patient had covid twice in the past year. In January, and July. Fist pronounced by severe asthma exacerbation, 2nd time patient had lost taste but it came back.  \par Patient has had some harsh pains in the let quadrant form time to time.  \par \par Has been off of steroids since last September.  Was tapered to off in very small increments. 5-3-2-1mg.  Platelets have remained stable in the 180-200 range over the following year.  .

## 2022-08-11 NOTE — ASSESSMENT
[FreeTextEntry1] : This is a 22 year old female with ITP. Initially diagnosed at [facility] in the end of April- no records. She was treated with cefdinir/zithromax for a RLL pneumonia as well as IVIG/plt transfusion. \par No steroids given per patient. \par \par She has intermittent relapses off the steroids each time it was weaned below the 2.5mg daily dose.  However we were able to wean her off in September 2021 by going in very small increments.  SInce then platelets have been very stable  From April into September platelets were in the 200's range and very stable. We began an extremely slow taper for prednisone at 5mg decreasing 1mg at a time.  Platelets 186 off of prednisone for the past few weeks.  Remains  stable, 225 today 8/11/22.  Platets very stable for past year. Can decrease follow up to Q 6 months.

## 2022-08-12 LAB
RETICS #: 57.5 K/UL — SIGNIFICANT CHANGE UP (ref 25–125)
RETICS/RBC NFR: 1.3 % — SIGNIFICANT CHANGE UP (ref 0.5–2.5)

## 2022-08-19 LAB
ALBUMIN SERPL ELPH-MCNC: 4.2 G/DL
ALP BLD-CCNC: 79 U/L
ALT SERPL-CCNC: 14 U/L
ANION GAP SERPL CALC-SCNC: 12 MMOL/L
AST SERPL-CCNC: 17 U/L
BILIRUB SERPL-MCNC: 0.2 MG/DL
BUN SERPL-MCNC: 12 MG/DL
CALCIUM SERPL-MCNC: 9.5 MG/DL
CHLORIDE SERPL-SCNC: 108 MMOL/L
CO2 SERPL-SCNC: 23 MMOL/L
CREAT SERPL-MCNC: 0.58 MG/DL
EGFR: 131 ML/MIN/1.73M2
FERRITIN SERPL-MCNC: 66 NG/ML
FOLATE SERPL-MCNC: 10.5 NG/ML
GLUCOSE SERPL-MCNC: 97 MG/DL
INR PPP: 1.01 RATIO
IRON SATN MFR SERPL: 13 %
IRON SERPL-MCNC: 37 UG/DL
POTASSIUM SERPL-SCNC: 3.9 MMOL/L
PROT SERPL-MCNC: 7.3 G/DL
PT BLD: 11.7 SEC
SODIUM SERPL-SCNC: 142 MMOL/L
TIBC SERPL-MCNC: 294 UG/DL
UIBC SERPL-MCNC: 257 UG/DL
VIT B12 SERPL-MCNC: 329 PG/ML

## 2022-10-04 ENCOUNTER — APPOINTMENT (OUTPATIENT)
Dept: INTERNAL MEDICINE | Facility: CLINIC | Age: 22
End: 2022-10-04

## 2022-10-04 VITALS
HEIGHT: 62 IN | DIASTOLIC BLOOD PRESSURE: 82 MMHG | BODY MASS INDEX: 42.33 KG/M2 | SYSTOLIC BLOOD PRESSURE: 128 MMHG | OXYGEN SATURATION: 98 % | TEMPERATURE: 98.4 F | HEART RATE: 81 BPM | WEIGHT: 230 LBS

## 2022-10-04 DIAGNOSIS — Z11.3 ENCOUNTER FOR SCREENING FOR INFECTIONS WITH A PREDOMINANTLY SEXUAL MODE OF TRANSMISSION: ICD-10-CM

## 2022-10-04 DIAGNOSIS — Z12.4 ENCOUNTER FOR SCREENING FOR MALIGNANT NEOPLASM OF CERVIX: ICD-10-CM

## 2022-10-04 DIAGNOSIS — Z23 ENCOUNTER FOR IMMUNIZATION: ICD-10-CM

## 2022-10-04 PROCEDURE — G0444 DEPRESSION SCREEN ANNUAL: CPT | Mod: 59

## 2022-10-04 PROCEDURE — 36415 COLL VENOUS BLD VENIPUNCTURE: CPT

## 2022-10-04 PROCEDURE — 99395 PREV VISIT EST AGE 18-39: CPT | Mod: 25

## 2022-10-04 PROCEDURE — G0008: CPT

## 2022-10-04 PROCEDURE — 90686 IIV4 VACC NO PRSV 0.5 ML IM: CPT

## 2022-10-04 RX ORDER — PREDNISONE 20 MG/1
20 TABLET ORAL
Qty: 120 | Refills: 0 | Status: DISCONTINUED | COMMUNITY
Start: 2020-10-16 | End: 2022-10-04

## 2022-10-05 LAB
25(OH)D3 SERPL-MCNC: 22.2 NG/ML
ALBUMIN SERPL ELPH-MCNC: 4.5 G/DL
ALP BLD-CCNC: 89 U/L
ALT SERPL-CCNC: 18 U/L
ANION GAP SERPL CALC-SCNC: 12 MMOL/L
AST SERPL-CCNC: 16 U/L
BASOPHILS # BLD AUTO: 0.05 K/UL
BASOPHILS NFR BLD AUTO: 0.8 %
BILIRUB SERPL-MCNC: 0.3 MG/DL
BUN SERPL-MCNC: 16 MG/DL
C TRACH RRNA SPEC QL NAA+PROBE: NOT DETECTED
CALCIUM SERPL-MCNC: 9.8 MG/DL
CHLORIDE SERPL-SCNC: 101 MMOL/L
CHOLEST SERPL-MCNC: 217 MG/DL
CO2 SERPL-SCNC: 23 MMOL/L
CREAT SERPL-MCNC: 0.55 MG/DL
EGFR: 133 ML/MIN/1.73M2
EOSINOPHIL # BLD AUTO: 0.38 K/UL
EOSINOPHIL NFR BLD AUTO: 5.9 %
ESTIMATED AVERAGE GLUCOSE: 114 MG/DL
GLUCOSE SERPL-MCNC: 84 MG/DL
HAV IGM SER QL: NONREACTIVE
HBA1C MFR BLD HPLC: 5.6 %
HBV CORE IGM SER QL: NONREACTIVE
HBV SURFACE AG SER QL: NONREACTIVE
HCT VFR BLD CALC: 37.3 %
HCV AB SER QL: NONREACTIVE
HCV S/CO RATIO: 0.17 S/CO
HDLC SERPL-MCNC: 94 MG/DL
HGB BLD-MCNC: 12.1 G/DL
HIV1+2 AB SPEC QL IA.RAPID: NONREACTIVE
IMM GRANULOCYTES NFR BLD AUTO: 0.3 %
LDLC SERPL CALC-MCNC: 114 MG/DL
LYMPHOCYTES # BLD AUTO: 2.79 K/UL
LYMPHOCYTES NFR BLD AUTO: 43.6 %
MAN DIFF?: NORMAL
MCHC RBC-ENTMCNC: 26.4 PG
MCHC RBC-ENTMCNC: 32.4 GM/DL
MCV RBC AUTO: 81.4 FL
MONOCYTES # BLD AUTO: 0.45 K/UL
MONOCYTES NFR BLD AUTO: 7 %
N GONORRHOEA RRNA SPEC QL NAA+PROBE: NOT DETECTED
NEUTROPHILS # BLD AUTO: 2.71 K/UL
NEUTROPHILS NFR BLD AUTO: 42.4 %
NONHDLC SERPL-MCNC: 123 MG/DL
PLATELET # BLD AUTO: 125 K/UL
POTASSIUM SERPL-SCNC: 3.9 MMOL/L
PROT SERPL-MCNC: 7.7 G/DL
RBC # BLD: 4.58 M/UL
RBC # FLD: 13.3 %
SODIUM SERPL-SCNC: 137 MMOL/L
SOURCE AMPLIFICATION: NORMAL
T PALLIDUM AB SER QL IA: NEGATIVE
TRIGL SERPL-MCNC: 46 MG/DL
TSH SERPL-ACNC: 0.58 UIU/ML
WBC # FLD AUTO: 6.4 K/UL

## 2022-10-06 ENCOUNTER — NON-APPOINTMENT (OUTPATIENT)
Age: 22
End: 2022-10-06

## 2022-10-12 ENCOUNTER — APPOINTMENT (OUTPATIENT)
Dept: DERMATOLOGY | Facility: CLINIC | Age: 22
End: 2022-10-12

## 2022-11-02 ENCOUNTER — APPOINTMENT (OUTPATIENT)
Dept: OBGYN | Facility: CLINIC | Age: 22
End: 2022-11-02

## 2022-11-02 ENCOUNTER — RESULT REVIEW (OUTPATIENT)
Age: 22
End: 2022-11-02

## 2022-11-02 PROCEDURE — 99385 PREV VISIT NEW AGE 18-39: CPT | Mod: 25

## 2022-11-02 PROCEDURE — 81002 URINALYSIS NONAUTO W/O SCOPE: CPT

## 2022-11-15 ENCOUNTER — RX RENEWAL (OUTPATIENT)
Age: 22
End: 2022-11-15

## 2022-11-18 ENCOUNTER — NON-APPOINTMENT (OUTPATIENT)
Age: 22
End: 2022-11-18

## 2022-11-18 ENCOUNTER — APPOINTMENT (OUTPATIENT)
Dept: HEMATOLOGY ONCOLOGY | Facility: CLINIC | Age: 22
End: 2022-11-18
Payer: MEDICARE

## 2022-11-18 ENCOUNTER — OUTPATIENT (OUTPATIENT)
Dept: OUTPATIENT SERVICES | Facility: HOSPITAL | Age: 22
LOS: 1 days | Discharge: HOME | End: 2022-11-18

## 2022-11-18 DIAGNOSIS — D69.3 IMMUNE THROMBOCYTOPENIC PURPURA: ICD-10-CM

## 2022-11-18 PROCEDURE — ZZZZZ: CPT

## 2022-11-18 PROCEDURE — 99442: CPT

## 2022-11-25 ENCOUNTER — RESULT REVIEW (OUTPATIENT)
Age: 22
End: 2022-11-25

## 2022-11-25 ENCOUNTER — APPOINTMENT (OUTPATIENT)
Dept: HEMATOLOGY ONCOLOGY | Facility: CLINIC | Age: 22
End: 2022-11-25

## 2022-11-25 VITALS
RESPIRATION RATE: 16 BRPM | OXYGEN SATURATION: 98 % | HEART RATE: 82 BPM | WEIGHT: 245.81 LBS | SYSTOLIC BLOOD PRESSURE: 150 MMHG | HEIGHT: 62.01 IN | BODY MASS INDEX: 44.66 KG/M2 | DIASTOLIC BLOOD PRESSURE: 89 MMHG | TEMPERATURE: 97.3 F

## 2022-11-25 LAB
BASOPHILS # BLD AUTO: 0.14 K/UL — SIGNIFICANT CHANGE UP (ref 0–0.2)
BASOPHILS NFR BLD AUTO: 1 % — SIGNIFICANT CHANGE UP (ref 0–2)
EOSINOPHIL # BLD AUTO: 0.14 K/UL — SIGNIFICANT CHANGE UP (ref 0–0.5)
EOSINOPHIL NFR BLD AUTO: 1 % — SIGNIFICANT CHANGE UP (ref 0–6)
HCT VFR BLD CALC: 36.6 % — SIGNIFICANT CHANGE UP (ref 34.5–45)
HGB BLD-MCNC: 11.9 G/DL — SIGNIFICANT CHANGE UP (ref 11.5–15.5)
LYMPHOCYTES # BLD AUTO: 59 % — HIGH (ref 13–44)
LYMPHOCYTES # BLD AUTO: 8.47 K/UL — HIGH (ref 1–3.3)
MCHC RBC-ENTMCNC: 26.6 PG — LOW (ref 27–34)
MCHC RBC-ENTMCNC: 32.5 G/DL — SIGNIFICANT CHANGE UP (ref 32–36)
MCV RBC AUTO: 81.7 FL — SIGNIFICANT CHANGE UP (ref 80–100)
MONOCYTES # BLD AUTO: 0.57 K/UL — SIGNIFICANT CHANGE UP (ref 0–0.9)
MONOCYTES NFR BLD AUTO: 4 % — SIGNIFICANT CHANGE UP (ref 2–14)
NEUTROPHILS # BLD AUTO: 5.03 K/UL — SIGNIFICANT CHANGE UP (ref 1.8–7.4)
NEUTROPHILS NFR BLD AUTO: 35 % — LOW (ref 43–77)
NRBC # BLD: 0 /100 — SIGNIFICANT CHANGE UP (ref 0–0)
NRBC # BLD: SIGNIFICANT CHANGE UP /100 WBCS (ref 0–0)
PLAT MORPH BLD: NORMAL — SIGNIFICANT CHANGE UP
PLATELET # BLD AUTO: 349 K/UL — SIGNIFICANT CHANGE UP (ref 150–400)
RBC # BLD: 4.48 M/UL — SIGNIFICANT CHANGE UP (ref 3.8–5.2)
RBC # FLD: 13.7 % — SIGNIFICANT CHANGE UP (ref 10.3–14.5)
RBC BLD AUTO: SIGNIFICANT CHANGE UP
WBC # BLD: 14.36 K/UL — HIGH (ref 3.8–10.5)
WBC # FLD AUTO: 14.36 K/UL — HIGH (ref 3.8–10.5)

## 2022-11-25 PROCEDURE — 99214 OFFICE O/P EST MOD 30 MIN: CPT

## 2022-11-25 NOTE — ASSESSMENT
[FreeTextEntry1] : This is a 22 year old female with ITP. Initially diagnosed at [facility] in the end of April- no records. \par She proceeded to have a tumultuous course often relapsing at prednisone doses less than 20 mg.  \par We were finally able to wean her off the prednisone this year, plates normal since April 2021.  \par \par Patient without any thrombocytopenia since April 2021. 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

## 2022-11-25 NOTE — HISTORY OF PRESENT ILLNESS
[de-identified] : This is a 22 year old female with a history of eczema/asthma who is here for an evaluation of thrombocytopenia. She was admitted to [facility] for 3 days at the end of April for ITP. She noticed petechiae on her chest wall, leg, mouth bleeding, longer menstrual cycle (9 days instead of 4- not more heavy). She was found to have low plt, unknown level. She was given a platelet transfusion followed by IVIG. She was seen by hematology, unknown who. She was not started on any steroids. She was discharged, called her PCP, Dr. Pollack the following day for recurrent oral bleeding. She was seen at [facility], plt at that time was 181,000 on 4/27. She was seen by Dr. Pollack yesterday, plt were found to be 32, repeated later 26. During the hospitalization, she was told she had a RLL pneumonia and was treated with cefdinir and azithromycin. \par \par  [de-identified] : Patient had a menstrual cycle platelets dropped down to 59 rapidly, was started on dexamethasone 2 tablets daily.  Will cut down to 3mg daily.  Will taper to 2mg next week prior to checking bloodwork, and eventually taper to 1.5, 1mg then 0.5mg then 0.25mg prior to ceasing it check platelets.

## 2022-11-30 NOTE — ED PROVIDER NOTE - QRS
[facility]  Nephrology Clinic Note    Chief complaint: Chronic Kidney Disease (RTC, took meds, dyspnea sometimes, w/o complaints)    History of present illness:   Ezra Zhang is a 63 y.o. Black or  male with past medical history of CKD, DM (diagnosed in 2008), heart failure with reduced ejection fraction, treated hepatitis C, dyslipidemia, atrial fibrillation, left hydronephrosis (followed by Urology Clinic), and smoking. Patient presents for follow-up appointment in nephrology clinic today, denies complaints.     Review of Systems  12 point review of systems conducted, negative except as stated in the history of present illness.    Allergies: Patient has No Known Allergies.     Past Medical History:  has a past medical history of Atrial fibrillation, CKD (chronic kidney disease), DM (diabetes mellitus), Dyslipidemia, Heart failure, unspecified, and Smoking.    Procedure History:  has a past surgical history that includes coronary arteriograph; left  heart catherization; Insert / replace / remove pacemaker; and Cardioversion.    Family History: family history includes Cataracts in his mother; Glaucoma in his mother; Heart disease in his mother; Heart failure in his mother; Hypertension in his father; Peripheral vascular disease in his mother.    Social History:  reports that he has been smoking cigarettes. He has a 30.00 pack-year smoking history. He has never used smokeless tobacco. He reports current alcohol use of about 1.0 standard drink per week. He reports that he does not use drugs.    Physical exam  There were no vitals taken for this visit.  General appearance: Patient is in no acute distress.  Skin: No rashes or wounds.  HEENT: PERRLA, EOMI, no scleral icterus, no JVD. Neck is supple.  Chest: Respirations are unlabored. Lungs sounds are clear.   Heart: S1, S2.   Abdomen: Benign.  : Deferred.  Extremities: No edema, peripheral pulses are palpable.   Neuro:  No focal deficits.     Home Medications:    Current Outpatient Medications:     ACCU-CHEK FASTCLIX LANCET DRUM Misc, Apply topically once daily., Disp: , Rfl:     ACCU-CHEK GUIDE TEST STRIPS Strp, USE AS DIRECTED once daily (keep log), Disp: , Rfl:     albuterol-ipratropium (DUO-NEB) 2.5 mg-0.5 mg/3 mL nebulizer solution, Take 3 mLs by nebulization every 6 (six) hours as needed for Wheezing., Disp: 75 mL, Rfl: 6    atorvastatin (LIPITOR) 80 MG tablet, Take 1 tablet (80 mg total) by mouth once daily., Disp: 90 tablet, Rfl: 3    cetirizine (ZYRTEC) 10 MG tablet, Take 1 tablet (10 mg total) by mouth once daily., Disp: 30 tablet, Rfl: 6    clotrimazole-betamethasone 1-0.05% (LOTRISONE) cream, Apply 2 g topically 2 (two) times daily., Disp: , Rfl:     ELIQUIS 5 mg Tab, Take 5 mg by mouth 2 (two) times daily., Disp: , Rfl:     famotidine (PEPCID) 20 MG tablet, Take 1 tablet (20 mg total) by mouth 2 (two) times daily., Disp: 60 tablet, Rfl: 11    fluticasone propion-salmeterol 115-21 mcg/dose (ADVAIR HFA) 115-21 mcg/actuation HFAA inhaler, Inhale 2 puffs into the lungs every 12 (twelve) hours., Disp: 12 g, Rfl: 6    fluticasone propionate (FLONASE ALLERGY RELIEF) 50 mcg/actuation nasal spray, 2 sprays (100 mcg total) by Each Nostril route Daily., Disp: 18 g, Rfl: 6    furosemide (LASIX) 20 MG tablet, Take 1 tablet (20 mg total) by mouth once daily. Refilled per Cardio orders., Disp: 90 tablet, Rfl: 1    glipiZIDE (GLUCOTROL) 5 MG tablet, Take 5 mg by mouth once daily., Disp: , Rfl:     lancets-blood glucose strips 30 gauge Cmpk,  Accucheck blood glucose test strips and lancets, See Instructions, Check CBG once daily and keep log., # 100 EA, 11 Refill(s), Pharmacy: [facility], 185, cm, Height/Length Dosing, 03/08/22 8:10:00 CST, 96, kg, Weight Dosing, 03/08/22 8:10:00 CST, Disp: , Rfl:     lisinopriL (PRINIVIL,ZESTRIL) 5 MG tablet, Take 1 tablet (5 mg total) by mouth once daily., Disp: 90 tablet, Rfl: 3    metoprolol  succinate (TOPROL-XL) 50 MG 24 hr tablet, Take 1 tablet (50 mg total) by mouth 2 (two) times daily., Disp: 180 tablet, Rfl: 3    nicotine (NICODERM CQ) 14 mg/24 hr, Place 1 patch onto the skin once daily., Disp: 28 patch, Rfl: 2    nicotine polacrilex (NICORETTE) 4 MG Gum, Take 1 each (4 mg total) by mouth as needed (smoking urge)., Disp: 170 each, Rfl: 3    promethazine-codeine 6.25-10 mg/5 ml (PHENERGAN WITH CODEINE) 6.25-10 mg/5 mL syrup, Take 5 mLs by mouth every 6 (six) hours as needed for Cough., Disp: 240 mL, Rfl: 1    amiodarone (PACERONE) 200 MG Tab, , Disp: , Rfl:     tamsulosin (FLOMAX) 0.4 mg Cap, Take 1 capsule by mouth once daily., Disp: , Rfl:     Laboratory data  Electrolytes   Lab Results   Component Value Date     11/28/2022    K 4.5 11/28/2022    CHLORIDE 109 (H) 11/28/2022    CALCIUM 9.5 11/28/2022    MG 2.30 04/12/2022    CO2 23 11/28/2022      Renal function    Lab Results   Component Value Date    BUN 15.9 11/28/2022    CREATININE 1.46 (H) 11/28/2022    EGFRNORACEVR 54 11/28/2022      LFTs  Lab Results   Component Value Date    ALKPHOS 111 11/28/2022    AST 40 (H) 11/28/2022    ALT 51 11/28/2022    BILIDIR 0.2 08/25/2021    IBILI 0.20 08/25/2021    BILITOT 0.6 11/28/2022    ALBUMIN 4.3 11/28/2022      DM  Lab Results   Component Value Date    HGBA1C 5.2 03/02/2022    GLUCOSE 77 (L) 11/28/2022      MBD  Lab Results   Component Value Date    VUDIJDFX57IF 27.9 (L) 09/06/2022      Anemia  Lab Results   Component Value Date    WBC 6.9 11/30/2022    HGB 14.8 11/30/2022    HCT 43.8 11/30/2022     11/30/2022    IRON 83 03/27/2018    TIBC 177 (L) 03/27/2018    TRANS 212.0 03/27/2018    FERRITIN 295.1 03/27/2018    FOLATE 19.8 (H) 03/27/2018    ELOZAQNX22 242 03/27/2018       Other  Lab Results   Component Value Date    HIV Nonreactive 01/09/2018    HEPCAB Reactive (A) 08/23/2019    HEPBSURFAG Negative 08/23/2019    HEPBCAB Nonreactive 08/23/2019      Urine studies:  Lab Results   Component  Value Date    COLORUA Colorless (A) 11/28/2022    APPEARANCEUA Clear 11/28/2022    SGUA 1.008 11/28/2022    PHUA 5.0 11/28/2022    PROTEINUA Negative 11/28/2022    GLUCOSEUA Normal 11/28/2022    KETONESUA Negative 11/28/2022    BLOODUA Negative 11/28/2022    NITRITESUA Negative 11/28/2022    LEUKOCYTESUR Negative 11/28/2022    RBCUA None Seen 11/28/2022    WBCUA None Seen 11/28/2022    BACTERIA None Seen 11/28/2022    SQEPUA Rare 11/28/2022    HYALINECASTS 0-2 (A) 09/12/2022    CREATRANDUR 50.1 (L) 11/28/2022    PROTEINURINE 7.1 11/28/2022       Imaging  CT Abdomen and Pelvis W W/O Contrast 4/28/2022:  Images were reviewed in soft tissue, lung, and bone windows.  Exam quality: adequate  Lines/tubes: Right ventricular ICD lead is again partially visualized, unremarkable appearance.  The included lung bases, cardiac chambers, and regional vascular structures are without evidence of acute process or new focal abnormality. No development of pericardial or pleural effusion. Scattered aortoiliac mural calcification is similar to the prior study. The gallbladder and bile ducts, portal vein, and liver parenchyma are  unremarkable. No suspicious abnormality of the pancreas, spleen, or adrenal glands.  Bilateral renal enhancement and contrast excretion are temporally symmetric. Multiple homogeneous, nonenhancing cystic foci are again scattered throughout the bilateral renal parenchyma, with no new or enlarging solid mass or complex cyst identified. There is no evidence of radiodense urolithiasis. As before, there is moderate dilatation of the left central collecting system, as well as similar severely dilated course of the ipsilateral ureter. There is similar gradual tapering to nondilated distal left ureteral appearance just at the level of the UVJ. No significant right hydronephrosis versus or ureteral dilatation is identified. The urinary bladder is mildly dilated, with no focal mural  irregularity or convincing  intraluminal filling defect. The prostate is of similar size and diffuse heterogeneity, with no convincing focal lesion identified.    The included lower esophagus and the stomach are unremarkable. No evidence of high-grade mechanical bowel obstruction or focal gastrointestinal lesion. There is no free intra-abdominal air or fluid. No drainable collections.   Uncomplicated fat-containing umbilical hernia is similar in the interval. There is no evidence of acute or new focal abnormality through the body wall subcutaneous changes tissues or musculature. Degenerative alterations of the included spinal column and bony pelvis are again appreciated, with no acute cortical displacement or evidence  of destructive focal lesion. There are similar early to mid stage changes of bilateral femoral head avascular necrosis, without gross cortical collapse.     IMPRESSION     1. No evidence of radiodense urolithiasis, focal renal or intraureteral mass, or other clear cause for hematuria.    2. Grossly stable appearance of chronic severe left ureteral dilatation, which could be secondary to stable congenital versus acquired abnormality at the level of the UVJ.    3. Multiple bilateral renal cysts are of unchanged size and appearance, consistent with benign etiology (Bosniak 1); no specific imaging follow-up recommended.    4. Additional chronic secondary details discussed above, similar to 2 March, 2021 appearance.  Electronically Signed By: Xavier Wisdom MD  Date/Time Signed: 04/28/2022 16:03     NM KIDNEY W FLOW AND FUNCTION W PHARMACOLOGICAL 10/14/2022  FINDINGS  The radionuclide abdominal angiogram demonstrates symmetric bilateral renal perfusion.  Review of the quantitative analysis reveals left kidney functional contribution of 51%, with the right kidney providing 49%.  The effective renal plasma flow is not calculated for either kidney secondary to technical difficulties at the time of image acquisition and with  software post processing.  The left renal time to peak activity was achieved at 1.2 minutes; peak activity of the right kidney occurred at 1.7 minutes.     Sequential renal images obtained over the course of 30 minutes demonstrates both kidneys to be of grossly normal and symmetric size.  Normal uptake of both kidneys is appreciated, with delayed clearance visualized on the left.  There is no appreciable central photopenia to suggest presence of significant proximal collecting system dilatation.     Minimal pre-diuretic downward trend of the excretory curve is appreciated bilaterally.  Neither kidney exhibits significant diuretic response.  Calculated post diuretic half-time of tracer clearance of the left kidney is 35.6 minutes; the right kidney post-diuretic half time could not be calculated secondary to inadequate post-injection decrease of activity.  There is diffusely dilated appearance of the left ureter, with prolonged activity of contrast within the ipsilateral central collecting system and throughout the course of the ureter.  The right ureter is unremarkable in appearance.     IMPRESSION  1. Bilateral impaired diuretic response.  2. Diffusely dilated left ureter with associated delayed clearance of excreted radiotracer, suggestive of distal ureteral stricture versus partial obstruction.    Impression and plan    Stage 3a chronic kidney disease  -     Comprehensive Metabolic Panel; Future; Expected date: 05/01/2023  -     Protein/Creatinine Ratio, Urine; Future; Expected date: 05/01/2023  -     Urinalysis, Reflex to Urine Culture Urine, Clean Catch; Future; Expected date: 05/01/2023  Renal indices are relatively stable, there is no significant proteinuria. Continue renal sparing activities and periodic monitoring. Continue renal sparing activities:    -Follow low sodium diet (2 grams a day)  -Control high blood pressure ( goal BP < 130/80, please record BP at home every day and bring log to next office  visit)  -Exercise at least 30 minutes a day, 5 days a week.  -Maintain healthy weight.  -Decrease or stop alcohol use  -Do not smoke  -Stay well hydrated  -Receive Pneumovax, Flu, and HBV vaccines if indicated.  -Do not take NSAIDs (Ibuprofen, Naproxen, Aleve, Advil, Toradol, Mobic), may take only Tylenol as needed for pain/headaches.  -Take cholesterol-lowering medications as prescribed (LDL goal <100)    Handout on treatment options for kidney disease provided.  F/U with PCP as scheduled  [facility] with routine labs in 6 months    Primary hypertension  Blood pressure reading is at goal, continue current antihypertensive regimen and 2 g a day dietary sodium restriction.      Hydronephrosis of left kidney  Will follow up with urology.     BMI 28.0-28.9,adult  Lifestyle and dietary interventions discussed, patient counseled on weight loss using portion control, non- sedentary lifestyle, low-carbohydrate/low fat diet.       11/30/2022  Nadira Buchanan NP  [facility]ph[facility]         88

## 2022-12-01 ENCOUNTER — APPOINTMENT (OUTPATIENT)
Dept: HEMATOLOGY ONCOLOGY | Facility: CLINIC | Age: 22
End: 2022-12-01

## 2022-12-09 ENCOUNTER — RESULT REVIEW (OUTPATIENT)
Age: 22
End: 2022-12-09

## 2022-12-09 ENCOUNTER — NON-APPOINTMENT (OUTPATIENT)
Age: 22
End: 2022-12-09

## 2022-12-09 ENCOUNTER — APPOINTMENT (OUTPATIENT)
Dept: HEMATOLOGY ONCOLOGY | Facility: CLINIC | Age: 22
End: 2022-12-09

## 2022-12-09 DIAGNOSIS — D69.3 IMMUNE THROMBOCYTOPENIC PURPURA: ICD-10-CM

## 2022-12-09 LAB
BASOPHILS # BLD AUTO: 0.04 K/UL — SIGNIFICANT CHANGE UP (ref 0–0.2)
BASOPHILS NFR BLD AUTO: 0.6 % — SIGNIFICANT CHANGE UP (ref 0–2)
EOSINOPHIL # BLD AUTO: 0.21 K/UL — SIGNIFICANT CHANGE UP (ref 0–0.5)
EOSINOPHIL NFR BLD AUTO: 2.9 % — SIGNIFICANT CHANGE UP (ref 0–6)
HCT VFR BLD CALC: 39.2 % — SIGNIFICANT CHANGE UP (ref 34.5–45)
HGB BLD-MCNC: 13 G/DL — SIGNIFICANT CHANGE UP (ref 11.5–15.5)
IMM GRANULOCYTES NFR BLD AUTO: 0.3 % — SIGNIFICANT CHANGE UP (ref 0–0.9)
LYMPHOCYTES # BLD AUTO: 2.93 K/UL — SIGNIFICANT CHANGE UP (ref 1–3.3)
LYMPHOCYTES # BLD AUTO: 40.4 % — SIGNIFICANT CHANGE UP (ref 13–44)
MCHC RBC-ENTMCNC: 27 PG — SIGNIFICANT CHANGE UP (ref 27–34)
MCHC RBC-ENTMCNC: 33.2 G/DL — SIGNIFICANT CHANGE UP (ref 32–36)
MCV RBC AUTO: 81.3 FL — SIGNIFICANT CHANGE UP (ref 80–100)
MONOCYTES # BLD AUTO: 0.56 K/UL — SIGNIFICANT CHANGE UP (ref 0–0.9)
MONOCYTES NFR BLD AUTO: 7.7 % — SIGNIFICANT CHANGE UP (ref 2–14)
NEUTROPHILS # BLD AUTO: 3.5 K/UL — SIGNIFICANT CHANGE UP (ref 1.8–7.4)
NEUTROPHILS NFR BLD AUTO: 48.1 % — SIGNIFICANT CHANGE UP (ref 43–77)
NRBC # BLD: 0 /100 WBCS — SIGNIFICANT CHANGE UP (ref 0–0)
PLATELET # BLD AUTO: 66 K/UL — LOW (ref 150–400)
RBC # BLD: 4.82 M/UL — SIGNIFICANT CHANGE UP (ref 3.8–5.2)
RBC # FLD: 13.2 % — SIGNIFICANT CHANGE UP (ref 10.3–14.5)
WBC # BLD: 7.26 K/UL — SIGNIFICANT CHANGE UP (ref 3.8–10.5)
WBC # FLD AUTO: 7.26 K/UL — SIGNIFICANT CHANGE UP (ref 3.8–10.5)

## 2022-12-14 LAB
ALBUMIN SERPL ELPH-MCNC: 4.2 G/DL
ALP BLD-CCNC: 79 U/L
ALT SERPL-CCNC: 11 U/L
ANION GAP SERPL CALC-SCNC: 10 MMOL/L
AST SERPL-CCNC: 14 U/L
BILIRUB SERPL-MCNC: 0.2 MG/DL
BUN SERPL-MCNC: 15 MG/DL
CALCIUM SERPL-MCNC: 9.5 MG/DL
CHLORIDE SERPL-SCNC: 102 MMOL/L
CO2 SERPL-SCNC: 26 MMOL/L
CREAT SERPL-MCNC: 0.7 MG/DL
EGFR: 125 ML/MIN/1.73M2
GLUCOSE SERPL-MCNC: 98 MG/DL
POTASSIUM SERPL-SCNC: 3.9 MMOL/L
PROT SERPL-MCNC: 7.3 G/DL
SODIUM SERPL-SCNC: 139 MMOL/L

## 2022-12-16 ENCOUNTER — APPOINTMENT (OUTPATIENT)
Dept: HEMATOLOGY ONCOLOGY | Facility: CLINIC | Age: 22
End: 2022-12-16

## 2023-02-08 ENCOUNTER — APPOINTMENT (OUTPATIENT)
Dept: DERMATOLOGY | Facility: CLINIC | Age: 23
End: 2023-02-08
Payer: COMMERCIAL

## 2023-02-08 DIAGNOSIS — L85.3 XEROSIS CUTIS: ICD-10-CM

## 2023-02-08 DIAGNOSIS — L30.9 DERMATITIS, UNSPECIFIED: ICD-10-CM

## 2023-02-08 PROCEDURE — 99214 OFFICE O/P EST MOD 30 MIN: CPT

## 2023-02-08 NOTE — HISTORY OF PRESENT ILLNESS
[FreeTextEntry1] : fu eczema  [de-identified] : MINDY SOBORNE is a 23 year old F with history of ITP who presents for f/u as below. \par \par # Eczema\par - on dupixent, started 12/18/2021, notes sig improvement in eczema and itch\par - eczema since childhood\par - flaring on hands, attributes to frequent washing with [employer] job\par - has been using topical betamethasone oint most days for months in different areas as needed with overall improvement \par - using Aveeno wash and lotion to moisturize \par \par Drug Allergies: nkda\par No p/f hx of skin cancer.\par Smoking hx: no\par SocHx: [place]

## 2023-02-08 NOTE — ASSESSMENT
[FreeTextEntry1] : # Atopic dermatitis, improving \par # Xerosis \par - Discussed nature and course of chronic condition.\par - C/w dupixent [id number] (12/2021-present) \par - Apply topical betamethasone augmented 0.05% ointment to AA BID PRN for maximum of 2 weeks on, then take 1 week break before re-using if needed for lower extremities \par - Dry skin care instructions with OTC product recommendations (ex: Cerave, Vaseline) provided. \par \par RTC 1 yr

## 2023-02-08 NOTE — PHYSICAL EXAM
[Alert] : alert [Oriented x 3] : ~L oriented x 3 [Well Nourished] : well nourished [FreeTextEntry3] : - xerosis\par - eczematous plaques on bilateral palms\par

## 2023-02-21 ENCOUNTER — OUTPATIENT (OUTPATIENT)
Dept: OUTPATIENT SERVICES | Facility: HOSPITAL | Age: 23
LOS: 1 days | Discharge: HOME | End: 2023-02-21

## 2023-02-21 DIAGNOSIS — D69.3 IMMUNE THROMBOCYTOPENIC PURPURA: ICD-10-CM

## 2023-02-22 ENCOUNTER — LABORATORY RESULT (OUTPATIENT)
Age: 23
End: 2023-02-22

## 2023-02-22 ENCOUNTER — APPOINTMENT (OUTPATIENT)
Dept: HEMATOLOGY ONCOLOGY | Facility: CLINIC | Age: 23
End: 2023-02-22
Payer: COMMERCIAL

## 2023-02-22 ENCOUNTER — RESULT REVIEW (OUTPATIENT)
Age: 23
End: 2023-02-22

## 2023-02-22 VITALS
OXYGEN SATURATION: 100 % | TEMPERATURE: 97.4 F | RESPIRATION RATE: 16 BRPM | HEART RATE: 84 BPM | WEIGHT: 242.05 LBS | HEIGHT: 62.01 IN | BODY MASS INDEX: 43.98 KG/M2

## 2023-02-22 VITALS — SYSTOLIC BLOOD PRESSURE: 137 MMHG | DIASTOLIC BLOOD PRESSURE: 89 MMHG

## 2023-02-22 LAB
BASOPHILS # BLD AUTO: 0.05 K/UL — SIGNIFICANT CHANGE UP (ref 0–0.2)
BASOPHILS NFR BLD AUTO: 0.7 % — SIGNIFICANT CHANGE UP (ref 0–2)
EOSINOPHIL # BLD AUTO: 0.34 K/UL — SIGNIFICANT CHANGE UP (ref 0–0.5)
EOSINOPHIL NFR BLD AUTO: 4.9 % — SIGNIFICANT CHANGE UP (ref 0–6)
HCT VFR BLD CALC: 39.5 % — SIGNIFICANT CHANGE UP (ref 34.5–45)
HGB BLD-MCNC: 12.9 G/DL — SIGNIFICANT CHANGE UP (ref 11.5–15.5)
IMM GRANULOCYTES NFR BLD AUTO: 0.1 % — SIGNIFICANT CHANGE UP (ref 0–0.9)
LYMPHOCYTES # BLD AUTO: 3.02 K/UL — SIGNIFICANT CHANGE UP (ref 1–3.3)
LYMPHOCYTES # BLD AUTO: 43.6 % — SIGNIFICANT CHANGE UP (ref 13–44)
MCHC RBC-ENTMCNC: 26.9 PG — LOW (ref 27–34)
MCHC RBC-ENTMCNC: 32.7 G/DL — SIGNIFICANT CHANGE UP (ref 32–36)
MCV RBC AUTO: 82.3 FL — SIGNIFICANT CHANGE UP (ref 80–100)
MONOCYTES # BLD AUTO: 0.56 K/UL — SIGNIFICANT CHANGE UP (ref 0–0.9)
MONOCYTES NFR BLD AUTO: 8.1 % — SIGNIFICANT CHANGE UP (ref 2–14)
NEUTROPHILS # BLD AUTO: 2.94 K/UL — SIGNIFICANT CHANGE UP (ref 1.8–7.4)
NEUTROPHILS NFR BLD AUTO: 42.6 % — LOW (ref 43–77)
NRBC # BLD: 0 /100 WBCS — SIGNIFICANT CHANGE UP (ref 0–0)
PLATELET # BLD AUTO: 302 K/UL — SIGNIFICANT CHANGE UP (ref 150–400)
RBC # BLD: 4.8 M/UL — SIGNIFICANT CHANGE UP (ref 3.8–5.2)
RBC # FLD: 13.8 % — SIGNIFICANT CHANGE UP (ref 10.3–14.5)
WBC # BLD: 6.92 K/UL — SIGNIFICANT CHANGE UP (ref 3.8–10.5)
WBC # FLD AUTO: 6.92 K/UL — SIGNIFICANT CHANGE UP (ref 3.8–10.5)

## 2023-02-22 PROCEDURE — 99214 OFFICE O/P EST MOD 30 MIN: CPT

## 2023-02-22 RX ORDER — DEXAMETHASONE 2 MG/1
2 TABLET ORAL DAILY
Qty: 60 | Refills: 0 | Status: DISCONTINUED | COMMUNITY
Start: 2022-11-18 | End: 2023-02-22

## 2023-02-22 RX ORDER — FAMOTIDINE 20 MG/1
20 TABLET, FILM COATED ORAL TWICE DAILY
Qty: 120 | Refills: 0 | Status: DISCONTINUED | COMMUNITY
Start: 2020-05-19 | End: 2023-02-22

## 2023-02-22 NOTE — ASSESSMENT
[FreeTextEntry1] : This is a 22 year old female with ITP. Initially diagnosed at [facility] in the end of April- no records. \par She proceeded to have a tumultuous course often relapsing at prednisone doses less than 20 mg.  \par We were finally able to wean her off the prednisone this year, plates normal since April 2021.  \par \par Patient without any thrombocytopenia since April 2021. platelets in the 200s or better then had a drop to 125 which we did not make much of an issue of before dropping to 59 at her workplace.  Was 66 at the time of her visit in november.  Was started on a very brief taper of dexamethasone 2mg.  Platelets 192 at her own office on FS on 2/17/23, but patient also noticed unexplained bruising without trauma to her right calf X 2 locations. \par Doesn ot appear to be related to covid, had a mild COVID 19 episode 3 weeks prior to that episode.  Will run bleeding workup including a PT/PTT and VWF Ag and ACT, along with factor VIII activity to screen for acquired von Willebrand.  Recheck CBC though it was recently 192  at her own office on 2/17.  If testing normal suspect it may have been use of betamethasone for exema at that same location.    These high strength steroid creams can sometimes cause skin fragility with excess use.

## 2023-02-22 NOTE — PHYSICAL EXAM
[de-identified] : Right leg 2 hematomas one on the lower leg about 4 cm and upper lateral calf about 6 cm wide.

## 2023-02-22 NOTE — HISTORY OF PRESENT ILLNESS
[de-identified] : This is a 23 year old female with a history of eczema/asthma who is here for an evaluation of thrombocytopenia. She was admitted to [facility] for 3 days at the end of April for ITP. She noticed petechiae on her chest wall, leg, mouth bleeding, longer menstrual cycle (9 days instead of 4- not more heavy). She was found to have low plt, unknown level. She was given a platelet transfusion followed by IVIG. She was seen by hematology, unknown who. She was not started on any steroids. She was discharged, called her PCP, Dr. Pollack the following day for recurrent oral bleeding. She was seen at [facility], plt at that time was 181,000 on 4/27. She was seen by Dr. Pollack yesterday, plt were found to be 32, repeated later 26. During the hospitalization, she was told she had a RLL pneumonia and was treated with cefdinir and azithromycin. \par \par  [de-identified] : Had bruising on the right leg this past week on 2/17, works at a medical office, she did her own CBC that day and it was 192 up from 66 in December,.  \par on 2/17 right when bruising happened.  She does recall using betamethasone cream to this same area for eczema.  \par feels well otherwise.  Did have COVID again right after her birthday.  Only took Tylenol, consciously avoided all the OTC's with NSAIDS in them.  \par Was on dexamethasone back in December, now off of this again for the better part of a month.  Betamethasone cream.  \par \par Does not remember any injury.

## 2023-02-24 ENCOUNTER — NON-APPOINTMENT (OUTPATIENT)
Age: 23
End: 2023-02-24

## 2023-02-24 ENCOUNTER — OUTPATIENT (OUTPATIENT)
Dept: OUTPATIENT SERVICES | Facility: HOSPITAL | Age: 23
LOS: 1 days | End: 2023-02-24

## 2023-02-24 ENCOUNTER — APPOINTMENT (OUTPATIENT)
Dept: INTERNAL MEDICINE | Facility: CLINIC | Age: 23
End: 2023-02-24

## 2023-02-28 LAB
ALBUMIN SERPL ELPH-MCNC: 4.3 G/DL
ALP BLD-CCNC: 87 U/L
ALT SERPL-CCNC: 12 U/L
ANION GAP SERPL CALC-SCNC: 11 MMOL/L
APTT BLD: 33.1 SEC
AST SERPL-CCNC: 17 U/L
BILIRUB INDIRECT SERPL-MCNC: 0.2 MG/DL
BILIRUB SERPL-MCNC: 0.3 MG/DL
BUN SERPL-MCNC: 16 MG/DL
CALCIUM SERPL-MCNC: 10.3 MG/DL
CHLORIDE SERPL-SCNC: 102 MMOL/L
CO2 SERPL-SCNC: 26 MMOL/L
CREAT SERPL-MCNC: 0.63 MG/DL
EGFR: 128 ML/MIN/1.73M2
FACT VIII ACT/NOR PPP: 116 %
FERRITIN SERPL-MCNC: 48 NG/ML
FOLATE SERPL-MCNC: 8.4 NG/ML
GLUCOSE SERPL-MCNC: 87 MG/DL
HAPTOGLOB SERPL-MCNC: 203 MG/DL
INR PPP: 1.05 RATIO
IRON SATN MFR SERPL: 13 %
IRON SERPL-MCNC: 45 UG/DL
LDH SERPL-CCNC: 224 U/L
POTASSIUM SERPL-SCNC: 4.5 MMOL/L
PROT SERPL-MCNC: 7.8 G/DL
PT BLD: 12.3 SEC
SODIUM SERPL-SCNC: 139 MMOL/L
TIBC SERPL-MCNC: 342 UG/DL
UIBC SERPL-MCNC: 296 UG/DL
VIT B12 SERPL-MCNC: 546 PG/ML
VWF AG PPP IA-ACNC: 125 %
VWF:RCO ACT/NOR PPP PL AGG: 93 %

## 2023-03-08 ENCOUNTER — APPOINTMENT (OUTPATIENT)
Dept: DERMATOLOGY | Facility: CLINIC | Age: 23
End: 2023-03-08

## 2023-03-08 ENCOUNTER — APPOINTMENT (OUTPATIENT)
Dept: OBGYN | Facility: CLINIC | Age: 23
End: 2023-03-08
Payer: COMMERCIAL

## 2023-03-08 PROCEDURE — 56820 COLPOSCOPY VULVA: CPT

## 2023-03-08 PROCEDURE — 99213 OFFICE O/P EST LOW 20 MIN: CPT | Mod: 25

## 2023-03-20 LAB — VWF MULTIMERS PPP IA-ACNC: NORMAL

## 2023-05-12 DIAGNOSIS — D69.6 THROMBOCYTOPENIA, UNSPECIFIED: ICD-10-CM

## 2023-05-16 ENCOUNTER — OUTPATIENT (OUTPATIENT)
Dept: OUTPATIENT SERVICES | Facility: HOSPITAL | Age: 23
LOS: 1 days | Discharge: HOME | End: 2023-05-16

## 2023-05-16 DIAGNOSIS — D69.3 IMMUNE THROMBOCYTOPENIC PURPURA: ICD-10-CM

## 2023-05-18 ENCOUNTER — NON-APPOINTMENT (OUTPATIENT)
Age: 23
End: 2023-05-18

## 2023-05-18 ENCOUNTER — APPOINTMENT (OUTPATIENT)
Dept: HEMATOLOGY ONCOLOGY | Facility: CLINIC | Age: 23
End: 2023-05-18

## 2023-05-18 RX ORDER — TACROLIMUS 1 MG/G
0.1 OINTMENT TOPICAL
Qty: 1 | Refills: 4 | Status: ACTIVE | COMMUNITY
Start: 2021-11-04 | End: 1900-01-01

## 2023-08-21 ENCOUNTER — OUTPATIENT (OUTPATIENT)
Dept: OUTPATIENT SERVICES | Facility: HOSPITAL | Age: 23
LOS: 1 days | End: 2023-08-21

## 2023-08-21 ENCOUNTER — NON-APPOINTMENT (OUTPATIENT)
Age: 23
End: 2023-08-21

## 2023-08-21 ENCOUNTER — APPOINTMENT (OUTPATIENT)
Dept: INTERNAL MEDICINE | Facility: CLINIC | Age: 23
End: 2023-08-21

## 2023-08-29 NOTE — HISTORY OF PRESENT ILLNESS
[FreeTextEntry1] : Nicole is a 21yo female who presents today for follow-up. She was recently diagnosed with ITP and PNA. She completed abx and last CXR found clear lungs. She is currently following with hematology Dr. Urrutia. Patient reports she is doing well, she reports some continued chest discomfort but states this very minimal. Patient reports she was recently admitted to [facility] for platelet count of 7. Patient was admitted from 5/20-5/22, received two treatments of IVIG 75gm and PO Decadron with improvement in thrombocytopenia (plts 490. Patient had recent blood work done with Dr. Urrutia and most recent platelet count of 448.
Negative

## 2023-11-03 ENCOUNTER — APPOINTMENT (OUTPATIENT)
Dept: INTERNAL MEDICINE | Facility: CLINIC | Age: 23
End: 2023-11-03
Payer: COMMERCIAL

## 2023-11-03 VITALS
TEMPERATURE: 97.9 F | WEIGHT: 241 LBS | HEART RATE: 81 BPM | SYSTOLIC BLOOD PRESSURE: 141 MMHG | BODY MASS INDEX: 43.79 KG/M2 | OXYGEN SATURATION: 98 % | HEIGHT: 62.01 IN | DIASTOLIC BLOOD PRESSURE: 85 MMHG

## 2023-11-03 DIAGNOSIS — F32.A DEPRESSION, UNSPECIFIED: ICD-10-CM

## 2023-11-03 DIAGNOSIS — Z23 ENCOUNTER FOR IMMUNIZATION: ICD-10-CM

## 2023-11-03 DIAGNOSIS — Z00.00 ENCOUNTER FOR GENERAL ADULT MEDICAL EXAMINATION W/OUT ABNORMAL FINDINGS: ICD-10-CM

## 2023-11-03 DIAGNOSIS — D64.9 ANEMIA, UNSPECIFIED: ICD-10-CM

## 2023-11-03 DIAGNOSIS — E55.9 VITAMIN D DEFICIENCY, UNSPECIFIED: ICD-10-CM

## 2023-11-03 DIAGNOSIS — R05.9 COUGH, UNSPECIFIED: ICD-10-CM

## 2023-11-03 DIAGNOSIS — R63.5 ABNORMAL WEIGHT GAIN: ICD-10-CM

## 2023-11-03 PROCEDURE — 99395 PREV VISIT EST AGE 18-39: CPT | Mod: 25

## 2023-11-03 PROCEDURE — G0444 DEPRESSION SCREEN ANNUAL: CPT | Mod: 59

## 2023-11-03 PROCEDURE — 90715 TDAP VACCINE 7 YRS/> IM: CPT

## 2023-11-03 PROCEDURE — 90471 IMMUNIZATION ADMIN: CPT

## 2023-11-03 PROCEDURE — 99214 OFFICE O/P EST MOD 30 MIN: CPT | Mod: 25

## 2023-11-03 RX ORDER — NITROFURANTOIN (MONOHYDRATE/MACROCRYSTALS) 25; 75 MG/1; MG/1
100 CAPSULE ORAL
Qty: 10 | Refills: 0 | Status: DISCONTINUED | COMMUNITY
Start: 2023-05-24

## 2023-11-03 RX ORDER — BENZONATATE 200 MG/1
200 CAPSULE ORAL
Qty: 20 | Refills: 0 | Status: DISCONTINUED | COMMUNITY
Start: 2023-05-09

## 2023-11-03 RX ORDER — ALBUTEROL SULFATE 90 UG/1
108 (90 BASE) INHALANT RESPIRATORY (INHALATION)
Qty: 18 | Refills: 0 | Status: DISCONTINUED | COMMUNITY
Start: 2023-05-08

## 2023-11-03 RX ORDER — CEFPODOXIME PROXETIL 100 MG/1
100 TABLET, FILM COATED ORAL
Qty: 14 | Refills: 0 | Status: DISCONTINUED | COMMUNITY
Start: 2023-05-29

## 2023-11-03 RX ORDER — AMOXICILLIN 875 MG/1
875 TABLET, FILM COATED ORAL
Qty: 20 | Refills: 0 | Status: DISCONTINUED | COMMUNITY
Start: 2023-10-23

## 2023-11-03 RX ORDER — ALBUTEROL SULFATE 2.5 MG/3ML
(2.5 MG/3ML) SOLUTION RESPIRATORY (INHALATION)
Qty: 150 | Refills: 0 | Status: DISCONTINUED | COMMUNITY
Start: 2023-05-09

## 2023-11-06 LAB
25(OH)D3 SERPL-MCNC: 14.3 NG/ML
ALBUMIN SERPL ELPH-MCNC: 4.4 G/DL
ALP BLD-CCNC: 81 U/L
ALT SERPL-CCNC: 16 U/L
ANION GAP SERPL CALC-SCNC: 15 MMOL/L
AST SERPL-CCNC: 17 U/L
BILIRUB SERPL-MCNC: 0.2 MG/DL
BUN SERPL-MCNC: 9 MG/DL
CALCIUM SERPL-MCNC: 9.7 MG/DL
CHLORIDE SERPL-SCNC: 101 MMOL/L
CHOLEST SERPL-MCNC: 182 MG/DL
CO2 SERPL-SCNC: 23 MMOL/L
CREAT SERPL-MCNC: 0.66 MG/DL
EGFR: 126 ML/MIN/1.73M2
ESTIMATED AVERAGE GLUCOSE: 111 MG/DL
FERRITIN SERPL-MCNC: 45 NG/ML
FOLATE SERPL-MCNC: 6.6 NG/ML
GLUCOSE SERPL-MCNC: 79 MG/DL
HBA1C MFR BLD HPLC: 5.5 %
HCT VFR BLD CALC: 39.5 %
HDLC SERPL-MCNC: 90 MG/DL
HGB BLD-MCNC: 13.1 G/DL
IRON SATN MFR SERPL: 9 %
IRON SERPL-MCNC: 32 UG/DL
LDLC SERPL CALC-MCNC: 82 MG/DL
MCHC RBC-ENTMCNC: 27.1 PG
MCHC RBC-ENTMCNC: 33.2 GM/DL
MCV RBC AUTO: 81.8 FL
NONHDLC SERPL-MCNC: 92 MG/DL
PLATELET # BLD AUTO: 285 K/UL
POTASSIUM SERPL-SCNC: 4.2 MMOL/L
PROT SERPL-MCNC: 7.8 G/DL
RBC # BLD: 4.83 M/UL
RBC # FLD: 13.7 %
SODIUM SERPL-SCNC: 138 MMOL/L
TIBC SERPL-MCNC: 338 UG/DL
TRIGL SERPL-MCNC: 48 MG/DL
TSH SERPL-ACNC: 0.79 UIU/ML
UIBC SERPL-MCNC: 306 UG/DL
VIT B12 SERPL-MCNC: 722 PG/ML
WBC # FLD AUTO: 6.43 K/UL

## 2023-11-08 PROBLEM — R63.5 WEIGHT GAIN: Status: ACTIVE | Noted: 2023-11-08

## 2023-11-08 PROBLEM — R05.9 COUGH: Status: ACTIVE | Noted: 2023-11-08

## 2023-12-06 ENCOUNTER — APPOINTMENT (OUTPATIENT)
Dept: OBGYN | Facility: CLINIC | Age: 23
End: 2023-12-06
Payer: COMMERCIAL

## 2023-12-06 PROCEDURE — 99395 PREV VISIT EST AGE 18-39: CPT

## 2023-12-06 PROCEDURE — 81002 URINALYSIS NONAUTO W/O SCOPE: CPT

## 2023-12-07 RX ORDER — BETAMETHASONE DIPROPIONATE 0.5 MG/G
0.05 OINTMENT, AUGMENTED TOPICAL
Qty: 50 | Refills: 1 | Status: ACTIVE | COMMUNITY
Start: 2021-12-08 | End: 1900-01-01

## 2023-12-21 ENCOUNTER — APPOINTMENT (OUTPATIENT)
Dept: DERMATOLOGY | Facility: CLINIC | Age: 23
End: 2023-12-21

## 2024-01-19 ENCOUNTER — APPOINTMENT (OUTPATIENT)
Dept: DERMATOLOGY | Facility: CLINIC | Age: 24
End: 2024-01-19
Payer: COMMERCIAL

## 2024-01-19 DIAGNOSIS — L73.9 FOLLICULAR DISORDER, UNSPECIFIED: ICD-10-CM

## 2024-01-19 PROCEDURE — 99214 OFFICE O/P EST MOD 30 MIN: CPT

## 2024-01-19 RX ORDER — IVERMECTIN 10 MG/G
1 CREAM TOPICAL
Qty: 1 | Refills: 0 | Status: ACTIVE | COMMUNITY
Start: 2024-01-19 | End: 1900-01-01

## 2024-01-19 RX ORDER — KETOCONAZOLE 20 MG/G
2 CREAM TOPICAL
Qty: 1 | Refills: 3 | Status: ACTIVE | COMMUNITY
Start: 2024-01-19 | End: 1900-01-01

## 2024-01-26 ENCOUNTER — NON-APPOINTMENT (OUTPATIENT)
Age: 24
End: 2024-01-26

## 2024-01-26 PROBLEM — L73.9 FOLLICULITIS: Status: ACTIVE | Noted: 2024-01-19

## 2024-01-26 RX ORDER — DOXYCYCLINE HYCLATE 100 MG/1
100 TABLET ORAL TWICE DAILY
Qty: 28 | Refills: 0 | Status: ACTIVE | COMMUNITY
Start: 2024-01-26 | End: 1900-01-01

## 2024-02-09 ENCOUNTER — APPOINTMENT (OUTPATIENT)
Dept: INTERNAL MEDICINE | Facility: CLINIC | Age: 24
End: 2024-02-09

## 2024-02-09 ENCOUNTER — OUTPATIENT (OUTPATIENT)
Dept: OUTPATIENT SERVICES | Facility: HOSPITAL | Age: 24
LOS: 1 days | End: 2024-02-09

## 2024-02-14 ENCOUNTER — APPOINTMENT (OUTPATIENT)
Dept: ENDOCRINOLOGY | Facility: CLINIC | Age: 24
End: 2024-02-14
Payer: COMMERCIAL

## 2024-02-14 VITALS
HEART RATE: 72 BPM | BODY MASS INDEX: 43.44 KG/M2 | SYSTOLIC BLOOD PRESSURE: 112 MMHG | OXYGEN SATURATION: 99 % | WEIGHT: 236.06 LBS | HEIGHT: 62 IN | DIASTOLIC BLOOD PRESSURE: 84 MMHG | TEMPERATURE: 98.7 F

## 2024-02-14 DIAGNOSIS — G47.33 OBSTRUCTIVE SLEEP APNEA (ADULT) (PEDIATRIC): ICD-10-CM

## 2024-02-14 DIAGNOSIS — E66.01 MORBID (SEVERE) OBESITY DUE TO EXCESS CALORIES: ICD-10-CM

## 2024-02-14 PROCEDURE — 99204 OFFICE O/P NEW MOD 45 MIN: CPT

## 2024-02-14 NOTE — HISTORY OF PRESENT ILLNESS
[FreeTextEntry1] : 24 year F here for assessment of obesity   Follows a special diet: None Food Cravings: No  Tried to lose weight before: Yes   Tried any diet plans/ meal replacements for weight control: No Tried OTC or prescription medication for weight control: No Activity level: typically light activity with no scheduled physical activity, limited by ITP  Ease of skin bruising: related to ITP Proximal muscle weakness: No Prior weight loss surgery: No     History of the following:   Thyroid disease: No Heart disease: No Mood disorder: No Hypertension: No Seizures: No Gall bladder disease: No Known diabetic retinopathy: No Pancreatitis: No

## 2024-02-16 ENCOUNTER — APPOINTMENT (OUTPATIENT)
Dept: DERMATOLOGY | Facility: CLINIC | Age: 24
End: 2024-02-16

## 2024-02-16 ENCOUNTER — RX RENEWAL (OUTPATIENT)
Age: 24
End: 2024-02-16

## 2024-02-16 RX ORDER — DUPILUMAB 300 MG/2ML
INJECTION, SOLUTION SUBCUTANEOUS
Qty: 1 | Refills: 10 | Status: ACTIVE | COMMUNITY
Start: 2021-11-04 | End: 1900-01-01

## 2024-07-08 ENCOUNTER — OUTPATIENT (OUTPATIENT)
Dept: OUTPATIENT SERVICES | Facility: HOSPITAL | Age: 24
LOS: 1 days | Discharge: HOME | End: 2024-07-08

## 2024-07-08 ENCOUNTER — RESULT REVIEW (OUTPATIENT)
Age: 24
End: 2024-07-08

## 2024-07-08 ENCOUNTER — APPOINTMENT (OUTPATIENT)
Dept: HEMATOLOGY ONCOLOGY | Facility: CLINIC | Age: 24
End: 2024-07-08

## 2024-07-08 DIAGNOSIS — D69.3 IMMUNE THROMBOCYTOPENIC PURPURA: ICD-10-CM

## 2024-07-08 LAB
BASOPHILS # BLD AUTO: 0.06 K/UL — SIGNIFICANT CHANGE UP (ref 0–0.2)
BASOPHILS NFR BLD AUTO: 0.8 % — SIGNIFICANT CHANGE UP (ref 0–2)
BURR CELLS BLD QL SMEAR: PRESENT — SIGNIFICANT CHANGE UP
EOSINOPHIL # BLD AUTO: 0.26 K/UL — SIGNIFICANT CHANGE UP (ref 0–0.5)
EOSINOPHIL NFR BLD AUTO: 3.3 % — SIGNIFICANT CHANGE UP (ref 0–6)
FERRITIN SERPL-MCNC: 30 NG/ML
FOLATE SERPL-MCNC: 9.4 NG/ML
GIANT PLATELETS BLD QL SMEAR: PRESENT — SIGNIFICANT CHANGE UP
HCT VFR BLD CALC: 38.1 % — SIGNIFICANT CHANGE UP (ref 34.5–45)
HGB BLD-MCNC: 12.9 G/DL — SIGNIFICANT CHANGE UP (ref 11.5–15.5)
IMM GRANULOCYTES NFR BLD AUTO: 0.1 % — SIGNIFICANT CHANGE UP (ref 0–0.9)
IRON SATN MFR SERPL: 11 %
IRON SERPL-MCNC: 36 UG/DL
LG PLATELETS BLD QL AUTO: SLIGHT — SIGNIFICANT CHANGE UP
LYMPHOCYTES # BLD AUTO: 3.47 K/UL — HIGH (ref 1–3.3)
LYMPHOCYTES # BLD AUTO: 43.5 % — SIGNIFICANT CHANGE UP (ref 13–44)
MCHC RBC-ENTMCNC: 28 PG — SIGNIFICANT CHANGE UP (ref 27–34)
MCHC RBC-ENTMCNC: 33.9 G/DL — SIGNIFICANT CHANGE UP (ref 32–36)
MCV RBC AUTO: 82.8 FL — SIGNIFICANT CHANGE UP (ref 80–100)
MONOCYTES # BLD AUTO: 0.63 K/UL — SIGNIFICANT CHANGE UP (ref 0–0.9)
MONOCYTES NFR BLD AUTO: 7.9 % — SIGNIFICANT CHANGE UP (ref 2–14)
NEUTROPHILS # BLD AUTO: 3.54 K/UL — SIGNIFICANT CHANGE UP (ref 1.8–7.4)
NEUTROPHILS NFR BLD AUTO: 44.4 % — SIGNIFICANT CHANGE UP (ref 43–77)
NRBC # BLD: 0 /100 WBCS — SIGNIFICANT CHANGE UP (ref 0–0)
NRBC BLD-RTO: 0 /100 WBCS — SIGNIFICANT CHANGE UP (ref 0–0)
PLAT MORPH BLD: ABNORMAL
PLATELET # BLD AUTO: 46 K/UL — LOW (ref 150–400)
POIKILOCYTOSIS BLD QL AUTO: SLIGHT — SIGNIFICANT CHANGE UP
RBC # BLD: 4.6 M/UL — SIGNIFICANT CHANGE UP (ref 3.8–5.2)
RBC # FLD: 14.1 % — SIGNIFICANT CHANGE UP (ref 10.3–14.5)
RBC BLD AUTO: ABNORMAL
UIBC SERPL-MCNC: 310 UG/DL
WBC # BLD: 7.97 K/UL — SIGNIFICANT CHANGE UP (ref 3.8–10.5)
WBC # FLD AUTO: 7.97 K/UL — SIGNIFICANT CHANGE UP (ref 3.8–10.5)

## 2024-07-09 DIAGNOSIS — D69.3 IMMUNE THROMBOCYTOPENIC PURPURA: ICD-10-CM

## 2024-07-09 RX ORDER — FERROUS GLUCONATE 324(38)MG
324 (38 FE) TABLET ORAL DAILY
Qty: 30 | Refills: 3 | Status: ACTIVE | COMMUNITY
Start: 2024-07-09 | End: 1900-01-01

## 2024-07-15 ENCOUNTER — NON-APPOINTMENT (OUTPATIENT)
Age: 24
End: 2024-07-15

## 2024-07-16 ENCOUNTER — APPOINTMENT (OUTPATIENT)
Dept: HEMATOLOGY ONCOLOGY | Facility: CLINIC | Age: 24
End: 2024-07-16
Payer: COMMERCIAL

## 2024-07-16 ENCOUNTER — RESULT REVIEW (OUTPATIENT)
Age: 24
End: 2024-07-16

## 2024-07-16 VITALS
TEMPERATURE: 98 F | BODY MASS INDEX: 43.26 KG/M2 | WEIGHT: 238.1 LBS | HEIGHT: 62.01 IN | DIASTOLIC BLOOD PRESSURE: 81 MMHG | HEART RATE: 77 BPM | RESPIRATION RATE: 16 BRPM | OXYGEN SATURATION: 99 % | SYSTOLIC BLOOD PRESSURE: 131 MMHG

## 2024-07-16 DIAGNOSIS — D69.3 IMMUNE THROMBOCYTOPENIC PURPURA: ICD-10-CM

## 2024-07-16 LAB
BASOPHILS # BLD AUTO: 0.04 K/UL — SIGNIFICANT CHANGE UP (ref 0–0.2)
BASOPHILS NFR BLD AUTO: 0.5 % — SIGNIFICANT CHANGE UP (ref 0–2)
EOSINOPHIL # BLD AUTO: 0.09 K/UL — SIGNIFICANT CHANGE UP (ref 0–0.5)
EOSINOPHIL NFR BLD AUTO: 1.1 % — SIGNIFICANT CHANGE UP (ref 0–6)
FERRITIN SERPL-MCNC: 25 NG/ML
FOLATE SERPL-MCNC: 5.7 NG/ML
HCT VFR BLD CALC: 39.1 % — SIGNIFICANT CHANGE UP (ref 34.5–45)
HGB BLD-MCNC: 13 G/DL — SIGNIFICANT CHANGE UP (ref 11.5–15.5)
IMM GRANULOCYTES NFR BLD AUTO: 0.4 % — SIGNIFICANT CHANGE UP (ref 0–0.9)
IRON SATN MFR SERPL: 11 %
IRON SERPL-MCNC: 36 UG/DL
LYMPHOCYTES # BLD AUTO: 3.5 K/UL — HIGH (ref 1–3.3)
LYMPHOCYTES # BLD AUTO: 43.3 % — SIGNIFICANT CHANGE UP (ref 13–44)
MCHC RBC-ENTMCNC: 28.1 PG — SIGNIFICANT CHANGE UP (ref 27–34)
MCHC RBC-ENTMCNC: 33.2 G/DL — SIGNIFICANT CHANGE UP (ref 32–36)
MCV RBC AUTO: 84.6 FL — SIGNIFICANT CHANGE UP (ref 80–100)
MONOCYTES # BLD AUTO: 0.66 K/UL — SIGNIFICANT CHANGE UP (ref 0–0.9)
MONOCYTES NFR BLD AUTO: 8.2 % — SIGNIFICANT CHANGE UP (ref 2–14)
NEUTROPHILS # BLD AUTO: 3.76 K/UL — SIGNIFICANT CHANGE UP (ref 1.8–7.4)
NEUTROPHILS NFR BLD AUTO: 46.5 % — SIGNIFICANT CHANGE UP (ref 43–77)
NRBC # BLD: 0 /100 WBCS — SIGNIFICANT CHANGE UP (ref 0–0)
NRBC BLD-RTO: 0 /100 WBCS — SIGNIFICANT CHANGE UP (ref 0–0)
PLATELET # BLD AUTO: 39 K/UL — LOW (ref 150–400)
RBC # BLD: 4.62 M/UL — SIGNIFICANT CHANGE UP (ref 3.8–5.2)
RBC # FLD: 13.9 % — SIGNIFICANT CHANGE UP (ref 10.3–14.5)
RETICS #: 79.5 K/UL — SIGNIFICANT CHANGE UP (ref 25–125)
RETICS/RBC NFR: 1.7 % — SIGNIFICANT CHANGE UP (ref 0.5–2.5)
TIBC SERPL-MCNC: 319 UG/DL
UIBC SERPL-MCNC: 283 UG/DL
VIT B12 SERPL-MCNC: 345 PG/ML
WBC # BLD: 8.08 K/UL — SIGNIFICANT CHANGE UP (ref 3.8–10.5)
WBC # FLD AUTO: 8.08 K/UL — SIGNIFICANT CHANGE UP (ref 3.8–10.5)

## 2024-07-16 PROCEDURE — 99214 OFFICE O/P EST MOD 30 MIN: CPT

## 2024-07-25 ENCOUNTER — EMERGENCY (EMERGENCY)
Facility: HOSPITAL | Age: 24
LOS: 1 days | Discharge: HOME | End: 2024-07-25
Attending: PERSONAL EMERGENCY RESPONSE ATTENDANT
Payer: COMMERCIAL

## 2024-07-25 ENCOUNTER — RESULT REVIEW (OUTPATIENT)
Age: 24
End: 2024-07-25

## 2024-07-25 ENCOUNTER — APPOINTMENT (OUTPATIENT)
Dept: HEMATOLOGY ONCOLOGY | Facility: CLINIC | Age: 24
End: 2024-07-25

## 2024-07-25 ENCOUNTER — APPOINTMENT (OUTPATIENT)
Dept: INFUSION THERAPY | Facility: HOSPITAL | Age: 24
End: 2024-07-25

## 2024-07-25 VITALS
HEART RATE: 108 BPM | TEMPERATURE: 98 F | SYSTOLIC BLOOD PRESSURE: 124 MMHG | OXYGEN SATURATION: 99 % | RESPIRATION RATE: 18 BRPM | DIASTOLIC BLOOD PRESSURE: 84 MMHG

## 2024-07-25 VITALS
TEMPERATURE: 98 F | HEART RATE: 101 BPM | HEIGHT: 62.01 IN | RESPIRATION RATE: 18 BRPM | WEIGHT: 238.1 LBS | DIASTOLIC BLOOD PRESSURE: 85 MMHG | SYSTOLIC BLOOD PRESSURE: 123 MMHG | OXYGEN SATURATION: 98 %

## 2024-07-25 DIAGNOSIS — D69.3 IMMUNE THROMBOCYTOPENIC PURPURA: ICD-10-CM

## 2024-07-25 LAB
ALBUMIN SERPL ELPH-MCNC: 3.8 G/DL — SIGNIFICANT CHANGE UP (ref 3.3–5)
ALP SERPL-CCNC: 77 U/L — SIGNIFICANT CHANGE UP (ref 40–120)
ALT FLD-CCNC: 11 U/L — SIGNIFICANT CHANGE UP (ref 10–45)
ANION GAP SERPL CALC-SCNC: 13 MMOL/L — SIGNIFICANT CHANGE UP (ref 5–17)
APPEARANCE UR: ABNORMAL
AST SERPL-CCNC: 17 U/L — SIGNIFICANT CHANGE UP (ref 10–40)
BACTERIA # UR AUTO: ABNORMAL /HPF
BASE EXCESS BLDV CALC-SCNC: -2.8 MMOL/L — LOW (ref -2–3)
BASOPHILS # BLD AUTO: 0.04 K/UL — SIGNIFICANT CHANGE UP (ref 0–0.2)
BASOPHILS # BLD AUTO: 0.04 K/UL — SIGNIFICANT CHANGE UP (ref 0–0.2)
BASOPHILS NFR BLD AUTO: 0.2 % — SIGNIFICANT CHANGE UP (ref 0–2)
BASOPHILS NFR BLD AUTO: 0.7 % — SIGNIFICANT CHANGE UP (ref 0–2)
BILIRUB SERPL-MCNC: 0.2 MG/DL — SIGNIFICANT CHANGE UP (ref 0.2–1.2)
BILIRUB UR-MCNC: NEGATIVE — SIGNIFICANT CHANGE UP
BUN SERPL-MCNC: 14 MG/DL — SIGNIFICANT CHANGE UP (ref 7–23)
CA-I SERPL-SCNC: 1.23 MMOL/L — SIGNIFICANT CHANGE UP (ref 1.15–1.33)
CALCIUM SERPL-MCNC: 9.3 MG/DL — SIGNIFICANT CHANGE UP (ref 8.4–10.5)
CAST: 2 /LPF — SIGNIFICANT CHANGE UP (ref 0–4)
CHLORIDE BLDV-SCNC: 105 MMOL/L — SIGNIFICANT CHANGE UP (ref 96–108)
CHLORIDE SERPL-SCNC: 105 MMOL/L — SIGNIFICANT CHANGE UP (ref 96–108)
CO2 BLDV-SCNC: 27 MMOL/L — HIGH (ref 22–26)
CO2 SERPL-SCNC: 21 MMOL/L — LOW (ref 22–31)
COLOR SPEC: ABNORMAL
CREAT SERPL-MCNC: 0.67 MG/DL — SIGNIFICANT CHANGE UP (ref 0.5–1.3)
DIFF PNL FLD: ABNORMAL
EGFR: 125 ML/MIN/1.73M2 — SIGNIFICANT CHANGE UP
EOSINOPHIL # BLD AUTO: 0.01 K/UL — SIGNIFICANT CHANGE UP (ref 0–0.5)
EOSINOPHIL # BLD AUTO: 0.09 K/UL — SIGNIFICANT CHANGE UP (ref 0–0.5)
EOSINOPHIL NFR BLD AUTO: 0.1 % — SIGNIFICANT CHANGE UP (ref 0–6)
EOSINOPHIL NFR BLD AUTO: 1.5 % — SIGNIFICANT CHANGE UP (ref 0–6)
GAS PNL BLDV: 138 MMOL/L — SIGNIFICANT CHANGE UP (ref 136–145)
GAS PNL BLDV: SIGNIFICANT CHANGE UP
GAS PNL BLDV: SIGNIFICANT CHANGE UP
GLUCOSE BLDV-MCNC: 92 MG/DL — SIGNIFICANT CHANGE UP (ref 70–99)
GLUCOSE SERPL-MCNC: 91 MG/DL — SIGNIFICANT CHANGE UP (ref 70–99)
GLUCOSE UR QL: NEGATIVE MG/DL — SIGNIFICANT CHANGE UP
HCG SERPL-ACNC: <2 MIU/ML — SIGNIFICANT CHANGE UP
HCO3 BLDV-SCNC: 25 MMOL/L — SIGNIFICANT CHANGE UP (ref 22–29)
HCT VFR BLD CALC: 37.3 % — SIGNIFICANT CHANGE UP (ref 34.5–45)
HCT VFR BLD CALC: 38.2 % — SIGNIFICANT CHANGE UP (ref 34.5–45)
HCT VFR BLDA CALC: 39 % — SIGNIFICANT CHANGE UP (ref 34.5–46.5)
HGB BLD CALC-MCNC: 13.1 G/DL — SIGNIFICANT CHANGE UP (ref 11.7–16.1)
HGB BLD-MCNC: 12.4 G/DL — SIGNIFICANT CHANGE UP (ref 11.5–15.5)
HGB BLD-MCNC: 12.4 G/DL — SIGNIFICANT CHANGE UP (ref 11.5–15.5)
IMM GRANULOCYTES NFR BLD AUTO: 0.3 % — SIGNIFICANT CHANGE UP (ref 0–0.9)
IMM GRANULOCYTES NFR BLD AUTO: 0.6 % — SIGNIFICANT CHANGE UP (ref 0–0.9)
KETONES UR-MCNC: ABNORMAL MG/DL
LACTATE BLDV-MCNC: 2.7 MMOL/L — HIGH (ref 0.5–2)
LEUKOCYTE ESTERASE UR-ACNC: NEGATIVE — SIGNIFICANT CHANGE UP
LIDOCAIN IGE QN: 14 U/L — SIGNIFICANT CHANGE UP (ref 7–60)
LYMPHOCYTES # BLD AUTO: 0.44 K/UL — LOW (ref 1–3.3)
LYMPHOCYTES # BLD AUTO: 2.37 K/UL — SIGNIFICANT CHANGE UP (ref 1–3.3)
LYMPHOCYTES # BLD AUTO: 2.4 % — LOW (ref 13–44)
LYMPHOCYTES # BLD AUTO: 38.9 % — SIGNIFICANT CHANGE UP (ref 13–44)
MCHC RBC-ENTMCNC: 27.5 PG — SIGNIFICANT CHANGE UP (ref 27–34)
MCHC RBC-ENTMCNC: 27.6 PG — SIGNIFICANT CHANGE UP (ref 27–34)
MCHC RBC-ENTMCNC: 32.5 GM/DL — SIGNIFICANT CHANGE UP (ref 32–36)
MCHC RBC-ENTMCNC: 33.2 G/DL — SIGNIFICANT CHANGE UP (ref 32–36)
MCV RBC AUTO: 82.7 FL — SIGNIFICANT CHANGE UP (ref 80–100)
MCV RBC AUTO: 84.9 FL — SIGNIFICANT CHANGE UP (ref 80–100)
MONOCYTES # BLD AUTO: 0.55 K/UL — SIGNIFICANT CHANGE UP (ref 0–0.9)
MONOCYTES # BLD AUTO: 0.59 K/UL — SIGNIFICANT CHANGE UP (ref 0–0.9)
MONOCYTES NFR BLD AUTO: 3.3 % — SIGNIFICANT CHANGE UP (ref 2–14)
MONOCYTES NFR BLD AUTO: 9 % — SIGNIFICANT CHANGE UP (ref 2–14)
NEUTROPHILS # BLD AUTO: 16.85 K/UL — HIGH (ref 1.8–7.4)
NEUTROPHILS # BLD AUTO: 3.02 K/UL — SIGNIFICANT CHANGE UP (ref 1.8–7.4)
NEUTROPHILS NFR BLD AUTO: 49.6 % — SIGNIFICANT CHANGE UP (ref 43–77)
NEUTROPHILS NFR BLD AUTO: 93.4 % — HIGH (ref 43–77)
NITRITE UR-MCNC: NEGATIVE — SIGNIFICANT CHANGE UP
NRBC # BLD: 0 /100 WBCS — SIGNIFICANT CHANGE UP (ref 0–0)
NRBC # BLD: 0 /100 WBCS — SIGNIFICANT CHANGE UP (ref 0–0)
NRBC BLD-RTO: 0 /100 WBCS — SIGNIFICANT CHANGE UP (ref 0–0)
PCO2 BLDV: 56 MMHG — HIGH (ref 39–42)
PH BLDV: 7.26 — LOW (ref 7.32–7.43)
PH UR: 6 — SIGNIFICANT CHANGE UP (ref 5–8)
PLATELET # BLD AUTO: 22 K/UL — LOW (ref 150–400)
PLATELET # BLD AUTO: 23 K/UL — LOW (ref 150–400)
PO2 BLDV: 27 MMHG — SIGNIFICANT CHANGE UP (ref 25–45)
POTASSIUM BLDV-SCNC: 4.4 MMOL/L — SIGNIFICANT CHANGE UP (ref 3.5–5.1)
POTASSIUM SERPL-MCNC: 4.3 MMOL/L — SIGNIFICANT CHANGE UP (ref 3.5–5.3)
POTASSIUM SERPL-SCNC: 4.3 MMOL/L — SIGNIFICANT CHANGE UP (ref 3.5–5.3)
PROT SERPL-MCNC: 7.2 G/DL — SIGNIFICANT CHANGE UP (ref 6–8.3)
PROT UR-MCNC: 30 MG/DL
RBC # BLD: 4.5 M/UL — SIGNIFICANT CHANGE UP (ref 3.8–5.2)
RBC # BLD: 4.51 M/UL — SIGNIFICANT CHANGE UP (ref 3.8–5.2)
RBC # FLD: 13.4 % — SIGNIFICANT CHANGE UP (ref 10.3–14.5)
RBC # FLD: 13.4 % — SIGNIFICANT CHANGE UP (ref 10.3–14.5)
RBC CASTS # UR COMP ASSIST: 21 /HPF — HIGH (ref 0–4)
REVIEW: SIGNIFICANT CHANGE UP
SAO2 % BLDV: 37.6 % — LOW (ref 67–88)
SODIUM SERPL-SCNC: 139 MMOL/L — SIGNIFICANT CHANGE UP (ref 135–145)
SP GR SPEC: 1.01 — SIGNIFICANT CHANGE UP (ref 1–1.03)
SQUAMOUS # UR AUTO: 9 /HPF — HIGH (ref 0–5)
TROPONIN T, HIGH SENSITIVITY RESULT: <6 NG/L — SIGNIFICANT CHANGE UP (ref 0–51)
UROBILINOGEN FLD QL: 0.2 MG/DL — SIGNIFICANT CHANGE UP (ref 0.2–1)
WBC # BLD: 18.04 K/UL — HIGH (ref 3.8–10.5)
WBC # BLD: 6.09 K/UL — SIGNIFICANT CHANGE UP (ref 3.8–10.5)
WBC # FLD AUTO: 18.04 K/UL — HIGH (ref 3.8–10.5)
WBC # FLD AUTO: 6.09 K/UL — SIGNIFICANT CHANGE UP (ref 3.8–10.5)
WBC UR QL: 21 /HPF — HIGH (ref 0–5)

## 2024-07-25 PROCEDURE — 96374 THER/PROPH/DIAG INJ IV PUSH: CPT | Mod: XU

## 2024-07-25 PROCEDURE — 36415 COLL VENOUS BLD VENIPUNCTURE: CPT

## 2024-07-25 PROCEDURE — 84295 ASSAY OF SERUM SODIUM: CPT

## 2024-07-25 PROCEDURE — 93975 VASCULAR STUDY: CPT | Mod: 26

## 2024-07-25 PROCEDURE — 83605 ASSAY OF LACTIC ACID: CPT

## 2024-07-25 PROCEDURE — 81001 URINALYSIS AUTO W/SCOPE: CPT

## 2024-07-25 PROCEDURE — 93975 VASCULAR STUDY: CPT

## 2024-07-25 PROCEDURE — 96375 TX/PRO/DX INJ NEW DRUG ADDON: CPT

## 2024-07-25 PROCEDURE — 82803 BLOOD GASES ANY COMBINATION: CPT

## 2024-07-25 PROCEDURE — 87086 URINE CULTURE/COLONY COUNT: CPT

## 2024-07-25 PROCEDURE — 76856 US EXAM PELVIC COMPLETE: CPT | Mod: 26,59

## 2024-07-25 PROCEDURE — 85025 COMPLETE CBC W/AUTO DIFF WBC: CPT

## 2024-07-25 PROCEDURE — 82435 ASSAY OF BLOOD CHLORIDE: CPT

## 2024-07-25 PROCEDURE — 85014 HEMATOCRIT: CPT

## 2024-07-25 PROCEDURE — 84132 ASSAY OF SERUM POTASSIUM: CPT

## 2024-07-25 PROCEDURE — 82947 ASSAY GLUCOSE BLOOD QUANT: CPT

## 2024-07-25 PROCEDURE — 84484 ASSAY OF TROPONIN QUANT: CPT

## 2024-07-25 PROCEDURE — 84702 CHORIONIC GONADOTROPIN TEST: CPT

## 2024-07-25 PROCEDURE — 76830 TRANSVAGINAL US NON-OB: CPT | Mod: 26

## 2024-07-25 PROCEDURE — 74177 CT ABD & PELVIS W/CONTRAST: CPT | Mod: MC

## 2024-07-25 PROCEDURE — 76856 US EXAM PELVIC COMPLETE: CPT

## 2024-07-25 PROCEDURE — 83690 ASSAY OF LIPASE: CPT

## 2024-07-25 PROCEDURE — 99285 EMERGENCY DEPT VISIT HI MDM: CPT

## 2024-07-25 PROCEDURE — 80053 COMPREHEN METABOLIC PANEL: CPT

## 2024-07-25 PROCEDURE — 74177 CT ABD & PELVIS W/CONTRAST: CPT | Mod: 26,MC

## 2024-07-25 PROCEDURE — 76830 TRANSVAGINAL US NON-OB: CPT

## 2024-07-25 PROCEDURE — 85018 HEMOGLOBIN: CPT

## 2024-07-25 PROCEDURE — 82330 ASSAY OF CALCIUM: CPT

## 2024-07-25 PROCEDURE — 99284 EMERGENCY DEPT VISIT MOD MDM: CPT | Mod: 25

## 2024-07-25 RX ORDER — SODIUM CHLORIDE 0.9 % (FLUSH) 0.9 %
1000 SYRINGE (ML) INJECTION ONCE
Refills: 0 | Status: COMPLETED | OUTPATIENT
Start: 2024-07-25 | End: 2024-07-25

## 2024-07-25 RX ORDER — CEFTRIAXONE SODIUM 500 MG
1000 VIAL (EA) INJECTION ONCE
Refills: 0 | Status: COMPLETED | OUTPATIENT
Start: 2024-07-25 | End: 2024-07-25

## 2024-07-25 RX ORDER — ACETAMINOPHEN 325 MG
1000 TABLET ORAL ONCE
Refills: 0 | Status: COMPLETED | OUTPATIENT
Start: 2024-07-25 | End: 2024-07-25

## 2024-07-25 RX ORDER — CEFPODOXIME PROXETIL 50 MG/5 ML
1 SUSPENSION, RECONSTITUTED, ORAL (ML) ORAL
Qty: 14 | Refills: 0
Start: 2024-07-25 | End: 2024-07-31

## 2024-07-25 RX ADMIN — Medication 400 MILLIGRAM(S): at 14:49

## 2024-07-25 RX ADMIN — Medication 1000 MILLILITER(S): at 14:50

## 2024-07-25 RX ADMIN — Medication 100 MILLIGRAM(S): at 18:40

## 2024-07-25 NOTE — ED PROVIDER NOTE - ATTENDING CONTRIBUTION TO CARE
Attending MD Roberts:  I performed a history and physical exam of the patient and discussed their management with the resident. I reviewed the resident's note and agree with the documented findings and plan of care. My medical decision making and observations are found above.

## 2024-07-25 NOTE — ED ADULT NURSE NOTE - OBJECTIVE STATEMENT
Pt came in c/o abdominal pain after receiving "chemo" for her ITP. Pt has a hx of asthma and eczema. No distress Breathing easy and non labored. Pt is ambulatory. Pt well-appearing. No chills. No diaphoresis

## 2024-07-25 NOTE — ED PROVIDER NOTE - PATIENT PORTAL LINK FT
You can access the FollowMyHealth Patient Portal offered by [facility] by registering at the following website: [email or web address]. By joining [facility]’s FollowMyHealth portal, you will also be able to view your health information using other applications (apps) compatible with our system.

## 2024-07-25 NOTE — ED PROVIDER NOTE - NEUROLOGICAL, MLM
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 Last session was 5 days ago tuesday. concern for hyperkalemia, tachyarrythmia, hyperglycemia, hyponatremia. plan labs covid tba for HD
Alert and oriented, no focal deficits, no motor or sensory deficits.

## 2024-07-25 NOTE — ED PROVIDER NOTE - PROGRESS NOTE DETAILS
kalyn- Patient reassessed noting improvement in abdominal pain.  Reviewed results with patient including negative acute findings on imaging.  Patient with positive urinalysis.  Will discharge on course of cefpodoxime.

## 2024-07-25 NOTE — ED ADULT NURSE NOTE - NS ED NURSE IV DC DT
Patient is a 89 years old female without past psych hx, no hx of TROY, no hx of SA/violence, with hx of dementia ( as per chart review), delusions ( thinks that she was raped by her aides) for the past 6 months, on risperidone and trazodone for 1-2 weeks for poor sleep and delusions, and medical hx of HTN, HLD, hypothyroid was admitted on 4/21 for unresponsive episode 4/21 at 911am while sitting down for am breakfast. Pt slumped over in the chair, was not responding, eyes closing, would open then when called but would not respond/follow commands, just stare off, no shaking, lasted 15 min, no prior episodes.  No seizure history, no prior strokes.  Psychiatry consulted for med management. Pt did not have any episodes of agitation since the admission to the hospital.  Pt was seen at her bedside with her son Gabriel present. Pt received Haldol 1.5 mg PRN since her admission, last dose at 2 am on 4/22.  Pt was laying in bed with her eyes closed, but opened them briefly when instructed to do so. Pt oriented x 1 ( think that she is in her home, think that the writer is "my dear friend, doctor", date is "May or August) with poor attention span ( unable to site days of the week in sequence), waxing and waning. Tangential thought process. Attempting to answer questions, but goes on a tangent. Unable to engage in a meaningful conversation. No signs of catatonia.   Collateral from patient's son: patient has not been officially diagnosed with dementia, however, lives with 24/7 aides for the past year and her . 6 months ago became delusional. 1 week ago pt was seen by geriatric psychiatrist and started on Trazodone and risperidone. Was brought o the hospital because see passed out. Denied hx of mental health issues, substance use, SA/violence. Reported that she was fully independent until recently. 25-Jul-2024 19:57

## 2024-07-25 NOTE — ED PROVIDER NOTE - NSICDXNOPASTSURGICALHX_GEN_ALL_ED
Pharmacy keeps faxing us refill requests, however I can see that they filled it on 11/25 when it was originally sent with 5 refills. I faxed pharmacy back.    <-- Click to add NO significant Past Surgical History

## 2024-07-25 NOTE — ED PROVIDER NOTE - NSFOLLOWUPINSTRUCTIONS_ED_ALL_ED_FT
You are seen in the emergency department with abdominal pain.  You had blood work and imaging performed which did not show any acute abnormalities.  Your urinalysis came back positive for bacteria which indicates urinary tract infection.  You have been sent a prescription for an antibiotic which you should take as directed on the prescription for 7 days.  Do not stop taking the antibiotic even if your symptoms resolve return to the emergency department for persistent or new symptoms.

## 2024-07-26 DIAGNOSIS — R11.2 NAUSEA WITH VOMITING, UNSPECIFIED: ICD-10-CM

## 2024-07-26 DIAGNOSIS — Z51.11 ENCOUNTER FOR ANTINEOPLASTIC CHEMOTHERAPY: ICD-10-CM

## 2024-07-27 LAB
CULTURE RESULTS: SIGNIFICANT CHANGE UP
SPECIMEN SOURCE: SIGNIFICANT CHANGE UP

## 2024-08-02 ENCOUNTER — APPOINTMENT (OUTPATIENT)
Dept: INFUSION THERAPY | Facility: HOSPITAL | Age: 24
End: 2024-08-02

## 2024-08-02 ENCOUNTER — RESULT REVIEW (OUTPATIENT)
Age: 24
End: 2024-08-02

## 2024-08-02 ENCOUNTER — APPOINTMENT (OUTPATIENT)
Dept: HEMATOLOGY ONCOLOGY | Facility: CLINIC | Age: 24
End: 2024-08-02

## 2024-08-02 LAB
BASOPHILS # BLD AUTO: 0.04 K/UL — SIGNIFICANT CHANGE UP (ref 0–0.2)
BASOPHILS NFR BLD AUTO: 0.6 % — SIGNIFICANT CHANGE UP (ref 0–2)
EOSINOPHIL # BLD AUTO: 0.27 K/UL — SIGNIFICANT CHANGE UP (ref 0–0.5)
EOSINOPHIL NFR BLD AUTO: 4 % — SIGNIFICANT CHANGE UP (ref 0–6)
HCT VFR BLD CALC: 37.6 % — SIGNIFICANT CHANGE UP (ref 34.5–45)
HGB BLD-MCNC: 12.8 G/DL — SIGNIFICANT CHANGE UP (ref 11.5–15.5)
IMM GRANULOCYTES NFR BLD AUTO: 0.3 % — SIGNIFICANT CHANGE UP (ref 0–0.9)
LYMPHOCYTES # BLD AUTO: 1.81 K/UL — SIGNIFICANT CHANGE UP (ref 1–3.3)
LYMPHOCYTES # BLD AUTO: 26.9 % — SIGNIFICANT CHANGE UP (ref 13–44)
MCHC RBC-ENTMCNC: 27.7 PG — SIGNIFICANT CHANGE UP (ref 27–34)
MCHC RBC-ENTMCNC: 34 G/DL — SIGNIFICANT CHANGE UP (ref 32–36)
MCV RBC AUTO: 81.4 FL — SIGNIFICANT CHANGE UP (ref 80–100)
MONOCYTES # BLD AUTO: 0.6 K/UL — SIGNIFICANT CHANGE UP (ref 0–0.9)
MONOCYTES NFR BLD AUTO: 8.9 % — SIGNIFICANT CHANGE UP (ref 2–14)
NEUTROPHILS # BLD AUTO: 3.98 K/UL — SIGNIFICANT CHANGE UP (ref 1.8–7.4)
NEUTROPHILS NFR BLD AUTO: 59.3 % — SIGNIFICANT CHANGE UP (ref 43–77)
NRBC # BLD: 0 /100 WBCS — SIGNIFICANT CHANGE UP (ref 0–0)
NRBC BLD-RTO: 0 /100 WBCS — SIGNIFICANT CHANGE UP (ref 0–0)
PLATELET # BLD AUTO: 42 K/UL — LOW (ref 150–400)
RBC # BLD: 4.62 M/UL — SIGNIFICANT CHANGE UP (ref 3.8–5.2)
RBC # FLD: 13.4 % — SIGNIFICANT CHANGE UP (ref 10.3–14.5)
WBC # BLD: 6.72 K/UL — SIGNIFICANT CHANGE UP (ref 3.8–10.5)
WBC # FLD AUTO: 6.72 K/UL — SIGNIFICANT CHANGE UP (ref 3.8–10.5)

## 2024-08-05 ENCOUNTER — NON-APPOINTMENT (OUTPATIENT)
Age: 24
End: 2024-08-05

## 2024-08-09 ENCOUNTER — APPOINTMENT (OUTPATIENT)
Dept: INFUSION THERAPY | Facility: HOSPITAL | Age: 24
End: 2024-08-09

## 2024-08-09 ENCOUNTER — OUTPATIENT (OUTPATIENT)
Dept: OUTPATIENT SERVICES | Facility: HOSPITAL | Age: 24
LOS: 1 days | End: 2024-08-09

## 2024-08-09 ENCOUNTER — RESULT REVIEW (OUTPATIENT)
Age: 24
End: 2024-08-09

## 2024-08-09 ENCOUNTER — APPOINTMENT (OUTPATIENT)
Dept: HEMATOLOGY ONCOLOGY | Facility: CLINIC | Age: 24
End: 2024-08-09

## 2024-08-09 ENCOUNTER — APPOINTMENT (OUTPATIENT)
Dept: INTERNAL MEDICINE | Facility: CLINIC | Age: 24
End: 2024-08-09

## 2024-08-09 LAB
BASOPHILS # BLD AUTO: 0.05 K/UL — SIGNIFICANT CHANGE UP (ref 0–0.2)
BASOPHILS NFR BLD AUTO: 1 % — SIGNIFICANT CHANGE UP (ref 0–2)
EOSINOPHIL # BLD AUTO: 0.22 K/UL — SIGNIFICANT CHANGE UP (ref 0–0.5)
EOSINOPHIL NFR BLD AUTO: 4.2 % — SIGNIFICANT CHANGE UP (ref 0–6)
HCT VFR BLD CALC: 37.4 % — SIGNIFICANT CHANGE UP (ref 34.5–45)
HGB BLD-MCNC: 12.7 G/DL — SIGNIFICANT CHANGE UP (ref 11.5–15.5)
IMM GRANULOCYTES NFR BLD AUTO: 0.2 % — SIGNIFICANT CHANGE UP (ref 0–0.9)
LYMPHOCYTES # BLD AUTO: 1.56 K/UL — SIGNIFICANT CHANGE UP (ref 1–3.3)
LYMPHOCYTES # BLD AUTO: 29.8 % — SIGNIFICANT CHANGE UP (ref 13–44)
MCHC RBC-ENTMCNC: 27.9 PG — SIGNIFICANT CHANGE UP (ref 27–34)
MCHC RBC-ENTMCNC: 34 G/DL — SIGNIFICANT CHANGE UP (ref 32–36)
MCV RBC AUTO: 82.2 FL — SIGNIFICANT CHANGE UP (ref 80–100)
MONOCYTES # BLD AUTO: 0.39 K/UL — SIGNIFICANT CHANGE UP (ref 0–0.9)
MONOCYTES NFR BLD AUTO: 7.4 % — SIGNIFICANT CHANGE UP (ref 2–14)
NEUTROPHILS # BLD AUTO: 3.01 K/UL — SIGNIFICANT CHANGE UP (ref 1.8–7.4)
NEUTROPHILS NFR BLD AUTO: 57.4 % — SIGNIFICANT CHANGE UP (ref 43–77)
NRBC # BLD: 0 /100 WBCS — SIGNIFICANT CHANGE UP (ref 0–0)
NRBC BLD-RTO: 0 /100 WBCS — SIGNIFICANT CHANGE UP (ref 0–0)
PLATELET # BLD AUTO: 32 K/UL — LOW (ref 150–400)
RBC # BLD: 4.55 M/UL — SIGNIFICANT CHANGE UP (ref 3.8–5.2)
RBC # FLD: 13.6 % — SIGNIFICANT CHANGE UP (ref 10.3–14.5)
WBC # BLD: 5.24 K/UL — SIGNIFICANT CHANGE UP (ref 3.8–10.5)
WBC # FLD AUTO: 5.24 K/UL — SIGNIFICANT CHANGE UP (ref 3.8–10.5)

## 2024-08-16 ENCOUNTER — RESULT REVIEW (OUTPATIENT)
Age: 24
End: 2024-08-16

## 2024-08-16 ENCOUNTER — APPOINTMENT (OUTPATIENT)
Dept: HEMATOLOGY ONCOLOGY | Facility: CLINIC | Age: 24
End: 2024-08-16

## 2024-08-16 ENCOUNTER — APPOINTMENT (OUTPATIENT)
Dept: INFUSION THERAPY | Facility: HOSPITAL | Age: 24
End: 2024-08-16

## 2024-08-16 DIAGNOSIS — D69.3 IMMUNE THROMBOCYTOPENIC PURPURA: ICD-10-CM

## 2024-08-16 LAB
BASOPHILS # BLD AUTO: 0.05 K/UL — SIGNIFICANT CHANGE UP (ref 0–0.2)
BASOPHILS # BLD AUTO: 0.06 K/UL — SIGNIFICANT CHANGE UP (ref 0–0.2)
BASOPHILS NFR BLD AUTO: 1 % — SIGNIFICANT CHANGE UP (ref 0–2)
BASOPHILS NFR BLD AUTO: 1 % — SIGNIFICANT CHANGE UP (ref 0–2)
EOSINOPHIL # BLD AUTO: 0.12 K/UL — SIGNIFICANT CHANGE UP (ref 0–0.5)
EOSINOPHIL # BLD AUTO: 0.12 K/UL — SIGNIFICANT CHANGE UP (ref 0–0.5)
EOSINOPHIL NFR BLD AUTO: 2.1 % — SIGNIFICANT CHANGE UP (ref 0–6)
EOSINOPHIL NFR BLD AUTO: 2.4 % — SIGNIFICANT CHANGE UP (ref 0–6)
HCT VFR BLD CALC: 39.4 % — SIGNIFICANT CHANGE UP (ref 34.5–45)
HCT VFR BLD CALC: 40 % — SIGNIFICANT CHANGE UP (ref 34.5–45)
HGB BLD-MCNC: 13.1 G/DL — SIGNIFICANT CHANGE UP (ref 11.5–15.5)
HGB BLD-MCNC: 13.7 G/DL — SIGNIFICANT CHANGE UP (ref 11.5–15.5)
IMM GRANULOCYTES NFR BLD AUTO: 0.2 % — SIGNIFICANT CHANGE UP (ref 0–0.9)
IMM GRANULOCYTES NFR BLD AUTO: 1.4 % — HIGH (ref 0–0.9)
LYMPHOCYTES # BLD AUTO: 1.58 K/UL — SIGNIFICANT CHANGE UP (ref 1–3.3)
LYMPHOCYTES # BLD AUTO: 1.95 K/UL — SIGNIFICANT CHANGE UP (ref 1–3.3)
LYMPHOCYTES # BLD AUTO: 31 % — SIGNIFICANT CHANGE UP (ref 13–44)
LYMPHOCYTES # BLD AUTO: 33.3 % — SIGNIFICANT CHANGE UP (ref 13–44)
MCHC RBC-ENTMCNC: 27.8 PG — SIGNIFICANT CHANGE UP (ref 27–34)
MCHC RBC-ENTMCNC: 27.9 PG — SIGNIFICANT CHANGE UP (ref 27–34)
MCHC RBC-ENTMCNC: 33.2 G/DL — SIGNIFICANT CHANGE UP (ref 32–36)
MCHC RBC-ENTMCNC: 34.3 G/DL — SIGNIFICANT CHANGE UP (ref 32–36)
MCV RBC AUTO: 81.5 FL — SIGNIFICANT CHANGE UP (ref 80–100)
MCV RBC AUTO: 83.5 FL — SIGNIFICANT CHANGE UP (ref 80–100)
MONOCYTES # BLD AUTO: 0.6 K/UL — SIGNIFICANT CHANGE UP (ref 0–0.9)
MONOCYTES # BLD AUTO: 0.62 K/UL — SIGNIFICANT CHANGE UP (ref 0–0.9)
MONOCYTES NFR BLD AUTO: 10.6 % — SIGNIFICANT CHANGE UP (ref 2–14)
MONOCYTES NFR BLD AUTO: 11.8 % — SIGNIFICANT CHANGE UP (ref 2–14)
NEUTROPHILS # BLD AUTO: 2.74 K/UL — SIGNIFICANT CHANGE UP (ref 1.8–7.4)
NEUTROPHILS # BLD AUTO: 3.02 K/UL — SIGNIFICANT CHANGE UP (ref 1.8–7.4)
NEUTROPHILS NFR BLD AUTO: 51.6 % — SIGNIFICANT CHANGE UP (ref 43–77)
NEUTROPHILS NFR BLD AUTO: 53.6 % — SIGNIFICANT CHANGE UP (ref 43–77)
NRBC # BLD: 0 /100 WBCS — SIGNIFICANT CHANGE UP (ref 0–0)
NRBC # BLD: 0 /100 WBCS — SIGNIFICANT CHANGE UP (ref 0–0)
NRBC BLD-RTO: 0 /100 WBCS — SIGNIFICANT CHANGE UP (ref 0–0)
NRBC BLD-RTO: 0 /100 WBCS — SIGNIFICANT CHANGE UP (ref 0–0)
PLATELET # BLD AUTO: 44 K/UL — LOW (ref 150–400)
PLATELET # BLD AUTO: 45 K/UL — LOW (ref 150–400)
RBC # BLD: 4.72 M/UL — SIGNIFICANT CHANGE UP (ref 3.8–5.2)
RBC # BLD: 4.91 M/UL — SIGNIFICANT CHANGE UP (ref 3.8–5.2)
RBC # FLD: 13.3 % — SIGNIFICANT CHANGE UP (ref 10.3–14.5)
RBC # FLD: 13.3 % — SIGNIFICANT CHANGE UP (ref 10.3–14.5)
WBC # BLD: 5.1 K/UL — SIGNIFICANT CHANGE UP (ref 3.8–10.5)
WBC # BLD: 5.85 K/UL — SIGNIFICANT CHANGE UP (ref 3.8–10.5)
WBC # FLD AUTO: 5.1 K/UL — SIGNIFICANT CHANGE UP (ref 3.8–10.5)
WBC # FLD AUTO: 5.85 K/UL — SIGNIFICANT CHANGE UP (ref 3.8–10.5)

## 2024-08-27 ENCOUNTER — RESULT REVIEW (OUTPATIENT)
Age: 24
End: 2024-08-27

## 2024-08-27 ENCOUNTER — APPOINTMENT (OUTPATIENT)
Dept: HEMATOLOGY ONCOLOGY | Facility: CLINIC | Age: 24
End: 2024-08-27

## 2024-08-27 LAB
BASOPHILS # BLD AUTO: 0.05 K/UL — SIGNIFICANT CHANGE UP (ref 0–0.2)
BASOPHILS NFR BLD AUTO: 0.8 % — SIGNIFICANT CHANGE UP (ref 0–2)
EOSINOPHIL # BLD AUTO: 0.19 K/UL — SIGNIFICANT CHANGE UP (ref 0–0.5)
EOSINOPHIL NFR BLD AUTO: 2.9 % — SIGNIFICANT CHANGE UP (ref 0–6)
HCT VFR BLD CALC: 38.3 % — SIGNIFICANT CHANGE UP (ref 34.5–45)
HGB BLD-MCNC: 12.9 G/DL — SIGNIFICANT CHANGE UP (ref 11.5–15.5)
IMM GRANULOCYTES NFR BLD AUTO: 0.3 % — SIGNIFICANT CHANGE UP (ref 0–0.9)
LYMPHOCYTES # BLD AUTO: 1.93 K/UL — SIGNIFICANT CHANGE UP (ref 1–3.3)
LYMPHOCYTES # BLD AUTO: 29.5 % — SIGNIFICANT CHANGE UP (ref 13–44)
MCHC RBC-ENTMCNC: 28 PG — SIGNIFICANT CHANGE UP (ref 27–34)
MCHC RBC-ENTMCNC: 33.7 G/DL — SIGNIFICANT CHANGE UP (ref 32–36)
MCV RBC AUTO: 83.1 FL — SIGNIFICANT CHANGE UP (ref 80–100)
MONOCYTES # BLD AUTO: 0.54 K/UL — SIGNIFICANT CHANGE UP (ref 0–0.9)
MONOCYTES NFR BLD AUTO: 8.2 % — SIGNIFICANT CHANGE UP (ref 2–14)
NEUTROPHILS # BLD AUTO: 3.82 K/UL — SIGNIFICANT CHANGE UP (ref 1.8–7.4)
NEUTROPHILS NFR BLD AUTO: 58.3 % — SIGNIFICANT CHANGE UP (ref 43–77)
NRBC # BLD: 0 /100 WBCS — SIGNIFICANT CHANGE UP (ref 0–0)
NRBC BLD-RTO: 0 /100 WBCS — SIGNIFICANT CHANGE UP (ref 0–0)
PLATELET # BLD AUTO: 79 K/UL — LOW (ref 150–400)
RBC # BLD: 4.61 M/UL — SIGNIFICANT CHANGE UP (ref 3.8–5.2)
RBC # FLD: 13.6 % — SIGNIFICANT CHANGE UP (ref 10.3–14.5)
WBC # BLD: 6.55 K/UL — SIGNIFICANT CHANGE UP (ref 3.8–10.5)
WBC # FLD AUTO: 6.55 K/UL — SIGNIFICANT CHANGE UP (ref 3.8–10.5)

## 2024-09-01 ENCOUNTER — OUTPATIENT (OUTPATIENT)
Dept: OUTPATIENT SERVICES | Facility: HOSPITAL | Age: 24
LOS: 1 days | Discharge: HOME | End: 2024-09-01

## 2024-09-01 DIAGNOSIS — D69.3 IMMUNE THROMBOCYTOPENIC PURPURA: ICD-10-CM

## 2024-09-10 ENCOUNTER — APPOINTMENT (OUTPATIENT)
Dept: HEMATOLOGY ONCOLOGY | Facility: CLINIC | Age: 24
End: 2024-09-10

## 2024-09-18 ENCOUNTER — RESULT REVIEW (OUTPATIENT)
Age: 24
End: 2024-09-18

## 2024-09-18 ENCOUNTER — APPOINTMENT (OUTPATIENT)
Dept: HEMATOLOGY ONCOLOGY | Facility: CLINIC | Age: 24
End: 2024-09-18
Payer: COMMERCIAL

## 2024-09-18 VITALS
TEMPERATURE: 98.3 F | WEIGHT: 237.2 LBS | SYSTOLIC BLOOD PRESSURE: 125 MMHG | DIASTOLIC BLOOD PRESSURE: 84 MMHG | RESPIRATION RATE: 16 BRPM | OXYGEN SATURATION: 98 % | HEART RATE: 75 BPM

## 2024-09-18 LAB
ALBUMIN SERPL ELPH-MCNC: 4.2 G/DL
ALP BLD-CCNC: 87 U/L
ALT SERPL-CCNC: 10 U/L
ANION GAP SERPL CALC-SCNC: 13 MMOL/L
AST SERPL-CCNC: 15 U/L
BASOPHILS # BLD AUTO: 0.05 K/UL — SIGNIFICANT CHANGE UP (ref 0–0.2)
BASOPHILS NFR BLD AUTO: 0.8 % — SIGNIFICANT CHANGE UP (ref 0–2)
BILIRUB SERPL-MCNC: 0.2 MG/DL
BUN SERPL-MCNC: 14 MG/DL
CALCIUM SERPL-MCNC: 9.4 MG/DL
CHLORIDE SERPL-SCNC: 104 MMOL/L
CO2 SERPL-SCNC: 23 MMOL/L
CREAT SERPL-MCNC: 0.64 MG/DL
EGFR: 126 ML/MIN/1.73M2
EOSINOPHIL # BLD AUTO: 0.39 K/UL — SIGNIFICANT CHANGE UP (ref 0–0.5)
EOSINOPHIL NFR BLD AUTO: 6.4 % — HIGH (ref 0–6)
FERRITIN SERPL-MCNC: 31 NG/ML
FOLATE SERPL-MCNC: 8.3 NG/ML
GLUCOSE SERPL-MCNC: 101 MG/DL
HCT VFR BLD CALC: 38.9 % — SIGNIFICANT CHANGE UP (ref 34.5–45)
HGB BLD-MCNC: 13.1 G/DL — SIGNIFICANT CHANGE UP (ref 11.5–15.5)
IMM GRANULOCYTES NFR BLD AUTO: 0.2 % — SIGNIFICANT CHANGE UP (ref 0–0.9)
IRON SATN MFR SERPL: 12 %
IRON SERPL-MCNC: 44 UG/DL
LYMPHOCYTES # BLD AUTO: 1.81 K/UL — SIGNIFICANT CHANGE UP (ref 1–3.3)
LYMPHOCYTES # BLD AUTO: 29.9 % — SIGNIFICANT CHANGE UP (ref 13–44)
MCHC RBC-ENTMCNC: 27.8 PG — SIGNIFICANT CHANGE UP (ref 27–34)
MCHC RBC-ENTMCNC: 33.7 G/DL — SIGNIFICANT CHANGE UP (ref 32–36)
MCV RBC AUTO: 82.4 FL — SIGNIFICANT CHANGE UP (ref 80–100)
MONOCYTES # BLD AUTO: 0.53 K/UL — SIGNIFICANT CHANGE UP (ref 0–0.9)
MONOCYTES NFR BLD AUTO: 8.8 % — SIGNIFICANT CHANGE UP (ref 2–14)
NEUTROPHILS # BLD AUTO: 3.26 K/UL — SIGNIFICANT CHANGE UP (ref 1.8–7.4)
NEUTROPHILS NFR BLD AUTO: 53.9 % — SIGNIFICANT CHANGE UP (ref 43–77)
NRBC # BLD: 0 /100 WBCS — SIGNIFICANT CHANGE UP (ref 0–0)
NRBC BLD-RTO: 0 /100 WBCS — SIGNIFICANT CHANGE UP (ref 0–0)
PLATELET # BLD AUTO: 79 K/UL — LOW (ref 150–400)
POTASSIUM SERPL-SCNC: 4 MMOL/L
PROT SERPL-MCNC: 7.4 G/DL
RBC # BLD: 4.72 M/UL — SIGNIFICANT CHANGE UP (ref 3.8–5.2)
RBC # FLD: 13.2 % — SIGNIFICANT CHANGE UP (ref 10.3–14.5)
RETICS #: 81.7 K/UL — SIGNIFICANT CHANGE UP (ref 25–125)
RETICS/RBC NFR: 1.7 % — SIGNIFICANT CHANGE UP (ref 0.5–2.5)
SODIUM SERPL-SCNC: 139 MMOL/L
TIBC SERPL-MCNC: 360 UG/DL
UIBC SERPL-MCNC: 316 UG/DL
VIT B12 SERPL-MCNC: 313 PG/ML
WBC # BLD: 6.05 K/UL — SIGNIFICANT CHANGE UP (ref 3.8–10.5)
WBC # FLD AUTO: 6.05 K/UL — SIGNIFICANT CHANGE UP (ref 3.8–10.5)

## 2024-09-18 PROCEDURE — 99214 OFFICE O/P EST MOD 30 MIN: CPT

## 2024-09-20 NOTE — PHYSICAL EXAM
[de-identified] : Right leg 2 hematomas one on the lower leg about 4 cm and upper lateral calf about 6 cm wide.

## 2024-09-20 NOTE — ASSESSMENT
[FreeTextEntry1] : This is a 22 year old female with ITP. Initially diagnosed at [facility] in the end of April- no records. She proceeded to have a tumultuous course often relapsing at prednisone doses less than 20 mg. Patient without any thrombocytopenia since April 2021. platelets in the 200s or better then had a drop to 125 which we did not make much of an issue of before dropping to 59 at her workplace. Was 66 at the time of her visit in November. Was started on a very brief taper of dexamethasone 2mg. Platelets 192 at her own office on [place] on 2/17/23, but patient also noticed unexplained bruising without trauma to her right calf X 2 locations. Pt checked her CBC on 7/2023 and found to have low platelet (64k, 54k). repeat study on 7/16/2024 shows platelet of 39 k. Pt is not interested in taking steroid as it causes mood swings and weight gain.   Patient received 4 doses of Rituxan over the month of august. platelet counts improved from the  30's to the 70's.  Was hoping for a better relapse however sometimes Rituxan can take some additional weeks for the entire benefit to set in.  Will follow up in 3 months, as well as schedule a monthly CBC to trend the  platelet count.

## 2024-09-20 NOTE — REVIEW OF SYSTEMS
[Diarrhea: Grade 0] : Diarrhea: Grade 0 [Negative] : Allergic/Immunologic [FreeTextEntry8] : UTI on day of 1st dose of rituxan, pateint was asympamtic for this.

## 2024-09-20 NOTE — HISTORY OF PRESENT ILLNESS
[de-identified] : This is a 23 year old female with a history of eczema/asthma who is here for an evaluation of thrombocytopenia. She was admitted to [facility] for 3 days at the end of April for ITP. She noticed petechiae on her chest wall, leg, mouth bleeding, longer menstrual cycle (9 days instead of 4- not more heavy). She was found to have low plt, unknown level. She was given a platelet transfusion followed by IVIG. She was seen by hematology, unknown who. She was not started on any steroids. She was discharged, called her PCP, Dr. Pollack the following day for recurrent oral bleeding. She was seen at [facility], plt at that time was 181,000 on 4/27. She was seen by Dr. Pollack yesterday, plt were found to be 32, repeated later 26. During the hospitalization, she was told she had a RLL pneumonia and was treated with cefdinir and azithromycin. \par  \par   [de-identified] : Received Rituxan X 4 week's on the first dose patient had some abdominal pains, was treated with steroids which made things worse, had mood swinges, pateint ended cup going to the hospital with a UTi.  . plates roped slightly by the 4th dose to 72.   Doses 2-4 were uneventful.

## 2024-09-20 NOTE — HISTORY OF PRESENT ILLNESS
[de-identified] : This is a 23 year old female with a history of eczema/asthma who is here for an evaluation of thrombocytopenia. She was admitted to [facility] for 3 days at the end of April for ITP. She noticed petechiae on her chest wall, leg, mouth bleeding, longer menstrual cycle (9 days instead of 4- not more heavy). She was found to have low plt, unknown level. She was given a platelet transfusion followed by IVIG. She was seen by hematology, unknown who. She was not started on any steroids. She was discharged, called her PCP, Dr. Pollack the following day for recurrent oral bleeding. She was seen at [facility], plt at that time was 181,000 on 4/27. She was seen by Dr. Pollack yesterday, plt were found to be 32, repeated later 26. During the hospitalization, she was told she had a RLL pneumonia and was treated with cefdinir and azithromycin. \par  \par   [de-identified] : Received Rituxan X 4 week's on the first dose patient had some abdominal pains, was treated with steroids which made things worse, had mood swinges, pateint ended cup going to the hospital with a UTi.  . plates roped slightly by the 4th dose to 72.   Doses 2-4 were uneventful.

## 2024-09-20 NOTE — ASSESSMENT
[FreeTextEntry1] : This is a 22 year old female with ITP. Initially diagnosed at [facility] in the end of April- no records. She proceeded to have a tumultuous course often relapsing at prednisone doses less than 20 mg. Patient without any thrombocytopenia since April 2021. platelets in the 200s or better then had a drop to 125 which we did not make much of an issue of before dropping to 59 at her workplace. Was 66 at the time of her visit in November. Was started on a very brief taper of dexamethasone 2mg. Platelets 192 at her own office on [place] on 2/17/23, but patient also noticed unexplained bruising without trauma to her right calf X 2 locations. Pt checked her CBC on 7/2023 and found to have low platelet (64k, 54k). repeat study on 7/16/2024 shows platelet of 39 k. Pt is not interested in taking steroid as it causes mood swings and weight gain.   Patient received 4 doses of Rituxan over the month of august. platelet counts improved from the  30's to the 70's.  Was hoping for a better relapse however sometimes Rituxan can take some additional weeks for the entire benefit to set in.  Will follow up in 3 months, as well as schedule a monthly CBC to trend the  platelet count.

## 2024-09-20 NOTE — PHYSICAL EXAM
[de-identified] : Right leg 2 hematomas one on the lower leg about 4 cm and upper lateral calf about 6 cm wide.

## 2024-10-25 ENCOUNTER — APPOINTMENT (OUTPATIENT)
Dept: ENDOCRINOLOGY | Facility: CLINIC | Age: 24
End: 2024-10-25
Payer: COMMERCIAL

## 2024-10-25 VITALS
BODY MASS INDEX: 43.79 KG/M2 | TEMPERATURE: 97.5 F | OXYGEN SATURATION: 100 % | DIASTOLIC BLOOD PRESSURE: 83 MMHG | SYSTOLIC BLOOD PRESSURE: 129 MMHG | HEART RATE: 94 BPM | WEIGHT: 238 LBS | HEIGHT: 62 IN | RESPIRATION RATE: 18 BRPM

## 2024-10-25 DIAGNOSIS — E66.813 OBESITY, CLASS 3: ICD-10-CM

## 2024-10-25 PROCEDURE — 99213 OFFICE O/P EST LOW 20 MIN: CPT

## 2024-10-25 RX ORDER — SEMAGLUTIDE 0.25 MG/.5ML
0.25 INJECTION, SOLUTION SUBCUTANEOUS
Qty: 1 | Refills: 1 | Status: ACTIVE | COMMUNITY
Start: 2024-10-25 | End: 1900-01-01

## 2024-11-22 RX ORDER — DUPILUMAB 300 MG/2ML
300 INJECTION, SOLUTION SUBCUTANEOUS
Qty: 1 | Refills: 0 | Status: ACTIVE | COMMUNITY
Start: 2024-11-22 | End: 1900-01-01

## 2024-12-06 ENCOUNTER — APPOINTMENT (OUTPATIENT)
Dept: DERMATOLOGY | Facility: CLINIC | Age: 24
End: 2024-12-06
Payer: COMMERCIAL

## 2024-12-06 DIAGNOSIS — L20.9 ATOPIC DERMATITIS, UNSPECIFIED: ICD-10-CM

## 2024-12-06 PROCEDURE — 99213 OFFICE O/P EST LOW 20 MIN: CPT

## 2025-01-06 NOTE — PATIENT PROFILE ADULT - PATIENT REPRESENTATIVE: ( YOU CAN CHOOSE ANY PERSON THAT CAN ASSIST YOU WITH YOUR HEALTH CARE PREFERENCES, DOES NOT HAVE TO BE A SPOUSE, IMMEDIATE FAMILY OR SIGNIFICANT OTHER/PARTNER)
January 1, 2025 Left message on patients daughter voicemail and she is aware of their NAMRATA and poss CV, date time, location and instructions. Was advised to call the office if he has any questions or concerns.   yes

## 2025-02-05 ENCOUNTER — APPOINTMENT (OUTPATIENT)
Dept: OBGYN | Facility: CLINIC | Age: 25
End: 2025-02-05
Payer: COMMERCIAL

## 2025-02-05 PROCEDURE — 99213 OFFICE O/P EST LOW 20 MIN: CPT

## 2025-02-07 ENCOUNTER — APPOINTMENT (OUTPATIENT)
Dept: ENDOCRINOLOGY | Facility: CLINIC | Age: 25
End: 2025-02-07

## 2025-02-10 ENCOUNTER — OUTPATIENT (OUTPATIENT)
Dept: OUTPATIENT SERVICES | Facility: HOSPITAL | Age: 25
LOS: 1 days | End: 2025-02-10

## 2025-02-10 ENCOUNTER — APPOINTMENT (OUTPATIENT)
Dept: INTERNAL MEDICINE | Facility: CLINIC | Age: 25
End: 2025-02-10

## 2025-02-26 ENCOUNTER — APPOINTMENT (OUTPATIENT)
Dept: OBGYN | Facility: CLINIC | Age: 25
End: 2025-02-26
Payer: COMMERCIAL

## 2025-02-26 PROCEDURE — 81002 URINALYSIS NONAUTO W/O SCOPE: CPT

## 2025-02-26 PROCEDURE — 99395 PREV VISIT EST AGE 18-39: CPT

## 2025-02-26 PROCEDURE — 99459 PELVIC EXAMINATION: CPT

## 2025-03-17 ENCOUNTER — APPOINTMENT (OUTPATIENT)
Dept: INTERNAL MEDICINE | Facility: CLINIC | Age: 25
End: 2025-03-17
Payer: COMMERCIAL

## 2025-03-17 VITALS
HEART RATE: 86 BPM | WEIGHT: 238 LBS | SYSTOLIC BLOOD PRESSURE: 116 MMHG | BODY MASS INDEX: 43.79 KG/M2 | RESPIRATION RATE: 15 BRPM | HEIGHT: 62 IN | OXYGEN SATURATION: 100 % | TEMPERATURE: 98.5 F | DIASTOLIC BLOOD PRESSURE: 76 MMHG

## 2025-03-17 DIAGNOSIS — Z00.00 ENCOUNTER FOR GENERAL ADULT MEDICAL EXAMINATION W/OUT ABNORMAL FINDINGS: ICD-10-CM

## 2025-03-17 DIAGNOSIS — E55.9 VITAMIN D DEFICIENCY, UNSPECIFIED: ICD-10-CM

## 2025-03-17 DIAGNOSIS — Z11.3 ENCOUNTER FOR SCREENING FOR INFECTIONS WITH A PREDOMINANTLY SEXUAL MODE OF TRANSMISSION: ICD-10-CM

## 2025-03-17 DIAGNOSIS — D64.9 ANEMIA, UNSPECIFIED: ICD-10-CM

## 2025-03-17 PROCEDURE — 99395 PREV VISIT EST AGE 18-39: CPT

## 2025-03-18 LAB
25(OH)D3 SERPL-MCNC: 10.1 NG/ML
ALBUMIN SERPL ELPH-MCNC: 4.3 G/DL
ALP BLD-CCNC: 86 U/L
ALT SERPL-CCNC: 9 U/L
ANION GAP SERPL CALC-SCNC: 14 MMOL/L
AST SERPL-CCNC: 13 U/L
BILIRUB SERPL-MCNC: 0.2 MG/DL
BUN SERPL-MCNC: 13 MG/DL
CALCIUM SERPL-MCNC: 9.2 MG/DL
CHLORIDE SERPL-SCNC: 103 MMOL/L
CHOLEST SERPL-MCNC: 191 MG/DL
CO2 SERPL-SCNC: 20 MMOL/L
CREAT SERPL-MCNC: 0.65 MG/DL
EGFRCR SERPLBLD CKD-EPI 2021: 125 ML/MIN/1.73M2
ESTIMATED AVERAGE GLUCOSE: 114 MG/DL
GLUCOSE SERPL-MCNC: 79 MG/DL
HBA1C MFR BLD HPLC: 5.6 %
HCT VFR BLD CALC: 40.3 %
HDLC SERPL-MCNC: 90 MG/DL
HGB BLD-MCNC: 13.2 G/DL
LDLC SERPL-MCNC: 92 MG/DL
MCHC RBC-ENTMCNC: 28 PG
MCHC RBC-ENTMCNC: 32.8 G/DL
MCV RBC AUTO: 85.4 FL
NONHDLC SERPL-MCNC: 102 MG/DL
PLATELET # BLD AUTO: 233 K/UL
POTASSIUM SERPL-SCNC: 4 MMOL/L
PROT SERPL-MCNC: 7.3 G/DL
RBC # BLD: 4.72 M/UL
RBC # FLD: 13.6 %
SODIUM SERPL-SCNC: 137 MMOL/L
TRIGL SERPL-MCNC: 48 MG/DL
TSH SERPL-ACNC: 1.21 UIU/ML
WBC # FLD AUTO: 6.56 K/UL

## 2025-03-18 RX ORDER — ERGOCALCIFEROL 1.25 MG/1
50000 CAPSULE ORAL
Qty: 6 | Refills: 0 | Status: ACTIVE | COMMUNITY
Start: 2025-03-18 | End: 1900-01-01

## 2025-03-19 LAB
C TRACH RRNA SPEC QL NAA+PROBE: NOT DETECTED
N GONORRHOEA RRNA SPEC QL NAA+PROBE: NOT DETECTED
SOURCE AMPLIFICATION: NORMAL
T PALLIDUM AB SER QL IA: NEGATIVE

## 2025-03-20 LAB
HAV IGM SER QL: NONREACTIVE
HBV CORE IGM SER QL: NONREACTIVE
HBV SURFACE AG SER QL: NONREACTIVE
HCV AB SER QL: NONREACTIVE
HCV S/CO RATIO: 0.13 S/CO
HIV1+2 AB SPEC QL IA.RAPID: NONREACTIVE

## 2025-04-09 ENCOUNTER — RX RENEWAL (OUTPATIENT)
Age: 25
End: 2025-04-09

## 2025-07-02 ENCOUNTER — OUTPATIENT (OUTPATIENT)
Dept: OUTPATIENT SERVICES | Facility: HOSPITAL | Age: 25
LOS: 1 days | Discharge: HOME | End: 2025-07-02

## 2025-07-02 DIAGNOSIS — D69.3 IMMUNE THROMBOCYTOPENIC PURPURA: ICD-10-CM

## 2025-07-03 ENCOUNTER — RESULT REVIEW (OUTPATIENT)
Age: 25
End: 2025-07-03

## 2025-07-03 ENCOUNTER — APPOINTMENT (OUTPATIENT)
Dept: HEMATOLOGY ONCOLOGY | Facility: CLINIC | Age: 25
End: 2025-07-03
Payer: COMMERCIAL

## 2025-07-03 VITALS
TEMPERATURE: 97.2 F | DIASTOLIC BLOOD PRESSURE: 79 MMHG | WEIGHT: 242.49 LBS | SYSTOLIC BLOOD PRESSURE: 141 MMHG | HEART RATE: 77 BPM | OXYGEN SATURATION: 99 % | BODY MASS INDEX: 44.35 KG/M2 | RESPIRATION RATE: 16 BRPM

## 2025-07-03 LAB
BASOPHILS # BLD AUTO: 0.03 K/UL — SIGNIFICANT CHANGE UP (ref 0–0.2)
BASOPHILS NFR BLD AUTO: 0.5 % — SIGNIFICANT CHANGE UP (ref 0–2)
EOSINOPHIL # BLD AUTO: 0.16 K/UL — SIGNIFICANT CHANGE UP (ref 0–0.5)
EOSINOPHIL NFR BLD AUTO: 2.6 % — SIGNIFICANT CHANGE UP (ref 0–6)
HCT VFR BLD CALC: 38.5 % — SIGNIFICANT CHANGE UP (ref 34.5–45)
HGB BLD-MCNC: 13 G/DL — SIGNIFICANT CHANGE UP (ref 11.5–15.5)
IMM GRANULOCYTES NFR BLD AUTO: 0.2 % — SIGNIFICANT CHANGE UP (ref 0–0.9)
LYMPHOCYTES # BLD AUTO: 2.17 K/UL — SIGNIFICANT CHANGE UP (ref 1–3.3)
LYMPHOCYTES # BLD AUTO: 35.5 % — SIGNIFICANT CHANGE UP (ref 13–44)
MCHC RBC-ENTMCNC: 27.7 PG — SIGNIFICANT CHANGE UP (ref 27–34)
MCHC RBC-ENTMCNC: 33.8 G/DL — SIGNIFICANT CHANGE UP (ref 32–36)
MCV RBC AUTO: 82.1 FL — SIGNIFICANT CHANGE UP (ref 80–100)
MONOCYTES # BLD AUTO: 0.65 K/UL — SIGNIFICANT CHANGE UP (ref 0–0.9)
MONOCYTES NFR BLD AUTO: 10.6 % — SIGNIFICANT CHANGE UP (ref 2–14)
NEUTROPHILS # BLD AUTO: 3.09 K/UL — SIGNIFICANT CHANGE UP (ref 1.8–7.4)
NEUTROPHILS NFR BLD AUTO: 50.6 % — SIGNIFICANT CHANGE UP (ref 43–77)
NRBC BLD AUTO-RTO: 0 /100 WBCS — SIGNIFICANT CHANGE UP (ref 0–0)
PLATELET # BLD AUTO: 147 K/UL — LOW (ref 150–400)
RBC # BLD: 4.69 M/UL — SIGNIFICANT CHANGE UP (ref 3.8–5.2)
RBC # FLD: 13.4 % — SIGNIFICANT CHANGE UP (ref 10.3–14.5)
WBC # BLD: 6.11 K/UL — SIGNIFICANT CHANGE UP (ref 3.8–10.5)
WBC # FLD AUTO: 6.11 K/UL — SIGNIFICANT CHANGE UP (ref 3.8–10.5)

## 2025-07-03 PROCEDURE — 99214 OFFICE O/P EST MOD 30 MIN: CPT
